# Patient Record
Sex: FEMALE | Race: WHITE
[De-identification: names, ages, dates, MRNs, and addresses within clinical notes are randomized per-mention and may not be internally consistent; named-entity substitution may affect disease eponyms.]

---

## 2017-03-17 ENCOUNTER — HOSPITAL ENCOUNTER (EMERGENCY)
Dept: HOSPITAL 61 - ER | Age: 57
Discharge: HOME | End: 2017-03-17
Payer: SELF-PAY

## 2017-03-17 VITALS — BODY MASS INDEX: 25.46 KG/M2 | WEIGHT: 168 LBS | HEIGHT: 68 IN

## 2017-03-17 VITALS — DIASTOLIC BLOOD PRESSURE: 87 MMHG | SYSTOLIC BLOOD PRESSURE: 132 MMHG

## 2017-03-17 DIAGNOSIS — F17.200: ICD-10-CM

## 2017-03-17 DIAGNOSIS — I10: ICD-10-CM

## 2017-03-17 DIAGNOSIS — Z87.442: ICD-10-CM

## 2017-03-17 DIAGNOSIS — J44.9: ICD-10-CM

## 2017-03-17 DIAGNOSIS — J18.9: ICD-10-CM

## 2017-03-17 DIAGNOSIS — G89.29: ICD-10-CM

## 2017-03-17 DIAGNOSIS — N39.0: Primary | ICD-10-CM

## 2017-03-17 LAB
ALBUMIN SERPL-MCNC: 3.8 G/DL (ref 3.4–5)
ALBUMIN/GLOB SERPL: 0.8 {RATIO} (ref 1–1.7)
ALP SERPL-CCNC: 83 U/L (ref 46–116)
ALT SERPL-CCNC: 23 U/L (ref 14–59)
ANION GAP SERPL CALC-SCNC: 10 MMOL/L (ref 6–14)
AST SERPL-CCNC: 32 U/L (ref 15–37)
BACTERIA #/AREA URNS HPF: (no result) /HPF
BASOPHILS # BLD AUTO: 0 X10^3/UL (ref 0–0.2)
BASOPHILS NFR BLD: 0 % (ref 0–3)
BILIRUB SERPL-MCNC: 0.3 MG/DL (ref 0.2–1)
BILIRUB UR QL STRIP: NEGATIVE
BUN SERPL-MCNC: 16 MG/DL (ref 7–20)
BUN/CREAT SERPL: 13 (ref 6–20)
CALCIUM SERPL-MCNC: 9.3 MG/DL (ref 8.5–10.1)
CHLORIDE SERPL-SCNC: 92 MMOL/L (ref 98–107)
CO2 SERPL-SCNC: 31 MMOL/L (ref 21–32)
CREAT SERPL-MCNC: 1.2 MG/DL (ref 0.6–1)
EOSINOPHIL NFR BLD: 0 % (ref 0–3)
ERYTHROCYTE [DISTWIDTH] IN BLOOD BY AUTOMATED COUNT: 13.1 % (ref 11.5–14.5)
GFR SERPLBLD BASED ON 1.73 SQ M-ARVRAT: 46.1 ML/MIN
GLOBULIN SER-MCNC: 4.9 G/DL (ref 2.2–3.8)
GLUCOSE SERPL-MCNC: 111 MG/DL (ref 70–99)
GLUCOSE UR STRIP-MCNC: NEGATIVE MG/DL
HCT VFR BLD CALC: 40 % (ref 36–47)
HGB BLD-MCNC: 13.7 G/DL (ref 12–15.5)
LYMPHOCYTES # BLD: 1.5 X10^3/UL (ref 1–4.8)
LYMPHOCYTES NFR BLD AUTO: 12 % (ref 24–48)
MCH RBC QN AUTO: 32 PG (ref 25–35)
MCHC RBC AUTO-ENTMCNC: 34 G/DL (ref 31–37)
MCV RBC AUTO: 93 FL (ref 79–100)
MONOCYTES NFR BLD: 5 % (ref 0–9)
NEUTROPHILS NFR BLD AUTO: 82 % (ref 31–73)
NITRITE UR QL STRIP: POSITIVE
PH UR STRIP: 6 [PH]
PLATELET # BLD AUTO: 271 X10^3/UL (ref 140–400)
POTASSIUM SERPL-SCNC: 3.4 MMOL/L (ref 3.5–5.1)
PROT SERPL-MCNC: 8.7 G/DL (ref 6.4–8.2)
PROT UR STRIP-MCNC: 30 MG/DL
RBC # BLD AUTO: 4.32 X10^6/UL (ref 3.5–5.4)
RBC #/AREA URNS HPF: (no result) /HPF (ref 0–2)
SODIUM SERPL-SCNC: 133 MMOL/L (ref 136–145)
SP GR UR STRIP: 1.02
SQUAMOUS #/AREA URNS LPF: (no result) /LPF
UROBILINOGEN UR-MCNC: 1 MG/DL
WBC # BLD AUTO: 12.5 X10^3/UL (ref 4–11)

## 2017-03-17 PROCEDURE — 74176 CT ABD & PELVIS W/O CONTRAST: CPT

## 2017-03-17 PROCEDURE — 87186 SC STD MICRODIL/AGAR DIL: CPT

## 2017-03-17 PROCEDURE — 96365 THER/PROPH/DIAG IV INF INIT: CPT

## 2017-03-17 PROCEDURE — 96366 THER/PROPH/DIAG IV INF ADDON: CPT

## 2017-03-17 PROCEDURE — 96375 TX/PRO/DX INJ NEW DRUG ADDON: CPT

## 2017-03-17 PROCEDURE — 85027 COMPLETE CBC AUTOMATED: CPT

## 2017-03-17 PROCEDURE — 71010: CPT

## 2017-03-17 PROCEDURE — 99285 EMERGENCY DEPT VISIT HI MDM: CPT

## 2017-03-17 PROCEDURE — 81001 URINALYSIS AUTO W/SCOPE: CPT

## 2017-03-17 PROCEDURE — 87086 URINE CULTURE/COLONY COUNT: CPT

## 2017-03-17 PROCEDURE — 80053 COMPREHEN METABOLIC PANEL: CPT

## 2017-03-17 PROCEDURE — 94640 AIRWAY INHALATION TREATMENT: CPT

## 2017-03-17 PROCEDURE — 36415 COLL VENOUS BLD VENIPUNCTURE: CPT

## 2017-03-17 RX ADMIN — SODIUM CHLORIDE, PRESERVATIVE FREE PRN ML: 5 INJECTION INTRAVENOUS at 23:30

## 2017-03-17 RX ADMIN — SODIUM CHLORIDE, PRESERVATIVE FREE PRN ML: 5 INJECTION INTRAVENOUS at 22:53

## 2017-03-17 NOTE — PHYS DOC
Past Medical History


Past Medical History:  Cancer, COPD, Hypertension, Kidney Stone


Additional Past Medical Histor:  chronic pain


Past Surgical History:  Other


Additional Past Surgical Histo:  Ureteral Stent, Laparoscopy


Alcohol Use:  None


Drug Use:  None





Adult General


Chief Complaint


Chief Complaint:  FLANK PAIN





[facility]


HPI


Patient is a 56  year old female presents emergency Department with 2 complaints

: 1. Left-sided flank pain that began 4 days ago with dysuria that began 4 days 

ago as well. Patient has a history of kidney stones. She denies injury to her 

back. She denies any fevers or chills. She denies any nausea or vomiting. She 

denies hematuria or changes in frequency of urination.


2. Shortness of breath and nonproductive cough is been ongoing for 

approximately a week and a half to 2 weeks. Patient has a history of chronic 

bronchitis. Patient continues to smoke. She states that she recently was able 

to get a another nebulizer at home to help with breathing treatments. She 

states that they have had some positive effects, but continues to have the 

problems with a cough.


Overall, patient denies antibiotic use, hospitalization or foreign travel 

within the past 90 days.


Patient reports that her primary care doctor's Dr. Cardona. Patient reports her 

urologist is Dr. Pearson. Patient states that she currently does not have a 

pulmonologist.





Review of Systems


Review of Systems





Constitutional: Denies fever or chills []


Eyes: Denies change in visual acuity, redness, or eye pain []


HENT: Denies nasal congestion or sore throat []


Respiratory: Denies cough or shortness of breath []


Cardiovascular: No additional information not addressed in HPI []


GI: Denies abdominal pain, nausea, vomiting, bloody stools or diarrhea []


: Denies dysuria or hematuria []


Musculoskeletal: Denies back pain or joint pain []


Integument: Denies rash or skin lesions []


Neurologic: Denies headache, focal weakness or sensory changes []


Endocrine: Denies polyuria or polydipsia []





Current Medications


Current Medications





 Current Medications








 Medications


  (Trade)  Dose


 Ordered  Sig/Kimberli  Start Time


 Stop Time Status Last Admin


Dose Admin


 


 Albuterol/


 Ipratropium


  (Duoneb)  3 ml  1X  ONCE  3/17/17 21:00


 3/17/17 21:01 DC 3/17/17 21:04


3 ML


 


 Azithromycin


  (Zithromax)  500 mg  1X  ONCE  3/17/17 22:00


 3/17/17 22:01 DC 3/17/17 22:13


500 MG


 


 Ceftriaxone Sodium


  (Rocephin 1gm


 Ivpb For Omni)  50 ml @ 


 100 mls/hr  1X  ONCE  3/17/17 21:00


 3/17/17 21:29 DC 3/17/17 20:59


100 MLS/HR


 


 Hydromorphone HCl


  (Dilaudid)  1 mg  1X  ONCE  3/17/17 22:00


 3/17/17 22:01 DC 3/17/17 22:13


1 MG


 


 Morphine Sulfate  5 mg  1X  ONCE  3/17/17 21:00


 3/17/17 21:01 DC 3/17/17 21:00


5 MG


 


 Ondansetron HCl 4


 mg  4 mg  1X  ONCE  3/17/17 21:00


 3/17/17 21:01 DC 3/17/17 21:00


4 MG


 


 Sodium Chloride


  (Iv Sodium


 Chloride 0.9%


 1000ml Bag)  1,000 ml @ 


 1,000 mls/hr  Q1H  3/17/17 21:00


 3/17/17 21:59 DC 3/17/17 20:59


1,000 MLS/HR


 


 Sodium Chloride


  (Normal Saline


 Flush)  10 ml  QSHIFT  PRN  3/17/17 20:45


    3/17/17 22:53


10 ML











Allergies


Allergies





 Allergies








Coded Allergies Type Severity Reaction Last Updated Verified


 


  No Known Drug Allergies    10/8/16 No











Physical Exam


Physical Exam





Constitutional: Well developed, well nourished, mild distress, non-toxic 

appearance. []


HENT: Normocephalic, atraumatic, bilateral external ears normal, oropharynx 

moist, no oral exudates, nose normal. 


Eyes: PERRLA, EOMI, conjunctiva normal, no discharge. [] 


Neck: Normal range of motion, no tenderness, supple, no stridor. 


Cardiovascular:Heart rate 88 with regular rhythm, no murmur 


Lungs & Thorax: There is no respiratory distress respiratory fatigue. There is 

no sensory muscle use or posturing. Patient does demonstrate a harsh, bronchitic

, nonproductive cough. Patient is able speak in full sentences. Patient has 

bilateral coarse wheezing in all lung fields. There are no rales or rhonchi.


Abdomen: Bowel sounds normal, soft, no tenderness, no masses, no pulsatile 

masses. 


Skin: Warm, dry, no erythema, no rash. [] 


Back: Patient's back is normal in appearance. There is no overlying skin 

lesions. Patient has left CVA tenderness. There is no palpable defect to the 

musculature of patient's back. There are no active spasms.


Extremities: No tenderness, no cyanosis, no clubbing, ROM intact, no edema. [] 


Neurologic: Alert and oriented X 3, normal motor function, normal sensory 

function, no focal deficits noted. 


Psychologic: Dysphoric mood with pleasant affect. Patient is very focused on 

pain management/pain control.





Current Patient Data


Vital Signs





 Vital Signs








  Date Time  Temp Pulse Resp B/P Pulse Ox O2 Delivery O2 Flow Rate FiO2


 


3/17/17 22:13      Room Air  


 


3/17/17 21:05     88   


 


3/17/17 17:44 98.1 115 18 132/87    





 98.1       








Lab Values





 Laboratory Tests








Test


  3/17/17


18:00 3/17/17


20:35


 


Urine Collection Type Unknown   


 


Urine Color Yellow   


 


Urine Clarity Clear   


 


Urine pH 6.0   


 


Urine Specific Gravity 1.020   


 


Urine Protein


  30mg/dL


(NEG-TRACE) 


 


 


Urine Glucose (UA)


  Negativemg/dL


(NEG) 


 


 


Urine Ketones (Stick)


  Negativemg/dL


(NEG) 


 


 


Urine Blood Small (NEG)   


 


Urine Nitrite


  Positive (NEG)


  


 


 


Urine Bilirubin


  Negative (NEG)


  


 


 


Urine Urobilinogen Dipstick


  1.0mg/dL (0.2


mg/dL) 


 


 


Urine Leukocyte Esterase


  Moderate (NEG)


  


 


 


Urine RBC Occ/HPF (0-2)   


 


Urine WBC


  11-20/HPF


(0-4) 


 


 


Urine Squamous Epithelial


Cells Few/LPF  


  


 


 


Urine Bacteria


  Many/HPF


(0-FEW) 


 


 


Urine Hyaline Casts Moderate/HPF   


 


Urine Mucus Mod/LPF   


 


White Blood Count


  


  12.5x10^3/uL


(4.0-11.0)  H


 


Red Blood Count


  


  4.32x10^6/uL


(3.50-5.40)


 


Hemoglobin


  


  13.7g/dL


(12.0-15.5)


 


Hematocrit


  


  40.0%


(36.0-47.0)


 


Mean Corpuscular Volume  93fL ()  


 


Mean Corpuscular Hemoglobin  32pg (25-35)  


 


Mean Corpuscular Hemoglobin


Concent 


  34g/dL (31-37)


 


 


Red Cell Distribution Width


  


  13.1%


(11.5-14.5)


 


Platelet Count


  


  271x10^3/uL


(140-400)


 


Neutrophils (%) (Auto)  82% (31-73)  H


 


Lymphocytes (%) (Auto)  12% (24-48)  L


 


Monocytes (%) (Auto)  5% (0-9)  


 


Eosinophils (%) (Auto)  0% (0-3)  


 


Basophils (%) (Auto)  0% (0-3)  


 


Neutrophils # (Auto)


  


  10.2x10^3uL


(1.8-7.7)  H


 


Lymphocytes # (Auto)


  


  1.5x10^3/uL


(1.0-4.8)


 


Monocytes # (Auto)


  


  0.7x10^3/uL


(0.0-1.1)


 


Eosinophils # (Auto)


  


  0.0x10^3/uL


(0.0-0.7)


 


Basophils # (Auto)


  


  0.0x10^3/uL


(0.0-0.2)


 


Sodium Level


  


  133mmol/L


(136-145)  L


 


Potassium Level


  


  3.4mmol/L


(3.5-5.1)  L


 


Chloride Level


  


  92mmol/L


()  L


 


Carbon Dioxide Level


  


  31mmol/L


(21-32)


 


Anion Gap  10 (6-14)  


 


Blood Urea Nitrogen


  


  16mg/dL (7-20)


 


 


Creatinine


  


  1.2mg/dL


(0.6-1.0)  H


 


Estimated GFR


(Cockcroft-Gault) 


  46.1  


 


 


BUN/Creatinine Ratio  13 (6-20)  


 


Glucose Level


  


  111mg/dL


(70-99)  H


 


Calcium Level


  


  9.3mg/dL


(8.5-10.1)


 


Total Bilirubin


  


  0.3mg/dL


(0.2-1.0)


 


Aspartate Amino Transferase


(AST) 


  32U/L (15-37)  


 


 


Alanine Aminotransferase (ALT)  23U/L (14-59)  


 


Alkaline Phosphatase


  


  83U/L ()


 


 


Total Protein


  


  8.7g/dL


(6.4-8.2)  H


 


Albumin


  


  3.8g/dL


(3.4-5.0)


 


Albumin/Globulin Ratio


  


  0.8 (1.0-1.7)


L





 Laboratory Tests


3/17/17 20:35








 Laboratory Tests


3/17/17 20:35














EKG


EKG


[]





Radiology/Procedures


Radiology/Procedures


[General acute hospital


 [address] [place] [address] [phone]


 


 IMAGING REPORT





 Signed





PATIENT: PAMELA DU ACCOUNT: [id number] MRN#: [id number]


: 1960 LOCATION: ER AGE: 56


SEX: F EXAM DT: 17 ACCESSION#: [id number]


STATUS: REG ER ORD. PHYSICIAN: AZRA LUBIN 


REASON: LEFT flank pain-hx of kidney stones


PROCEDURE: ABDOMEN PELVIS WO CONTRAST











PROCEDURE 


CT abdomen and pelvis without intravenous contrast. 


 


HISTORY 


Left flank pain, renal stones. 


 


TECHNIQUE 


Helical CT of the abdomen and pelvis was performed without intravenous or 


oral contrast. 


Exposure: One or more of the following individualized dose reduction 


techniques were utilized for this examination: 


1. Automated exposure control. 


2. Adjustment of the mA and/or kV according to patient size. 


3. Use of iterative reconstruction technique. 


 


COMPARISON 


CT abdomen pelvis 2016. 


 


FINDINGS 


Evaluation of solid organs is limited by lack of intravenous contrast. 


Evaluation of enteric structures may be limited by lack of oral contrast. 


Images of lower chest demonstrate nodular ground-glass opacity and 


consolidation involving both lower lobes, suggesting pneumonia versus 


nonspecific inflammatory process. 


The liver is unremarkable. Calcified splenic granulomata are present. 


Pancreas, gallbladder, and bilateral adrenal glands are unremarkable. 


Aortic atherosclerosis is seen. No bowel obstruction or inflammation is 


identified. Appendix is without inflammation. Urinary bladder is 


unremarkable. Uterus is absent. 


Left kidney and ureter are free of stone or obstruction. Superior pole of 


the right kidney demonstrates 3 millimeter nonobstructive nephrolith. 


There is mild right hydroureteronephrosis until the right ureter crosses 


over the iliac vessels; cause is not identified. No right ureteral stone 


is seen. 


Dextroconvex scoliosis of the lumbar spine is seen. Degenerative changes 


are noted in the spine. The L1 vertebral body demonstrates compression 


treated with cement. 


 


IMPRESSION 


1. Nonobstructive right nephrolith.


2. There is mild right hydroureteronephrosis to the level of the right 


ureter as it crosses the iliac vessels. Cause is not identified.


3. Left kidney and ureter are free of stone or obstruction.


4. Nodular ground-glass opacity and consolidation involving both lower 


lobes, compatible with nonspecific infectious or inflammatory process.


 


 


 


Electronically signed by: Dimas Laguna MD (Mar 17, 2017 22:29:40)














DICTATED and SIGNED BY:     DIMAS LAGUNA MD


DATE:     17





CC: AZRA LUBIN; ANDRES CARDONA MD ~








Portable AP chest x-ray patient's chest was performed with adequate technique. 

There appears to be a small infiltrate in patient's right lower lobe.]





Course & Med Decision Making


Course & Med Decision Making


Patient reevaluated by me at 2250: Patient reports that she feels "much better"

. Reevaluation of the patient shows that her oxygen saturation is 91-93% on 

room air. The wheezing has been greatly diminished and her lung fields. She 

reports feeling comfortable. I reviewed her chest x-ray, laboratory tests and 

CT scan findings with her. I will place her on Levaquin. I've advised her to 

use her nebulizer every 6 hours. I advised her to call her primary care doctor, 

Dr. Cardona, Monday morning to schedule follow-up appointment. She will be 

provided discharge instructions reviewing home care and reasons to return to 

the emergency department.





Dragon Disclaimer


Dragon Disclaimer


This electronic medical record was generated, in whole or in part, using a 

voice recognition dictation system.





Departure


Departure


Impression:  


 Primary Impression:  


 Flank pain


 Additional Impressions:  


 UTI (urinary tract infection)


 COPD (chronic obstructive pulmonary disease)


 Community acquired pneumonia


Disposition:   HOME, SELF-CARE


Condition:  IMPROVED


Referrals:  


ANDRES CARDONA MD (PCP)


Patient Instructions:  Chronic Obstructive Pulmonary Disease Exacerbation, Easy-

to-Read, Flank Pain, Easy-to-Read, Pneumonia, Adult, Easy-to-Read, Smoking, You 

Can Quit, Easy-to-Read, Urinary Tract Infection, Easy-to-Read





Additional Instructions:


1. Take the medications as prescribed.


2. Review the discharge instructions provided and reasons to return to the 

emergency department.


3. Use your nebulizer at home every 6 hours.


4. Drink 10-12, 10 ounce glasses of non-caffeinated beverage daily.


5. Call Dr. Cardona's office Monday morning to schedule follow-up appointment.


Scripts


Oxycodone/Apap 5-325 (Percocet 5-325 Mg Tablet)1 Each Tablet1-2 Tab PO Q6HRS #

20 TAB


   Prov:AZRA LUBIN         3/17/17


Prednisone 50 Mg Tablet1 Tab PO DAILY #5 TAB


   Prov:AZRA LUBIN         3/17/17


Levofloxacin (Levaquin)750 Mg Tablet1 Tab PO DAILY #7 TAB


   Prov:AZRA LUBIN         3/17/17





Problem Qualifiers








AZRA LUBIN Mar 17, 2017 20:45

## 2017-03-17 NOTE — RAD
PROCEDURE 

CT abdomen and pelvis without intravenous contrast. 

 

HISTORY 

Left flank pain, renal stones. 

 

TECHNIQUE 

Helical CT of the abdomen and pelvis was performed without intravenous or 

oral contrast. 

Exposure: One or more of the following individualized dose reduction 

techniques were utilized for this examination: 

1. Automated exposure control. 

2. Adjustment of the mA and/or kV according to patient size. 

3. Use of iterative reconstruction technique. 

 

COMPARISON 

CT abdomen pelvis October 8, 2016. 

 

FINDINGS 

Evaluation of solid organs is limited by lack of intravenous contrast. 

Evaluation of enteric structures may be limited by lack of oral contrast. 

Images of lower chest demonstrate nodular ground-glass opacity and 

consolidation involving both lower lobes, suggesting pneumonia versus 

nonspecific inflammatory process. 

The liver is unremarkable. Calcified splenic granulomata are present. 

Pancreas, gallbladder, and bilateral adrenal glands are unremarkable. 

Aortic atherosclerosis is seen. No bowel obstruction or inflammation is 

identified. Appendix is without inflammation. Urinary bladder is 

unremarkable. Uterus is absent. 

Left kidney and ureter are free of stone or obstruction. Superior pole of 

the right kidney demonstrates 3 millimeter nonobstructive nephrolith. 

There is mild right hydroureteronephrosis until the right ureter crosses 

over the iliac vessels; cause is not identified. No right ureteral stone 

is seen. 

Dextroconvex scoliosis of the lumbar spine is seen. Degenerative changes 

are noted in the spine. The L1 vertebral body demonstrates compression 

treated with cement. 

 

IMPRESSION 

1. Nonobstructive right nephrolith.

2. There is mild right hydroureteronephrosis to the level of the right 

ureter as it crosses the iliac vessels. Cause is not identified.

3. Left kidney and ureter are free of stone or obstruction.

4. Nodular ground-glass opacity and consolidation involving both lower 

lobes, compatible with nonspecific infectious or inflammatory process.

 

 

 

Electronically signed by: Dimas Campos MD (Mar 17, 2017 22:29:40)

## 2017-03-18 NOTE — RAD
Indication: Dyspnea, short of breath and chest pain.



Technique: Upright portable chest radiograph was obtained and compared to a

study from June 7, 2015.



Findings: Right lower and left midlung infiltrates are noted. The heart is not

enlarged and the pulmonary vasculature does not appear cephalized. Bony

structures are intact. Leads overlie the patient.



Impression: Right lower and left midlung field infiltrates.

## 2017-09-16 ENCOUNTER — HOSPITAL ENCOUNTER (INPATIENT)
Dept: HOSPITAL 61 - ER | Age: 57
LOS: 2 days | Discharge: HOME | DRG: 552 | End: 2017-09-18
Attending: FAMILY MEDICINE | Admitting: FAMILY MEDICINE
Payer: SELF-PAY

## 2017-09-16 VITALS — DIASTOLIC BLOOD PRESSURE: 87 MMHG | SYSTOLIC BLOOD PRESSURE: 145 MMHG

## 2017-09-16 VITALS — HEIGHT: 68 IN | WEIGHT: 160 LBS | BODY MASS INDEX: 24.25 KG/M2

## 2017-09-16 DIAGNOSIS — I10: ICD-10-CM

## 2017-09-16 DIAGNOSIS — F17.210: ICD-10-CM

## 2017-09-16 DIAGNOSIS — Z82.49: ICD-10-CM

## 2017-09-16 DIAGNOSIS — M54.89: Primary | ICD-10-CM

## 2017-09-16 DIAGNOSIS — G43.909: ICD-10-CM

## 2017-09-16 DIAGNOSIS — M79.1: ICD-10-CM

## 2017-09-16 DIAGNOSIS — J44.9: ICD-10-CM

## 2017-09-16 DIAGNOSIS — E03.9: ICD-10-CM

## 2017-09-16 DIAGNOSIS — E78.5: ICD-10-CM

## 2017-09-16 DIAGNOSIS — G89.29: ICD-10-CM

## 2017-09-16 DIAGNOSIS — E87.6: ICD-10-CM

## 2017-09-16 DIAGNOSIS — C53.9: ICD-10-CM

## 2017-09-16 DIAGNOSIS — Z87.442: ICD-10-CM

## 2017-09-16 DIAGNOSIS — G62.9: ICD-10-CM

## 2017-09-16 DIAGNOSIS — Z92.3: ICD-10-CM

## 2017-09-16 DIAGNOSIS — Z92.21: ICD-10-CM

## 2017-09-16 LAB
ALBUMIN SERPL-MCNC: 4.3 G/DL (ref 3.4–5)
ALBUMIN/GLOB SERPL: 1 {RATIO} (ref 1–1.7)
ALP SERPL-CCNC: 97 U/L (ref 46–116)
ALT SERPL-CCNC: 24 U/L (ref 14–59)
ANION GAP SERPL CALC-SCNC: 11 MMOL/L (ref 6–14)
AST SERPL-CCNC: 22 U/L (ref 15–37)
BACTERIA #/AREA URNS HPF: 0 /HPF
BASOPHILS # BLD AUTO: 0.1 X10^3/UL (ref 0–0.2)
BASOPHILS NFR BLD: 1 % (ref 0–3)
BILIRUB SERPL-MCNC: 0.3 MG/DL (ref 0.2–1)
BILIRUB UR QL STRIP: NEGATIVE
BUN SERPL-MCNC: 5 MG/DL (ref 7–20)
BUN/CREAT SERPL: 5 (ref 6–20)
CALCIUM SERPL-MCNC: 9.9 MG/DL (ref 8.5–10.1)
CHLORIDE SERPL-SCNC: 101 MMOL/L (ref 98–107)
CO2 SERPL-SCNC: 30 MMOL/L (ref 21–32)
CREAT SERPL-MCNC: 1 MG/DL (ref 0.6–1)
EOSINOPHIL NFR BLD: 0 % (ref 0–3)
ERYTHROCYTE [DISTWIDTH] IN BLOOD BY AUTOMATED COUNT: 14.2 % (ref 11.5–14.5)
GFR SERPLBLD BASED ON 1.73 SQ M-ARVRAT: 56.7 ML/MIN
GLOBULIN SER-MCNC: 4.5 G/DL (ref 2.2–3.8)
GLUCOSE SERPL-MCNC: 103 MG/DL (ref 70–99)
GLUCOSE UR STRIP-MCNC: NEGATIVE MG/DL
HCT VFR BLD CALC: 40.3 % (ref 36–47)
HGB BLD-MCNC: 13.9 G/DL (ref 12–15.5)
LYMPHOCYTES # BLD: 2.5 X10^3/UL (ref 1–4.8)
LYMPHOCYTES NFR BLD AUTO: 17 % (ref 24–48)
MAGNESIUM SERPL-MCNC: 2 MG/DL (ref 1.8–2.4)
MCH RBC QN AUTO: 33 PG (ref 25–35)
MCHC RBC AUTO-ENTMCNC: 35 G/DL (ref 31–37)
MCV RBC AUTO: 97 FL (ref 79–100)
MONOCYTES NFR BLD: 7 % (ref 0–9)
NEUTROPHILS NFR BLD AUTO: 76 % (ref 31–73)
NITRITE UR QL STRIP: NEGATIVE
PH UR STRIP: 6.5 [PH]
PLATELET # BLD AUTO: 411 X10^3/UL (ref 140–400)
POTASSIUM SERPL-SCNC: 2.7 MMOL/L (ref 3.5–5.1)
PROT SERPL-MCNC: 8.8 G/DL (ref 6.4–8.2)
PROT UR STRIP-MCNC: NEGATIVE MG/DL
RBC # BLD AUTO: 4.18 X10^6/UL (ref 3.5–5.4)
RBC #/AREA URNS HPF: 0 /HPF (ref 0–2)
SODIUM SERPL-SCNC: 142 MMOL/L (ref 136–145)
SP GR UR STRIP: <=1.005
SQUAMOUS #/AREA URNS LPF: (no result) /LPF
UROBILINOGEN UR-MCNC: 0.2 MG/DL
WBC # BLD AUTO: 15.1 X10^3/UL (ref 4–11)
WBC #/AREA URNS HPF: (no result) /HPF (ref 0–4)

## 2017-09-16 PROCEDURE — 83735 ASSAY OF MAGNESIUM: CPT

## 2017-09-16 PROCEDURE — 84443 ASSAY THYROID STIM HORMONE: CPT

## 2017-09-16 PROCEDURE — 36415 COLL VENOUS BLD VENIPUNCTURE: CPT

## 2017-09-16 PROCEDURE — 83690 ASSAY OF LIPASE: CPT

## 2017-09-16 PROCEDURE — 90732 PPSV23 VACC 2 YRS+ SUBQ/IM: CPT

## 2017-09-16 PROCEDURE — 80053 COMPREHEN METABOLIC PANEL: CPT

## 2017-09-16 PROCEDURE — 80048 BASIC METABOLIC PNL TOTAL CA: CPT

## 2017-09-16 PROCEDURE — 81001 URINALYSIS AUTO W/SCOPE: CPT

## 2017-09-16 PROCEDURE — 85025 COMPLETE CBC W/AUTO DIFF WBC: CPT

## 2017-09-16 PROCEDURE — 84439 ASSAY OF FREE THYROXINE: CPT

## 2017-09-16 PROCEDURE — 87086 URINE CULTURE/COLONY COUNT: CPT

## 2017-09-16 PROCEDURE — 90686 IIV4 VACC NO PRSV 0.5 ML IM: CPT

## 2017-09-16 PROCEDURE — 96361 HYDRATE IV INFUSION ADD-ON: CPT

## 2017-09-16 PROCEDURE — 96374 THER/PROPH/DIAG INJ IV PUSH: CPT

## 2017-09-16 RX ADMIN — HYDROMORPHONE HYDROCHLORIDE PRN MG: 2 INJECTION INTRAMUSCULAR; INTRAVENOUS; SUBCUTANEOUS at 22:22

## 2017-09-16 RX ADMIN — HYDROMORPHONE HYDROCHLORIDE PRN MG: 2 INJECTION INTRAMUSCULAR; INTRAVENOUS; SUBCUTANEOUS at 21:36

## 2017-09-16 RX ADMIN — HYDROMORPHONE HYDROCHLORIDE PRN MG: 2 INJECTION INTRAMUSCULAR; INTRAVENOUS; SUBCUTANEOUS at 23:16

## 2017-09-16 NOTE — PHYS DOC
Past Medical History


Past Medical History:  Cancer, COPD, Hypertension, Kidney Stone


Additional Past Medical Histor:  chronic pain


Past Surgical History:  Other


Additional Past Surgical Histo:  Ureteral Stent, Laparoscopy


Alcohol Use:  None


Drug Use:  None





Adult General


Chief Complaint


Chief Complaint:  FLANK PAIN





HPI


HPI





Patient is a 57  year old female who presents with complaint of left-sided back 

and flank pain. Patient states that her symptoms started this morning at 

approximately 9:00. Patient states that she has been having near constant pain 

which has been fluctuating throughout the day. Patient states that she has felt 

spasms along the left side of her back. The patient states that she is 

concerned that they may be kidney stones. Patient has had multiple visits to 

the emergency department for similar complaints. The patient has history of 

chronic back pain and currently takes oxycodone therapy at home. Patient states 

that she took oxycodone today with no relief in symptoms. The patient has not 

had any associated fever, nausea, or vomiting. Patient rates pain currently as 

7 out of 10. Patient states that she has had associated loose stool with her 

symptoms today. The patient states that she spoke with Dr. Addison who was on-

call for her primary physician, Dr. Michele, and she was instructed to come to the 

emergency department for further evaluation.





Review of Systems


Review of Systems





Constitutional: Denies fever or chills []


Eyes: Denies change in visual acuity, redness, or eye pain []


HENT: Denies nasal congestion or sore throat []


Respiratory: Denies cough or shortness of breath []


Cardiovascular: Denies chest pain or edema[]


GI: Denies abdominal pain, nausea, vomiting, bloody stools or diarrhea []


: Denies dysuria or hematuria []


Musculoskeletal: Back pain[]


Integument: Denies rash or skin lesions []


Neurologic: Denies headache, focal weakness or sensory changes []





Current Medications


Current Medications





Current Medications








 Medications


  (Trade)  Dose


 Ordered  Sig/Kimberli  Start Time


 Stop Time Status Last Admin


Dose Admin


 


 Hydromorphone HCl


  (Dilaudid)  1 mg  PRN Q15MIN  PRN  9/16/17 21:30


 9/17/17 21:29  9/16/17 22:22


1 MG


 


 Ondansetron HCl


  (Zofran)  4 mg  1X  ONCE  9/16/17 21:30


 9/16/17 21:31 DC 9/16/17 21:35


4 MG


 


 Potassium Chloride


  (Klor-Con)  40 meq  1X  ONCE  9/16/17 22:30


 9/16/17 22:31 DC  


 


 


 Sodium Chloride  1,000 ml @ 


 1,000 mls/hr  Q1H  9/16/17 21:30


 9/16/17 22:29 DC 9/16/17 21:35


1,000 MLS/HR











Allergies


Allergies





Allergies








Coded Allergies Type Severity Reaction Last Updated Verified


 


  No Known Drug Allergies    10/8/16 No











Physical Exam


Physical Exam





Constitutional: Alert, afebrile, appears in moderate discomfort. []


HENT: Normocephalic, atraumatic, bilateral external ears normal, oropharynx 

moist, no oral exudates, nose normal. []


Eyes: PERRLA, EOMI, conjunctiva normal, no discharge. [] 


Neck: Normal range of motion, no tenderness, supple, no stridor. [] 


Cardiovascular:Heart rate regular rhythm, no murmur []


Lungs & Thorax:  Bilateral breath sounds clear to auscultation []


Abdomen: Bowel sounds normal, soft, no tenderness, no masses, no pulsatile 

masses. [] 


Skin: Warm, dry, no erythema, no rash. [] 


Back: No midline tenderness, left mid and lower lumbar paraspinous muscle 

tenderness to palpation with palpable spasm, no CVA tenderness. [] 


Extremities: No tenderness, no cyanosis, no clubbing, ROM intact, no edema. [] 


Neurologic: Alert and oriented X 3, normal motor function, normal sensory 

function, no focal deficits noted. []





Current Patient Data


Vital Signs





 Vital Signs








  Date Time  Temp Pulse Resp B/P (MAP) Pulse Ox O2 Delivery O2 Flow Rate FiO2


 


9/16/17 22:22   18  98 Room Air  


 


9/16/17 20:54 97.9 105  162/98 (119)    





 97.9       








Lab Values





 Laboratory Tests








Test


  9/16/17


20:47 9/16/17


20:58


 


Urine Collection Type Unknown   


 


Urine Color Yellow   


 


Urine Clarity Clear   


 


Urine pH 6.5   


 


Urine Specific Gravity <=1.005   


 


Urine Protein


  Negative mg/dL


(NEG-TRACE) 


 


 


Urine Glucose (UA)


  Negative mg/dL


(NEG) 


 


 


Urine Ketones (Stick)


  Negative mg/dL


(NEG) 


 


 


Urine Blood


  Negative (NEG)


  


 


 


Urine Nitrite


  Negative (NEG)


  


 


 


Urine Bilirubin


  Negative (NEG)


  


 


 


Urine Urobilinogen Dipstick


  0.2 mg/dL (0.2


mg/dL) 


 


 


Urine Leukocyte Esterase Small (NEG)   


 


Urine RBC 0 /HPF (0-2)   


 


Urine WBC


  5-10 /HPF


(0-4) 


 


 


Urine Squamous Epithelial


Cells Mod /LPF  


  


 


 


Urine Bacteria


  0 /HPF (0-FEW)


  


 


 


White Blood Count


  


  15.1 x10^3/uL


(4.0-11.0)  H


 


Red Blood Count


  


  4.18 x10^6/uL


(3.50-5.40)


 


Hemoglobin


  


  13.9 g/dL


(12.0-15.5)


 


Hematocrit


  


  40.3 %


(36.0-47.0)


 


Mean Corpuscular Volume


  


  97 fL ()


 


 


Mean Corpuscular Hemoglobin  33 pg (25-35)  


 


Mean Corpuscular Hemoglobin


Concent 


  35 g/dL


(31-37)


 


Red Cell Distribution Width


  


  14.2 %


(11.5-14.5)


 


Platelet Count


  


  411 x10^3/uL


(140-400)  H


 


Neutrophils (%) (Auto)  76 % (31-73)  H


 


Lymphocytes (%) (Auto)  17 % (24-48)  L


 


Monocytes (%) (Auto)  7 % (0-9)  


 


Eosinophils (%) (Auto)  0 % (0-3)  


 


Basophils (%) (Auto)  1 % (0-3)  


 


Neutrophils # (Auto)


  


  11.4 x10^3uL


(1.8-7.7)  H


 


Lymphocytes # (Auto)


  


  2.5 x10^3/uL


(1.0-4.8)


 


Monocytes # (Auto)


  


  1.1 x10^3/uL


(0.0-1.1)


 


Eosinophils # (Auto)


  


  0.1 x10^3/uL


(0.0-0.7)


 


Basophils # (Auto)


  


  0.1 x10^3/uL


(0.0-0.2)


 


Sodium Level


  


  142 mmol/L


(136-145)


 


Potassium Level


  


  2.7 mmol/L


(3.5-5.1)  *L


 


Chloride Level


  


  101 mmol/L


()


 


Carbon Dioxide Level


  


  30 mmol/L


(21-32)


 


Anion Gap  11 (6-14)  


 


Blood Urea Nitrogen


  


  5 mg/dL (7-20)


L


 


Creatinine


  


  1.0 mg/dL


(0.6-1.0)


 


Estimated GFR


(Cockcroft-Gault) 


  56.7  


 


 


BUN/Creatinine Ratio  5 (6-20)  L


 


Glucose Level


  


  103 mg/dL


(70-99)  H


 


Calcium Level


  


  9.9 mg/dL


(8.5-10.1)


 


Magnesium Level


  


  2.0 mg/dL


(1.8-2.4)


 


Total Bilirubin


  


  0.3 mg/dL


(0.2-1.0)


 


Aspartate Amino Transferase


(AST) 


  22 U/L (15-37)


 


 


Alanine Aminotransferase (ALT)


  


  24 U/L (14-59)


 


 


Alkaline Phosphatase


  


  97 U/L


()


 


Total Protein


  


  8.8 g/dL


(6.4-8.2)  H


 


Albumin


  


  4.3 g/dL


(3.4-5.0)


 


Albumin/Globulin Ratio  1.0 (1.0-1.7)  


 


Lipase


  


  101 U/L


()





 Laboratory Tests


9/16/17 20:58








 Laboratory Tests


9/16/17 20:58











EKG


EKG


Not performed[]





Radiology/Procedures


Radiology/Procedures


Not performed[]





Course & Med Decision Making


Course & Med Decision Making


Pertinent Labs and Imaging studies reviewed. (See chart for details)





The patient's lab work was remarkable for a decreased potassium level at 2.7. 

The patient was started on oral supplementation to replace this as this may be 

contributing to the patient's back spasms. The patient's urine shows no 

evidence of hematuria. The patient's previous CT scan was reviewed from March 17 , 2017. It reported evidence of nephrolithiasis without obstructing calculi in 

the right kidney and no evidence of nephrolithiasis or ureterolithiasis on the 

left side. I have very low suspicion that the patient's symptoms are due to 

ureteral lithiasis. Due to multiple previous abdominal CTs, the patient will 

not be CT did to reduce radiation exposure. The patient was treated with IV 

Dilaudid in the emergency department. Despite treatment, the patient states 

that she is still in severe pain and is unable to function at home in her 

current state. I spoke with Dr. Addison who was on-call for Dr. Michele and she 

accepted care of patient in hospital.





Dragon Disclaimer


Dragon Disclaimer


This electronic medical record was generated, in whole or in part, using a 

voice recognition dictation system.





Departure


Departure


Impression:  


 Primary Impression:  


 Intractable pain


 Additional Impression:  


 Hypokalemia


Disposition:  09 ADMITTED AS INPATIENT


Admitting Physician:  Judie Addison


Condition:  STABLE


Referrals:  


ANDRES MICHELE MD (PCP)





Problem Qualifiers











PINA LIRA MD Sep 16, 2017 21:29

## 2017-09-17 VITALS — DIASTOLIC BLOOD PRESSURE: 84 MMHG | SYSTOLIC BLOOD PRESSURE: 149 MMHG

## 2017-09-17 VITALS — DIASTOLIC BLOOD PRESSURE: 64 MMHG | SYSTOLIC BLOOD PRESSURE: 95 MMHG

## 2017-09-17 VITALS — SYSTOLIC BLOOD PRESSURE: 114 MMHG | DIASTOLIC BLOOD PRESSURE: 76 MMHG

## 2017-09-17 VITALS — DIASTOLIC BLOOD PRESSURE: 89 MMHG | SYSTOLIC BLOOD PRESSURE: 136 MMHG

## 2017-09-17 VITALS — DIASTOLIC BLOOD PRESSURE: 76 MMHG | SYSTOLIC BLOOD PRESSURE: 112 MMHG

## 2017-09-17 VITALS — DIASTOLIC BLOOD PRESSURE: 92 MMHG | SYSTOLIC BLOOD PRESSURE: 135 MMHG

## 2017-09-17 LAB
ANION GAP SERPL CALC-SCNC: 6 MMOL/L (ref 6–14)
BASOPHILS # BLD AUTO: 0 X10^3/UL (ref 0–0.2)
BASOPHILS NFR BLD: 1 % (ref 0–3)
BUN SERPL-MCNC: 6 MG/DL (ref 7–20)
CALCIUM SERPL-MCNC: 8.4 MG/DL (ref 8.5–10.1)
CHLORIDE SERPL-SCNC: 107 MMOL/L (ref 98–107)
CO2 SERPL-SCNC: 30 MMOL/L (ref 21–32)
CREAT SERPL-MCNC: 1 MG/DL (ref 0.6–1)
EOSINOPHIL NFR BLD: 1 % (ref 0–3)
ERYTHROCYTE [DISTWIDTH] IN BLOOD BY AUTOMATED COUNT: 13.9 % (ref 11.5–14.5)
GFR SERPLBLD BASED ON 1.73 SQ M-ARVRAT: 56.7 ML/MIN
GLUCOSE SERPL-MCNC: 111 MG/DL (ref 70–99)
HCT VFR BLD CALC: 35.1 % (ref 36–47)
HGB BLD-MCNC: 12.5 G/DL (ref 12–15.5)
LYMPHOCYTES # BLD: 3.1 X10^3/UL (ref 1–4.8)
LYMPHOCYTES NFR BLD AUTO: 30 % (ref 24–48)
MCH RBC QN AUTO: 34 PG (ref 25–35)
MCHC RBC AUTO-ENTMCNC: 36 G/DL (ref 31–37)
MCV RBC AUTO: 95 FL (ref 79–100)
MONOCYTES NFR BLD: 8 % (ref 0–9)
NEUTROPHILS NFR BLD AUTO: 60 % (ref 31–73)
PLATELET # BLD AUTO: 344 X10^3/UL (ref 140–400)
POTASSIUM SERPL-SCNC: 3.3 MMOL/L (ref 3.5–5.1)
RBC # BLD AUTO: 3.7 X10^6/UL (ref 3.5–5.4)
SODIUM SERPL-SCNC: 143 MMOL/L (ref 136–145)
T4 FREE SERPL-MCNC: 0.58 NG/DL (ref 0.76–1.46)
WBC # BLD AUTO: 10.1 X10^3/UL (ref 4–11)

## 2017-09-17 RX ADMIN — IBUPROFEN SCH MG: 600 TABLET ORAL at 14:51

## 2017-09-17 RX ADMIN — MORPHINE SULFATE PRN MG: 4 INJECTION, SOLUTION INTRAMUSCULAR; INTRAVENOUS at 09:20

## 2017-09-17 RX ADMIN — MORPHINE SULFATE PRN MG: 4 INJECTION, SOLUTION INTRAMUSCULAR; INTRAVENOUS at 00:26

## 2017-09-17 RX ADMIN — BACITRACIN SCH MLS/HR: 5000 INJECTION, POWDER, FOR SOLUTION INTRAMUSCULAR at 06:21

## 2017-09-17 RX ADMIN — MORPHINE SULFATE PRN MG: 4 INJECTION, SOLUTION INTRAMUSCULAR; INTRAVENOUS at 03:43

## 2017-09-17 RX ADMIN — MORPHINE SULFATE PRN MG: 4 INJECTION, SOLUTION INTRAMUSCULAR; INTRAVENOUS at 06:20

## 2017-09-17 RX ADMIN — MORPHINE SULFATE PRN MG: 4 INJECTION, SOLUTION INTRAMUSCULAR; INTRAVENOUS at 11:22

## 2017-09-17 RX ADMIN — BACITRACIN SCH MLS/HR: 5000 INJECTION, POWDER, FOR SOLUTION INTRAMUSCULAR at 00:23

## 2017-09-17 RX ADMIN — LEVOTHYROXINE SODIUM SCH MCG: 150 TABLET ORAL at 16:34

## 2017-09-17 RX ADMIN — MORPHINE SULFATE PRN MG: 4 INJECTION, SOLUTION INTRAMUSCULAR; INTRAVENOUS at 22:09

## 2017-09-17 RX ADMIN — POTASSIUM CHLORIDE SCH MEQ: 1500 TABLET, EXTENDED RELEASE ORAL at 12:35

## 2017-09-17 RX ADMIN — CYCLOBENZAPRINE HYDROCHLORIDE SCH MG: 10 TABLET, FILM COATED ORAL at 14:51

## 2017-09-17 RX ADMIN — MORPHINE SULFATE PRN MG: 4 INJECTION, SOLUTION INTRAMUSCULAR; INTRAVENOUS at 14:51

## 2017-09-17 RX ADMIN — CYCLOBENZAPRINE HYDROCHLORIDE SCH MG: 10 TABLET, FILM COATED ORAL at 19:51

## 2017-09-17 RX ADMIN — MORPHINE SULFATE PRN MG: 4 INJECTION, SOLUTION INTRAMUSCULAR; INTRAVENOUS at 17:25

## 2017-09-17 RX ADMIN — LOSARTAN POTASSIUM SCH MG: 50 TABLET ORAL at 12:35

## 2017-09-17 RX ADMIN — POTASSIUM CHLORIDE SCH MEQ: 1500 TABLET, EXTENDED RELEASE ORAL at 19:51

## 2017-09-17 RX ADMIN — NICOTINE SCH PATCH: 21 PATCH, EXTENDED RELEASE TOPICAL at 12:34

## 2017-09-17 RX ADMIN — IBUPROFEN SCH MG: 600 TABLET ORAL at 19:51

## 2017-09-17 NOTE — PDOC
PROGRESS NOTES


Subjective


Subjective


Patient reports left back pain persists, IV Morphine does help.





Objective


Objective





Vital Signs








  Date Time  Temp Pulse Resp B/P (MAP) Pulse Ox O2 Delivery O2 Flow Rate FiO2


 


9/17/17 11:54     96 Room Air  


 


9/17/17 11:00 97.8 73 16 136/89 (105)    





 97.8       











Physical Exam


Abdomen:  Normal bowel sounds, Soft, No tenderness


Heart:  Regular rate


Extremities:  No edema


General:  Alert, Oriented X3, No acute distress


Lungs:  Clear to auscultation


MUSCULOSKELETAL:  Other (Back with marked TTP over left lumbar paraspinal 

muscles. No CVA TTP bilaterally)





Assessment


Assessment


Problems


Medical Problems:


(1) Hypokalemia


Status: Acute  





(2) Intractable pain


Status: Acute  











Plan


Plan of Care


1. Acute exacerbation of chronic back pain - exam consistent with 

musculoskeletal pain, not renal stone. UA without any RBC's, also making renal 

stone unlikely. Will not order CT at this time, can reconsider if patient fails 

to improve with maximization of back pain treatment. Home Oxycodone ordered, 

will also add scheduled Flexeril and Ibuprofen. Continue IV med if needed. 

Urine with 5-10 WBC's, patient without dysuria. Culture pending.


2. HTN - continue home meds.


3. hypothyroidism - continue replacement and check thyroid labs.


4. hypokalemia - improving, continue po replacement and recheck in AM.


Addendum: Lab shows TSH markedly elevated at over 27.  Upon further questioning 

patient admits she has not taken her Levothyroxine for awhile as it is 

expensive. Importance of this medication discussed. Resume her previous dose in 

AM.





Comment


Review of Relevant


I have reviewed the following items raffi (where applicable) has been applied.


Labs





Laboratory Tests








Test


  9/16/17


20:47 9/16/17


20:58 9/17/17


04:10


 


Urine Collection Type Unknown   


 


Urine Color Yellow   


 


Urine Clarity Clear   


 


Urine pH 6.5   


 


Urine Specific Gravity <=1.005   


 


Urine Protein


  Negative mg/dL


(NEG-TRACE) 


  


 


 


Urine Glucose (UA)


  Negative mg/dL


(NEG) 


  


 


 


Urine Ketones (Stick)


  Negative mg/dL


(NEG) 


  


 


 


Urine Blood Negative (NEG)   


 


Urine Nitrite Negative (NEG)   


 


Urine Bilirubin Negative (NEG)   


 


Urine Urobilinogen Dipstick


  0.2 mg/dL (0.2


mg/dL) 


  


 


 


Urine Leukocyte Esterase Small (NEG)   


 


Urine RBC 0 /HPF (0-2)   


 


Urine WBC


  5-10 /HPF


(0-4) 


  


 


 


Urine Squamous Epithelial


Cells Mod /LPF 


  


  


 


 


Urine Bacteria 0 /HPF (0-FEW)   


 


White Blood Count


  


  15.1 x10^3/uL


(4.0-11.0) 10.1 x10^3/uL


(4.0-11.0)


 


Red Blood Count


  


  4.18 x10^6/uL


(3.50-5.40) 3.70 x10^6/uL


(3.50-5.40)


 


Hemoglobin


  


  13.9 g/dL


(12.0-15.5) 12.5 g/dL


(12.0-15.5)


 


Hematocrit


  


  40.3 %


(36.0-47.0) 35.1 %


(36.0-47.0)


 


Mean Corpuscular Volume  97 fL ()  95 fL () 


 


Mean Corpuscular Hemoglobin  33 pg (25-35)  34 pg (25-35) 


 


Mean Corpuscular Hemoglobin


Concent 


  35 g/dL


(31-37) 36 g/dL


(31-37)


 


Red Cell Distribution Width


  


  14.2 %


(11.5-14.5) 13.9 %


(11.5-14.5)


 


Platelet Count


  


  411 x10^3/uL


(140-400) 344 x10^3/uL


(140-400)


 


Neutrophils (%) (Auto)  76 % (31-73)  60 % (31-73) 


 


Lymphocytes (%) (Auto)  17 % (24-48)  30 % (24-48) 


 


Monocytes (%) (Auto)  7 % (0-9)  8 % (0-9) 


 


Eosinophils (%) (Auto)  0 % (0-3)  1 % (0-3) 


 


Basophils (%) (Auto)  1 % (0-3)  1 % (0-3) 


 


Neutrophils # (Auto)


  


  11.4 x10^3uL


(1.8-7.7) 6.1 x10^3uL


(1.8-7.7)


 


Lymphocytes # (Auto)


  


  2.5 x10^3/uL


(1.0-4.8) 3.1 x10^3/uL


(1.0-4.8)


 


Monocytes # (Auto)


  


  1.1 x10^3/uL


(0.0-1.1) 0.8 x10^3/uL


(0.0-1.1)


 


Eosinophils # (Auto)


  


  0.1 x10^3/uL


(0.0-0.7) 0.1 x10^3/uL


(0.0-0.7)


 


Basophils # (Auto)


  


  0.1 x10^3/uL


(0.0-0.2) 0.0 x10^3/uL


(0.0-0.2)


 


Sodium Level


  


  142 mmol/L


(136-145) 143 mmol/L


(136-145)


 


Potassium Level


  


  2.7 mmol/L


(3.5-5.1) 3.3 mmol/L


(3.5-5.1)


 


Chloride Level


  


  101 mmol/L


() 107 mmol/L


()


 


Carbon Dioxide Level


  


  30 mmol/L


(21-32) 30 mmol/L


(21-32)


 


Anion Gap  11 (6-14)  6 (6-14) 


 


Blood Urea Nitrogen  5 mg/dL (7-20)  6 mg/dL (7-20) 


 


Creatinine


  


  1.0 mg/dL


(0.6-1.0) 1.0 mg/dL


(0.6-1.0)


 


Estimated GFR


(Cockcroft-Gault) 


  56.7 


  56.7 


 


 


BUN/Creatinine Ratio  5 (6-20)  


 


Glucose Level


  


  103 mg/dL


(70-99) 111 mg/dL


(70-99)


 


Calcium Level


  


  9.9 mg/dL


(8.5-10.1) 8.4 mg/dL


(8.5-10.1)


 


Magnesium Level


  


  2.0 mg/dL


(1.8-2.4) 


 


 


Total Bilirubin


  


  0.3 mg/dL


(0.2-1.0) 


 


 


Aspartate Amino Transf


(AST/SGOT) 


  22 U/L (15-37) 


  


 


 


Alanine Aminotransferase


(ALT/SGPT) 


  24 U/L (14-59) 


  


 


 


Alkaline Phosphatase


  


  97 U/L


() 


 


 


Total Protein


  


  8.8 g/dL


(6.4-8.2) 


 


 


Albumin


  


  4.3 g/dL


(3.4-5.0) 


 


 


Albumin/Globulin Ratio  1.0 (1.0-1.7)  


 


Lipase


  


  101 U/L


() 


 








Laboratory Tests








Test


  9/16/17


20:47 9/16/17


20:58 9/17/17


04:10


 


Urine Collection Type Unknown   


 


Urine Color Yellow   


 


Urine Clarity Clear   


 


Urine pH 6.5   


 


Urine Specific Gravity <=1.005   


 


Urine Protein


  Negative mg/dL


(NEG-TRACE) 


  


 


 


Urine Glucose (UA)


  Negative mg/dL


(NEG) 


  


 


 


Urine Ketones (Stick)


  Negative mg/dL


(NEG) 


  


 


 


Urine Blood Negative (NEG)   


 


Urine Nitrite Negative (NEG)   


 


Urine Bilirubin Negative (NEG)   


 


Urine Urobilinogen Dipstick


  0.2 mg/dL (0.2


mg/dL) 


  


 


 


Urine Leukocyte Esterase Small (NEG)   


 


Urine RBC 0 /HPF (0-2)   


 


Urine WBC


  5-10 /HPF


(0-4) 


  


 


 


Urine Squamous Epithelial


Cells Mod /LPF 


  


  


 


 


Urine Bacteria 0 /HPF (0-FEW)   


 


White Blood Count


  


  15.1 x10^3/uL


(4.0-11.0) 10.1 x10^3/uL


(4.0-11.0)


 


Red Blood Count


  


  4.18 x10^6/uL


(3.50-5.40) 3.70 x10^6/uL


(3.50-5.40)


 


Hemoglobin


  


  13.9 g/dL


(12.0-15.5) 12.5 g/dL


(12.0-15.5)


 


Hematocrit


  


  40.3 %


(36.0-47.0) 35.1 %


(36.0-47.0)


 


Mean Corpuscular Volume  97 fL ()  95 fL () 


 


Mean Corpuscular Hemoglobin  33 pg (25-35)  34 pg (25-35) 


 


Mean Corpuscular Hemoglobin


Concent 


  35 g/dL


(31-37) 36 g/dL


(31-37)


 


Red Cell Distribution Width


  


  14.2 %


(11.5-14.5) 13.9 %


(11.5-14.5)


 


Platelet Count


  


  411 x10^3/uL


(140-400) 344 x10^3/uL


(140-400)


 


Neutrophils (%) (Auto)  76 % (31-73)  60 % (31-73) 


 


Lymphocytes (%) (Auto)  17 % (24-48)  30 % (24-48) 


 


Monocytes (%) (Auto)  7 % (0-9)  8 % (0-9) 


 


Eosinophils (%) (Auto)  0 % (0-3)  1 % (0-3) 


 


Basophils (%) (Auto)  1 % (0-3)  1 % (0-3) 


 


Neutrophils # (Auto)


  


  11.4 x10^3uL


(1.8-7.7) 6.1 x10^3uL


(1.8-7.7)


 


Lymphocytes # (Auto)


  


  2.5 x10^3/uL


(1.0-4.8) 3.1 x10^3/uL


(1.0-4.8)


 


Monocytes # (Auto)


  


  1.1 x10^3/uL


(0.0-1.1) 0.8 x10^3/uL


(0.0-1.1)


 


Eosinophils # (Auto)


  


  0.1 x10^3/uL


(0.0-0.7) 0.1 x10^3/uL


(0.0-0.7)


 


Basophils # (Auto)


  


  0.1 x10^3/uL


(0.0-0.2) 0.0 x10^3/uL


(0.0-0.2)


 


Sodium Level


  


  142 mmol/L


(136-145) 143 mmol/L


(136-145)


 


Potassium Level


  


  2.7 mmol/L


(3.5-5.1) 3.3 mmol/L


(3.5-5.1)


 


Chloride Level


  


  101 mmol/L


() 107 mmol/L


()


 


Carbon Dioxide Level


  


  30 mmol/L


(21-32) 30 mmol/L


(21-32)


 


Anion Gap  11 (6-14)  6 (6-14) 


 


Blood Urea Nitrogen  5 mg/dL (7-20)  6 mg/dL (7-20) 


 


Creatinine


  


  1.0 mg/dL


(0.6-1.0) 1.0 mg/dL


(0.6-1.0)


 


Estimated GFR


(Cockcroft-Gault) 


  56.7 


  56.7 


 


 


BUN/Creatinine Ratio  5 (6-20)  


 


Glucose Level


  


  103 mg/dL


(70-99) 111 mg/dL


(70-99)


 


Calcium Level


  


  9.9 mg/dL


(8.5-10.1) 8.4 mg/dL


(8.5-10.1)


 


Magnesium Level


  


  2.0 mg/dL


(1.8-2.4) 


 


 


Total Bilirubin


  


  0.3 mg/dL


(0.2-1.0) 


 


 


Aspartate Amino Transf


(AST/SGOT) 


  22 U/L (15-37) 


  


 


 


Alanine Aminotransferase


(ALT/SGPT) 


  24 U/L (14-59) 


  


 


 


Alkaline Phosphatase


  


  97 U/L


() 


 


 


Total Protein


  


  8.8 g/dL


(6.4-8.2) 


 


 


Albumin


  


  4.3 g/dL


(3.4-5.0) 


 


 


Albumin/Globulin Ratio  1.0 (1.0-1.7)  


 


Lipase


  


  101 U/L


() 


 








Medications





Current Medications


Hydromorphone HCl (Dilaudid) 1 mg PRN Q15MIN  PRN IV/SQ PAIN GREATER THAN 3/10 

Last administered on 9/16/17at 23:16;  Start 9/16/17 at 21:30;  Stop 9/17/17 at 

21:29


Sodium Chloride 1,000 ml @  1,000 mls/hr Q1H IV  Last administered on 9/16/17at 

21:35;  Start 9/16/17 at 21:30;  Stop 9/16/17 at 22:29;  Status DC


Ondansetron HCl (Zofran) 4 mg 1X  ONCE IV  Last administered on 9/16/17at 21:35

;  Start 9/16/17 at 21:30;  Stop 9/16/17 at 21:31;  Status DC


Potassium Chloride (Klor-Con) 40 meq 1X  ONCE PO  Last administered on 9/16/ 17at 23:17;  Start 9/16/17 at 22:30;  Stop 9/16/17 at 22:31;  Status DC


Ondansetron HCl (Zofran) 4 mg PRN Q8HRS  PRN IV NAUSEA/VOMITING;  Start 9/16/17 

at 23:00;  Stop 9/17/17 at 22:59


Sodium Chloride 1,000 ml @  125 mls/hr Q8H IV  Last administered on 9/17/17at 06

:21;  Start 9/16/17 at 23:00;  Stop 9/17/17 at 22:59


Potassium Chloride (Klor-Con) 40 meq 1X  ONCE PO  Last administered on 9/17/ 17at 00:25;  Start 9/16/17 at 23:30;  Stop 9/16/17 at 23:31;  Status DC


Morphine Sulfate 4 mg PRN Q2HR  PRN IV SEVERE PAIN Last administered on 9/17/ 17at 11:22;  Start 9/16/17 at 23:30


Info (Do NOT chart on this placeholder) 1 each 1X  ONCE MC ;  Start 9/17/17 at 

05:30;  Stop 9/17/17 at 05:31;  Status UNV


Pneumococcal Polyvalent Vaccine (Do NOT chart on this placeholder) 1 each 1X  

ONCE MC ;  Start 9/17/17 at 05:30;  Stop 9/17/17 at 05:31;  Status UNV


Influenza Virus Vaccine Quadrival (Fluarix Quad [id number] Syringe) 0.5 ml ONCE 

ONCE VAX IM  Last administered on 9/17/17at 11:24;  Start 9/17/17 at 09:00;  

Stop 9/17/17 at 09:01;  Status DC


Pneumococcal Polyvalent Vaccine (Pneumovax 23) 0.5 ml ONCE ONCE VAX IM  Last 

administered on 9/17/17at 11:26;  Start 9/17/17 at 09:00;  Stop 9/17/17 at 09:01

;  Status DC


Levothyroxine Sodium (Synthroid) 150 mcg DAILY07 PO ;  Start 9/18/17 at 12:00


Oxycodone HCl (Roxicodone) 30 mg PRN Q4HRS  PRN PO PAIN;  Start 9/17/17 at 12:00


Amlodipine Besylate (Norvasc) 10 mg DAILY PO ;  Start 9/17/17 at 13:00


Cyclobenzaprine HCl (Flexeril) 10 mg TID PO ;  Start 9/17/17 at 14:00


Hydrochlorothiazide (Hydrodiuril) 25 mg DAILY PO ;  Start 9/17/17 at 13:00


Sumatriptan Succinate (Imitrex) 100 mg DAILY  PRN PO MIGRAINE HEADACHE;  Start 9 /17/17 at 12:15


Losartan Potassium (Cozaar) 100 mg DAILY PO ;  Start 9/17/17 at 12:15;  Status 

UNV


Nicotine (Nicoderm Cq 21mg) 1 patch DAILY TD ;  Start 9/17/17 at 12:15;  Status 

UNV


Ibuprofen (Motrin) 600 mg PRN Q6HRS  PRN PO INFLAMMATION;  Start 9/17/17 at 12:

15;  Status UNV





Active Scripts


Active


Cyclobenzaprine Hcl 10 Mg Tablet 1 Tab PO TID


Imitrex (Sumatriptan Succinate) 100 Mg Tablet 1 Tab PO UD


Losartan Potassium 100 Mg Tablet 100 Mg PO DAILY 30 Days


Hydrochlorothiazide Tablet  ** (Hydrochlorothiazide) 25 Mg Tablet 1 Tab PO DAILY


Amlodipine Besylate 10 Mg Tablet 10 Mg PO DAILY 30 Days


Reported


Oxycodone Hcl 5 Mg Tablet 30 Mg PO PRN Q4HRS PRN


Levothyroxine Sodium 150 Mcg Tablet 150 Mcg PO


Vitals/I & O





Vital Sign - Last 24 Hours








 9/16/17 9/16/17 9/16/17 9/16/17





 20:54 21:30 21:36 22:00


 


Temp 97.9   





 97.9   


 


Pulse 105 102  88


 


Resp 18 18 18 17


 


B/P (MAP) 162/98 (119) 130/87 (101)  155/75 (101)


 


Pulse Ox 98 97 99 97


 


O2 Delivery Room Air Room Air Room Air 


 


    





    





 9/16/17 9/16/17 9/16/17 9/16/17





 22:22 22:30 23:00 23:16


 


Pulse  86 88 


 


Resp 18 22 21 


 


B/P (MAP)  146/104 (118) 135/85 (102) 


 


Pulse Ox 98 97 97 98


 


O2 Delivery Room Air  Room Air Room Air





 9/16/17 9/17/17 9/17/17 9/17/17





 23:27 00:30 03:20 03:43


 


Temp 97.5  97.9 





 97.5  97.9 


 


Pulse 79  66 


 


Resp 18 18 


 


B/P (MAP) 145/87 (106)  149/84 (105) 


 


Pulse Ox 96   


 


O2 Delivery Room Air Room Air Room Air Room Air


 


    





    





 9/17/17 9/17/17 9/17/17 9/17/17





 07:00 08:00 09:20 11:00


 


Temp 97.9   97.8





 97.9   97.8


 


Pulse 67   73


 


Resp 16   16


 


B/P (MAP) 135/92 (106)   136/89 (105)


 


Pulse Ox   96 97


 


O2 Delivery Room Air Room Air Room Air Room Air


 


    





    





 9/17/17 9/17/17  





 11:22 11:54  


 


Pulse Ox 96 96  


 


O2 Delivery Room Air Room Air  

















RONNIE BELL MD Sep 17, 2017 12:13

## 2017-09-17 NOTE — HP
ADMIT DATE:  09/17/2017



CHIEF COMPLAINT:  Back pain.



HISTORY OF PRESENT ILLNESS:  The patient is a 57-year-old female with a history

of chronic back pain who usually takes oxycodone 4 times a day for her pain. 

She presented to the Emergency Room reporting a sudden increase in her chronic

pain.  She had been taking her usual pain medication but her symptoms had become

much worse and she was unable to get comfortable at home.  The pain was located

in the left low back and somewhat to the flank.  She has a history of renal

stones, so was concerned that she could have the recurrence of a stone causing

the pain.  Initial evaluation in the Emergency Room showed the patient to have

tenderness to palpation over the lumbar paraspinal muscles.  A urine specimen

was without any red blood cells.  It was felt that her pain was more consistent

with musculoskeletal pain.  She was started on medication and admitted for

further treatment.



PAST MEDICAL HISTORY:  Chronic back pain, hypertension, hyperlipidemia, migraine

headaches, previous renal stones, cervical cancer.



PAST SURGICAL HISTORY:  Treatment of renal stones several times.



HOME MEDICATIONS:  Amlodipine 10 mg daily, hydrochlorothiazide 25 mg daily,

levothyroxine 150 mcg daily, Imitrex 100 mg p.r.n., oxycodone 30 mg tablets 2

tablets four times daily, Flexeril 10 mg three times daily p.r.n., losartan 100

mg daily.



ALLERGIES:  The patient has no known drug allergies.



FAMILY HISTORY:  Noncontributory.



SOCIAL HISTORY:  The patient is .  She continues to smoke cigarettes

about one pack per day.



REVIEW OF SYSTEMS:  The patient denies fever or chills.  She denies cough or

shortness of breath.  She denies chest pain or palpitations.  She denies

abdominal pain, nausea or vomiting.  She denies dysuria, hematuria or increased

urinary frequency.  She reports that her  had a recent injury and so she

is having to provide more of his care physically at home.  She feels this may be

contributing to her increased symptoms of back pain.



PHYSICAL EXAMINATION:

GENERAL:  The patient is alert and oriented x3, resting comfortably in bed, in

no acute distress.

HEENT:  PERRL, EOMI, sclerae clear.  Oropharynx:  Mucous membranes moist.

NECK:  Supple, without lymphadenopathy.

CHEST:  Clear to auscultation with normal respiratory effort.

CARDIOVASCULAR:  Regular rhythm without murmur.

ABDOMEN:  Soft, nontender, normoactive bowel sounds are present.

EXTREMITIES:  Bilateral lower extremities are without edema.

BACK:  There is marked tenderness to palpation over the left lumbar paraspinal

muscles.  There is no CVA tenderness to palpation bilaterally.



ASSESSMENT AND PLAN:

1.  Acute exacerbation of chronic back pain.  The patient's exam is consistent

with musculoskeletal pain, not renal stone.  Her urinalysis was without any red

blood cells also making renal stone unlikely to be causing her symptoms.  We

will not order a CT at this time.  This can certainly be reconsidered if the

patient fails to improve with maximization of back pain treatment.  Her home

oxycodone has been ordered.  We will also add scheduled Flexeril and scheduled

ibuprofen.  She may continue to take the intravenous pain medicine as needed, if

her pain is not improved with the oral medications as above.  The patient's

urine had 5 to 10 wbc's but also moderate squamous epithelial cells.  Culture is

pending.  No antibiotics for this are presently indicated.

2.  Hypertension.  Continue home medications.

3.  Hypothyroidism.  Continue replacement and check thyroid lab as these have

not been done recently in our office.

4.  Hypokalemia.  The patient's potassium was significantly low at 2.7 at

admission.  This was replaced orally and has improved to 3.3 today.  We will

continue oral replacement today and recheck labs tomorrow.

 



______________________________

RONNIE BELL MD DR:  FER/joslyn  JOB#:  [id number] / [id number]

DD:  09/17/2017 12:20  DT:  09/17/2017 12:56

FAVIAN

## 2017-09-18 VITALS — DIASTOLIC BLOOD PRESSURE: 75 MMHG | SYSTOLIC BLOOD PRESSURE: 135 MMHG

## 2017-09-18 VITALS — DIASTOLIC BLOOD PRESSURE: 67 MMHG | SYSTOLIC BLOOD PRESSURE: 110 MMHG

## 2017-09-18 VITALS — SYSTOLIC BLOOD PRESSURE: 135 MMHG | DIASTOLIC BLOOD PRESSURE: 75 MMHG

## 2017-09-18 VITALS — DIASTOLIC BLOOD PRESSURE: 67 MMHG | SYSTOLIC BLOOD PRESSURE: 114 MMHG

## 2017-09-18 VITALS — SYSTOLIC BLOOD PRESSURE: 119 MMHG | DIASTOLIC BLOOD PRESSURE: 84 MMHG

## 2017-09-18 LAB
ANION GAP SERPL CALC-SCNC: 8 MMOL/L (ref 6–14)
BUN SERPL-MCNC: 8 MG/DL (ref 7–20)
CALCIUM SERPL-MCNC: 9.5 MG/DL (ref 8.5–10.1)
CHLORIDE SERPL-SCNC: 103 MMOL/L (ref 98–107)
CO2 SERPL-SCNC: 30 MMOL/L (ref 21–32)
CREAT SERPL-MCNC: 1 MG/DL (ref 0.6–1)
GFR SERPLBLD BASED ON 1.73 SQ M-ARVRAT: 56.7 ML/MIN
GLUCOSE SERPL-MCNC: 97 MG/DL (ref 70–99)
POTASSIUM SERPL-SCNC: 3.6 MMOL/L (ref 3.5–5.1)
SODIUM SERPL-SCNC: 141 MMOL/L (ref 136–145)

## 2017-09-18 RX ADMIN — IBUPROFEN SCH MG: 600 TABLET ORAL at 14:33

## 2017-09-18 RX ADMIN — CYCLOBENZAPRINE HYDROCHLORIDE SCH MG: 10 TABLET, FILM COATED ORAL at 14:32

## 2017-09-18 RX ADMIN — MORPHINE SULFATE PRN MG: 4 INJECTION, SOLUTION INTRAMUSCULAR; INTRAVENOUS at 04:32

## 2017-09-18 RX ADMIN — POTASSIUM CHLORIDE SCH MEQ: 1500 TABLET, EXTENDED RELEASE ORAL at 08:31

## 2017-09-18 RX ADMIN — LEVOTHYROXINE SODIUM SCH MCG: 150 TABLET ORAL at 06:11

## 2017-09-18 RX ADMIN — MORPHINE SULFATE PRN MG: 4 INJECTION, SOLUTION INTRAMUSCULAR; INTRAVENOUS at 14:34

## 2017-09-18 RX ADMIN — LOSARTAN POTASSIUM SCH MG: 50 TABLET ORAL at 08:29

## 2017-09-18 RX ADMIN — IBUPROFEN SCH MG: 600 TABLET ORAL at 08:30

## 2017-09-18 RX ADMIN — MORPHINE SULFATE PRN MG: 4 INJECTION, SOLUTION INTRAMUSCULAR; INTRAVENOUS at 09:55

## 2017-09-18 RX ADMIN — NICOTINE SCH PATCH: 21 PATCH, EXTENDED RELEASE TOPICAL at 08:31

## 2017-09-18 RX ADMIN — CYCLOBENZAPRINE HYDROCHLORIDE SCH MG: 10 TABLET, FILM COATED ORAL at 08:30

## 2017-09-18 NOTE — CONS
DATE OF CONSULTATION:  09/18/2017



LOCATION:  She is in room 660.



ATTENDING PHYSICIAN:  Dr. Cardona.  



The patient was seen at the request of Dr. Cardona for rehab evaluation.



HISTORY OF PRESENT ILLNESS:  This is a 57-year-old female with chronic lower

back pain.  She usually takes oxycodone 4 times a day, admitted to the Emergency

Room with increased lower back pain.  She recently was helping her  who

had some medical problems.  She denies any radiation of pain to the extremities,

but admits some burning sensation in her lower extremities.  The patient denies

any trouble with her bowel or bladder control.  The patient also takes care of

her grandchildren, ages 2 and 8.  The patient had history of renal stones,

status post ureteral stent placement in the past.  The patient also had history

of hypertension, hyperlipidemia, migraine headaches, cervical carcinoma, status

post chemotherapy and radiation therapy.



ALLERGIES:  The patient is not known allergic to any medication.



PHYSICAL EXAMINATION:  Today revealed a middle-aged female.  She is alert, in no

acute distress, oriented to time, place, person, and circumstance and follows

commands appropriately, moves all 4 extremities voluntarily where she had 4+/5

grade muscle strength and deep tendon reflexes are 2+ and symmetrical with

absent ankle jerks and she had equal perception of touch and pinprick sensation

bilaterally.  She had painful, limited movements of her lumbar spine without any

significant paraspinal muscle spasm, tenderness to palpation over left lumbar

paraspinal muscles and also over sacroiliac joint area.  Straight leg raising

test is negative bilaterally.  She is not using proper body mechanics during

mobility, but remains independent with her mobility and most of the aspects of

her self-care.  Her skin is intact at this time.  She can walk on her tiptoes

and on her heels without any increased discomfort.



ASSESSMENT:  A middle-aged female with chronic lower back pain from degenerative

disk disease with recent exacerbation.  No clinical evidence of ongoing lumbar

radiculopathy.  She presents with peripheral neuropathy probably from her

chemotherapy for cervical carcinoma, also with known hypertension,

hyperlipidemia, migraine headaches, renal stones.



RECOMMENDATIONS:  I have reviewed with her a home program of physical

modalities, trigger point massage, and relax stretching exercise to her lower

back muscles and proper body mechanics and avoid any activity that takes her

back like lifting her grandkids.  I have suggested outpatient physical therapy,

but she does not have any health insurance right now.  I would like to see her

for followup on as-needed basis.  Home when medically stable.  



Dr. Cardona, I appreciate asking me to participate in the care of this interesting

patient.  I will be glad to follow her with you as needed for her

rehabilitation.

 



______________________________

KRISTI WOODS MD



DR:  STEPHANIE/joslyn  JOB#:  [id number] / [id number]

DD:  09/18/2017 09:14  DT:  09/18/2017 21:00

## 2017-09-18 NOTE — PDOC3
Discharge Summary [facility]


Date of Admission:  Sep 16, 2017


Discharge Date:  Sep 18, 2017


Admitting Diagnosis


intractable back pain


Problems:  


Final Diagnosis


Problems


Medical Problems:


(2) Hypokalemia


Status: Acute  





(1) Intractable pain - primary diagnosis


Status: Acute  





CONSULTS


Anay


Procedures


none


Brief Hospital Course


Ms. Chand  is a 57 old who presented with intractable left flank pain thinking 

it may be a recurrence of her prior kidney stone pain came to the ER but urine 

was negative for blood and her pain was reproducible with musculoskeletal 

movement and palpation so no imaging studies were ordered.  She required IV 

morphine for pain control initially along with her routine chronic pain 

medication.  She admits to lifting her 300 lb  who recently was involved 

in an accident and is requiring a lot of assistance.  Her pain has improved 

today and seems controlled with oral meds but she is  with 

palpation of her left flank area


Patient History:  


Family history: Cardiovascular disease (situation)


  33 FATHER


  32 MOTHER


Family history: Hypertension (situation)


  G8 BROTHER


  G8 SISTER


Problems:  


CONDITION AT DISCHARGE:  Improved, Stable


Diet


cardiac


Scheduled


Amlodipine Besylate (Amlodipine Besylate), 10 MG PO DAILY


Cyclobenzaprine Hcl (Cyclobenzaprine Hcl), 1 TAB PO TID


Hydrochlorothiazide (Hydrochlorothiazide Tablet  **), 1 TAB PO DAILY


Losartan Potassium (Losartan Potassium), 100 MG PO DAILY


Sumatriptan Succinate (Imitrex), 1 TAB PO UD





Scheduled PRN


Oxycodone Hcl (Oxycodone Hcl), 30 MG PO PRN Q4HRS PRN for PAIN, (Reported)





Miscellaneous Medications


Levothyroxine Sodium (Levothyroxine Sodium), 150 MCG PO, (Reported)





Discontinued Medications


Amlodipine/Valsartan/Hcthiazid (Exforge Hct -25 Mg Tab), 1 TAB PO DAILY, (

Reported)


Ciprofloxacin Hcl (Cipro), 1 TAB PO BID


Ciprofloxacin Hcl (Cipro), 1 TAB PO BID


Hydrocodone/Acetaminophen (Lortab 5-325 mg Tablet), 1 TAB PO PRN Q6HRS PRN for 

PAIN


Hydrocodone/Apap 5-325 (Norco 5-325 Tablet), 1-2 TAB PO Q4-6HRS


Levofloxacin (Levaquin), 1 TAB PO DAILY


Metoprolol Succinate (Metoprolol Succinate ( Xl )), 100 MG PO, (Reported)


Ondansetron (Zofran Odt), 1 TAB SL Q8HRS


Oxycodone/Apap 5-325 (Percocet 5-325 Mg Tablet), 1 TAB PO TID


Oxycodone/Apap 5-325 (Percocet 5-325 Mg Tablet), 1-2 TAB PO Q6HRS


Prednisone (Prednisone), 1 TAB PO DAILY


Tamsulosin Hcl (Flomax), 1 CAP PO DAILY


Follow Up


1-2 weeks with myself in the office, she is due for a refill of chronic pain 

meds soon











ANDRES MICHELE MD Sep 18, 2017 17:51

## 2017-09-22 ENCOUNTER — HOSPITAL ENCOUNTER (EMERGENCY)
Dept: HOSPITAL 61 - ER | Age: 57
Discharge: HOME | End: 2017-09-22
Payer: SELF-PAY

## 2017-09-22 VITALS — HEIGHT: 68 IN | BODY MASS INDEX: 24.25 KG/M2 | WEIGHT: 160 LBS

## 2017-09-22 VITALS — SYSTOLIC BLOOD PRESSURE: 105 MMHG | DIASTOLIC BLOOD PRESSURE: 62 MMHG

## 2017-09-22 DIAGNOSIS — Z87.442: ICD-10-CM

## 2017-09-22 DIAGNOSIS — I10: ICD-10-CM

## 2017-09-22 DIAGNOSIS — M54.5: ICD-10-CM

## 2017-09-22 DIAGNOSIS — G89.29: Primary | ICD-10-CM

## 2017-09-22 DIAGNOSIS — J44.9: ICD-10-CM

## 2017-09-22 LAB
ALBUMIN SERPL-MCNC: 4.1 G/DL (ref 3.4–5)
ALBUMIN/GLOB SERPL: 0.9 {RATIO} (ref 1–1.7)
ALP SERPL-CCNC: 99 U/L (ref 46–116)
ALT SERPL-CCNC: 24 U/L (ref 14–59)
ANION GAP SERPL CALC-SCNC: 8 MMOL/L (ref 6–14)
AST SERPL-CCNC: 23 U/L (ref 15–37)
BACTERIA #/AREA URNS HPF: 0 /HPF
BASOPHILS # BLD AUTO: 0.1 X10^3/UL (ref 0–0.2)
BASOPHILS NFR BLD: 1 % (ref 0–3)
BILIRUB SERPL-MCNC: 0.2 MG/DL (ref 0.2–1)
BILIRUB UR QL STRIP: NEGATIVE
BUN SERPL-MCNC: 8 MG/DL (ref 7–20)
BUN/CREAT SERPL: 8 (ref 6–20)
CALCIUM SERPL-MCNC: 10.1 MG/DL (ref 8.5–10.1)
CHLORIDE SERPL-SCNC: 99 MMOL/L (ref 98–107)
CO2 SERPL-SCNC: 30 MMOL/L (ref 21–32)
CREAT SERPL-MCNC: 1 MG/DL (ref 0.6–1)
EOSINOPHIL NFR BLD: 1 % (ref 0–3)
ERYTHROCYTE [DISTWIDTH] IN BLOOD BY AUTOMATED COUNT: 13.8 % (ref 11.5–14.5)
GFR SERPLBLD BASED ON 1.73 SQ M-ARVRAT: 56.7 ML/MIN
GLOBULIN SER-MCNC: 4.4 G/DL (ref 2.2–3.8)
GLUCOSE SERPL-MCNC: 104 MG/DL (ref 70–99)
GLUCOSE UR STRIP-MCNC: NEGATIVE MG/DL
HCT VFR BLD CALC: 39.4 % (ref 36–47)
HGB BLD-MCNC: 13.5 G/DL (ref 12–15.5)
LYMPHOCYTES # BLD: 2.1 X10^3/UL (ref 1–4.8)
LYMPHOCYTES NFR BLD AUTO: 23 % (ref 24–48)
MCH RBC QN AUTO: 33 PG (ref 25–35)
MCHC RBC AUTO-ENTMCNC: 34 G/DL (ref 31–37)
MCV RBC AUTO: 97 FL (ref 79–100)
MONOCYTES NFR BLD: 8 % (ref 0–9)
NEUTROPHILS NFR BLD AUTO: 67 % (ref 31–73)
NITRITE UR QL STRIP: NEGATIVE
PH UR STRIP: 6 [PH]
PLATELET # BLD AUTO: 340 X10^3/UL (ref 140–400)
POTASSIUM SERPL-SCNC: 3.6 MMOL/L (ref 3.5–5.1)
PROT SERPL-MCNC: 8.5 G/DL (ref 6.4–8.2)
PROT UR STRIP-MCNC: NEGATIVE MG/DL
RBC # BLD AUTO: 4.08 X10^6/UL (ref 3.5–5.4)
RBC #/AREA URNS HPF: 0 /HPF (ref 0–2)
SODIUM SERPL-SCNC: 137 MMOL/L (ref 136–145)
SP GR UR STRIP: <=1.005
SQUAMOUS #/AREA URNS LPF: (no result) /LPF
UROBILINOGEN UR-MCNC: 0.2 MG/DL
WBC # BLD AUTO: 9.3 X10^3/UL (ref 4–11)
WBC #/AREA URNS HPF: (no result) /HPF (ref 0–4)

## 2017-09-22 PROCEDURE — 87086 URINE CULTURE/COLONY COUNT: CPT

## 2017-09-22 PROCEDURE — 83690 ASSAY OF LIPASE: CPT

## 2017-09-22 PROCEDURE — 96376 TX/PRO/DX INJ SAME DRUG ADON: CPT

## 2017-09-22 PROCEDURE — 36415 COLL VENOUS BLD VENIPUNCTURE: CPT

## 2017-09-22 PROCEDURE — 96361 HYDRATE IV INFUSION ADD-ON: CPT

## 2017-09-22 PROCEDURE — 99285 EMERGENCY DEPT VISIT HI MDM: CPT

## 2017-09-22 PROCEDURE — 80053 COMPREHEN METABOLIC PANEL: CPT

## 2017-09-22 PROCEDURE — 74177 CT ABD & PELVIS W/CONTRAST: CPT

## 2017-09-22 PROCEDURE — 85025 COMPLETE CBC W/AUTO DIFF WBC: CPT

## 2017-09-22 PROCEDURE — 81001 URINALYSIS AUTO W/SCOPE: CPT

## 2017-09-22 PROCEDURE — 96375 TX/PRO/DX INJ NEW DRUG ADDON: CPT

## 2017-09-22 PROCEDURE — 96374 THER/PROPH/DIAG INJ IV PUSH: CPT

## 2017-09-22 RX ADMIN — HYDROMORPHONE HYDROCHLORIDE PRN MG: 2 INJECTION INTRAMUSCULAR; INTRAVENOUS; SUBCUTANEOUS at 06:46

## 2017-09-22 RX ADMIN — HYDROMORPHONE HYDROCHLORIDE PRN MG: 2 INJECTION INTRAMUSCULAR; INTRAVENOUS; SUBCUTANEOUS at 07:11

## 2017-09-22 RX ADMIN — HYDROMORPHONE HYDROCHLORIDE PRN MG: 2 INJECTION INTRAMUSCULAR; INTRAVENOUS; SUBCUTANEOUS at 07:55

## 2017-09-22 NOTE — PHYS DOC
Past Medical History


Past Medical History:  Cancer, COPD, Hypertension, Kidney Stone


Additional Past Medical Histor:  chronic pain


Past Surgical History:  Other


Additional Past Surgical Histo:  Ureteral Stent, Laparoscopy


Alcohol Use:  None


Drug Use:  None





Adult General


Chief Complaint


Chief Complaint:  FLANK PAIN





HPI


HPI





Patient is a 57  year old female who presents with complaint of left-sided 

flank pain. The patient was admitted to the hospital for similar symptoms on 

2017. Patient was treated for her pain which improved and was 

discharged on 2017. Patient states starting yesterday morning her 

symptoms came back and have been persistent. The patient states that the pain 

is very similar to her previous visit and is very sharp and worsens with 

movement and with palpation to her left side. Patient has history of chronic 

back pain and is on oxycodone and cyclobenzaprine at home. Patient states that 

she is taking these medications with no significant relief in symptoms. Patient 

states that she is having difficulty with ambulation because of her pain but 

denies any radicular symptoms. Patient rates pain as 10 out of 10.





Review of Systems


Review of Systems





Constitutional: Denies fever or chills []


Eyes: Denies change in visual acuity, redness, or eye pain []


HENT: Denies nasal congestion or sore throat []


Respiratory: Denies cough or shortness of breath []


Cardiovascular: Denies chest pain or edema[]


GI: Denies abdominal pain, nausea, vomiting, bloody stools or diarrhea []


: Denies dysuria or hematuria []


Musculoskeletal: Left sided lower back and flank pain[]


Integument: Denies rash or skin lesions []


Neurologic: Denies headache, focal weakness or sensory changes []





Current Medications


Current Medications





Current Medications








 Medications


  (Trade)  Dose


 Ordered  Sig/Kimberli  Start Time


 Stop Time Status Last Admin


Dose Admin


 


 Hydromorphone HCl


  (Dilaudid)  1 mg  PRN Q15MIN  PRN  17 06:45


 17 06:44  17 07:55


1 MG


 


 Info


  (Do NOT chart on


 this entry -- for


 MONITORING)  1 each  PRN DAILY  PRN  17 07:45


 17 07:44   


 


 


 Iohexol


  (Omnipaque 300


 Mg/ml)  60 ml  1X  ONCE  17 07:30


 17 07:31 DC 17 07:34


60 ML


 


 Ondansetron HCl


  (Zofran)  4 mg  1X  ONCE  17 06:45


 17 06:46 DC 17 06:47


4 MG


 


 Sodium Chloride  1,000 ml @ 


 100 mls/hr  Q10H  17 06:45


 17 16:44  17 06:46


100 MLS/HR











Allergies


Allergies





Allergies








Coded Allergies Type Severity Reaction Last Updated Verified


 


  No Known Drug Allergies    10/8/16 No











Physical Exam


Physical Exam





Constitutional: Alert, afebrile, appears in moderate discomfort. []


HENT: Normocephalic, atraumatic, bilateral external ears normal, oropharynx 

moist, no oral exudates, nose normal. []


Eyes: PERRLA, EOMI, conjunctiva normal, no discharge. [] 


Neck: Normal range of motion, no tenderness, supple, no stridor. [] 


Cardiovascular:Heart rate regular rhythm, no murmur []


Lungs & Thorax:  Bilateral breath sounds clear to auscultation []


Abdomen: Bowel sounds normal, soft, no tenderness, no masses, no pulsatile 

masses. [] 


Skin: Warm, dry, no erythema, no rash. [] 


Back: No midline tenderness, left paraspinous muscle tenderness to palpation, 

no flank ecchymosis. [] 


Extremities: No tenderness, no cyanosis, no clubbing, ROM intact, no edema. [] 


Neurologic: Alert and oriented X 3, normal motor function, normal sensory 

function, no focal deficits noted. []





Current Patient Data


Vital Signs





 Vital Signs








  Date Time  Temp Pulse Resp B/P (MAP) Pulse Ox O2 Delivery O2 Flow Rate FiO2


 


17 08:00  68 18 133/71 (91) 95 Room Air  


 


17 06:26 97.8       





 97.8       








Lab Values





 Laboratory Tests








Test


  17


06:43 17


07:45


 


White Blood Count


  9.3 x10^3/uL


(4.0-11.0) 


 


 


Red Blood Count


  4.08 x10^6/uL


(3.50-5.40) 


 


 


Hemoglobin


  13.5 g/dL


(12.0-15.5) 


 


 


Hematocrit


  39.4 %


(36.0-47.0) 


 


 


Mean Corpuscular Volume


  97 fL ()


  


 


 


Mean Corpuscular Hemoglobin 33 pg (25-35)   


 


Mean Corpuscular Hemoglobin


Concent 34 g/dL


(31-37) 


 


 


Red Cell Distribution Width


  13.8 %


(11.5-14.5) 


 


 


Platelet Count


  340 x10^3/uL


(140-400) 


 


 


Neutrophils (%) (Auto) 67 % (31-73)   


 


Lymphocytes (%) (Auto) 23 % (24-48)  L 


 


Monocytes (%) (Auto) 8 % (0-9)   


 


Eosinophils (%) (Auto) 1 % (0-3)   


 


Basophils (%) (Auto) 1 % (0-3)   


 


Neutrophils # (Auto)


  6.2 x10^3uL


(1.8-7.7) 


 


 


Lymphocytes # (Auto)


  2.1 x10^3/uL


(1.0-4.8) 


 


 


Monocytes # (Auto)


  0.8 x10^3/uL


(0.0-1.1) 


 


 


Eosinophils # (Auto)


  0.1 x10^3/uL


(0.0-0.7) 


 


 


Basophils # (Auto)


  0.1 x10^3/uL


(0.0-0.2) 


 


 


Sodium Level


  137 mmol/L


(136-145) 


 


 


Potassium Level


  3.6 mmol/L


(3.5-5.1) 


 


 


Chloride Level


  99 mmol/L


() 


 


 


Carbon Dioxide Level


  30 mmol/L


(21-32) 


 


 


Anion Gap 8 (6-14)   


 


Blood Urea Nitrogen


  8 mg/dL (7-20)


  


 


 


Creatinine


  1.0 mg/dL


(0.6-1.0) 


 


 


Estimated GFR


(Cockcroft-Gault) 56.7  


  


 


 


BUN/Creatinine Ratio 8 (6-20)   


 


Glucose Level


  104 mg/dL


(70-99)  H 


 


 


Calcium Level


  10.1 mg/dL


(8.5-10.1) 


 


 


Total Bilirubin


  0.2 mg/dL


(0.2-1.0) 


 


 


Aspartate Amino Transferase


(AST) 23 U/L (15-37)


  


 


 


Alanine Aminotransferase (ALT)


  24 U/L (14-59)


  


 


 


Alkaline Phosphatase


  99 U/L


() 


 


 


Total Protein


  8.5 g/dL


(6.4-8.2)  H 


 


 


Albumin


  4.1 g/dL


(3.4-5.0) 


 


 


Albumin/Globulin Ratio


  0.9 (1.0-1.7)


L 


 


 


Lipase


  93 U/L


() 


 


 


Urine Collection Type  Unknown  


 


Urine Color  Yellow  


 


Urine Clarity  Clear  


 


Urine pH  6.0  


 


Urine Specific Gravity  <=1.005  


 


Urine Protein


  


  Negative mg/dL


(NEG-TRACE)


 


Urine Glucose (UA)


  


  Negative mg/dL


(NEG)


 


Urine Ketones (Stick)


  


  Negative mg/dL


(NEG)


 


Urine Blood


  


  Negative (NEG)


 


 


Urine Nitrite


  


  Negative (NEG)


 


 


Urine Bilirubin


  


  Negative (NEG)


 


 


Urine Urobilinogen Dipstick


  


  0.2 mg/dL (0.2


mg/dL)


 


Urine Leukocyte Esterase


  


  Moderate (NEG)


 


 


Urine RBC  0 /HPF (0-2)  


 


Urine WBC


  


  1-4 /HPF (0-4)


 


 


Urine Squamous Epithelial


Cells 


  Few /LPF  


 


 


Urine Bacteria


  


  0 /HPF (0-FEW)


 





 Laboratory Tests


17 06:43








 Laboratory Tests


17 06:43











EKG


EKG


Rhythm strip interpretation by me: Heart rate 78, sinus rhythm, no ectopy[]





Radiology/Procedures


Radiology/Procedures


 [facility]


 [address] [place] [address]


 [phone]


 


 IMAGING REPORT





 Signed





PATIENT: PAMELA DU ACCOUNT: [id number] MRN#: [id number]


: 1960 LOCATION: ER AGE: 57


SEX: F EXAM DT: 17 ACCESSION#: [id number]


STATUS: REG ER ORD. PHYSICIAN: PINA LIRA MD 


REASON: left flank pain


PROCEDURE: CT ABD PELV W/ IV CONTRST ONLY





Indication left-sided flank pain for one week.





Axial images to the abdomen and pelvis were obtained. No oral contrast was


administered. 60 cc of Omnipaque 300 was administered intravenously. Note is


made of a previous examination 6 months ago.





There is a low-density mass in the right lobe of the liver appearing similar


to an examination 3/16/2011 and compatible with an incidental finding. An


acute or significant finding in the liver is not seen. The gallbladder is


grossly normal and the spleen appears unremarkable. There is a subtle


groundglass opacity involving the left lower lobe. The etiology is unclear.


Underlying inflammation accounting for the appearance is not excluded.


Postoperative changes compatible with mesh material are noted in the abdominal


wall. The pancreas appears unremarkable. No adrenal pathology is seen. There


is unchanged 5 mm calculus in the right kidney. Similar to the previous exam


there is mild hydroureter to the level of the pelvis where the ureter passes


the iliac vessels. An acute finding in the abdomen is not seen. Postoperative


changes in the pelvis are noted. No acute finding in the pelvis is seen.


Kyphoplasty changes are noted at L1. Degenerative changes are noted


predominantly at L5-S1.





IMPRESSION: No acute finding seen in the abdomen or pelvis


                          Groundglass opacity left lower lobe. Clinical


correlation advised














DICTATED and SIGNED BY:     SNEHA FORD MD


DATE:     17 0751





CC: PINA LIRA MD; ANDRES CARDONA MD ~


[]





Course & Med Decision Making


Course & Med Decision Making


Pertinent Labs and Imaging studies reviewed. (See chart for details)





Patient was given 3 mg total of IV Dilaudid in the emergency department with 

complete resolution of pain. Patient states that she feels better and would 

like to go home. Spoke with patient regarding subtle groundglass opacity in the 

left lower lobe that was found on the CT scan. The patient does not appear to 

have clinical signs of acute pneumonia as she is afebrile, has no elevated 

white count, and no complaint of acute cough or shortness of breath. 

Recommended that this be followed up by patient's primary physician. Advised 

patient follow-up with Dr. Cardona in the next 5 days for reevaluation and return 

to emergency department for any worsening symptoms. Patient voiced 

understanding and in agreement with treatment plan.





Dragon Disclaimer


Dragon Disclaimer


This electronic medical record was generated, in whole or in part, using a 

voice recognition dictation system.





Departure


Departure


Impression:  


 Primary Impression:  


 Acute exacerbation of chronic low back pain


Disposition:  01 HOME, SELF-CARE


Condition:  IMPROVED


Referrals:  


ANDRES CARDONA MD (PCP)


Patient Instructions:  Chronic Back Pain





Additional Instructions:  


Follow-up with Dr. Cardona in the next 5 days for reevaluation. Return to the 

emergency department for any worsening symptoms.











PINA LIRA MD Sep 22, 2017 06:42

## 2017-09-22 NOTE — RAD
Indication left-sided flank pain for one week.



Axial images to the abdomen and pelvis were obtained. No oral contrast was

administered. 60 cc of Omnipaque 300 was administered intravenously. Note is

made of a previous examination 6 months ago.



There is a low-density mass in the right lobe of the liver appearing similar

to an examination 3/16/2011 and compatible with an incidental finding. An

acute or significant finding in the liver is not seen. The gallbladder is

grossly normal and the spleen appears unremarkable. There is a subtle

groundglass opacity involving the left lower lobe. The etiology is unclear.

Underlying inflammation accounting for the appearance is not excluded.

Postoperative changes compatible with mesh material are noted in the abdominal

wall. The pancreas appears unremarkable. No adrenal pathology is seen. There

is unchanged 5 mm calculus in the right kidney. Similar to the previous exam

there is mild hydroureter to the level of the pelvis where the ureter passes

the iliac vessels. An acute finding in the abdomen is not seen. Postoperative

changes in the pelvis are noted. No acute finding in the pelvis is seen.

Kyphoplasty changes are noted at L1. Degenerative changes are noted

predominantly at L5-S1.



IMPRESSION: No acute finding seen in the abdomen or pelvis

                          Groundglass opacity left lower lobe. Clinical

correlation advised

## 2017-12-13 ENCOUNTER — HOSPITAL ENCOUNTER (EMERGENCY)
Dept: HOSPITAL 61 - ER | Age: 57
Discharge: HOME | End: 2017-12-13
Payer: SELF-PAY

## 2017-12-13 VITALS — BODY MASS INDEX: 25.76 KG/M2 | HEIGHT: 68 IN | WEIGHT: 170 LBS

## 2017-12-13 VITALS — DIASTOLIC BLOOD PRESSURE: 75 MMHG | SYSTOLIC BLOOD PRESSURE: 153 MMHG

## 2017-12-13 DIAGNOSIS — Z87.442: ICD-10-CM

## 2017-12-13 DIAGNOSIS — I10: ICD-10-CM

## 2017-12-13 DIAGNOSIS — M54.89: ICD-10-CM

## 2017-12-13 DIAGNOSIS — J44.9: ICD-10-CM

## 2017-12-13 DIAGNOSIS — G89.29: ICD-10-CM

## 2017-12-13 DIAGNOSIS — N39.0: Primary | ICD-10-CM

## 2017-12-13 LAB
ALBUMIN SERPL-MCNC: 3.9 G/DL (ref 3.4–5)
ALBUMIN/GLOB SERPL: 0.9 {RATIO} (ref 1–1.7)
ALP SERPL-CCNC: 96 U/L (ref 46–116)
ALT SERPL-CCNC: 21 U/L (ref 14–59)
ANION GAP SERPL CALC-SCNC: 10 MMOL/L (ref 6–14)
AST SERPL-CCNC: 9 U/L (ref 15–37)
BACTERIA #/AREA URNS HPF: 0 /HPF
BASOPHILS # BLD AUTO: 0.1 X10^3/UL (ref 0–0.2)
BASOPHILS NFR BLD: 1 % (ref 0–3)
BILIRUB SERPL-MCNC: 0.2 MG/DL (ref 0.2–1)
BILIRUB UR QL STRIP: NEGATIVE
BUN SERPL-MCNC: 5 MG/DL (ref 7–20)
BUN/CREAT SERPL: 5 (ref 6–20)
CALCIUM SERPL-MCNC: 9.2 MG/DL (ref 8.5–10.1)
CHLORIDE SERPL-SCNC: 101 MMOL/L (ref 98–107)
CO2 SERPL-SCNC: 27 MMOL/L (ref 21–32)
CREAT SERPL-MCNC: 1 MG/DL (ref 0.6–1)
EOSINOPHIL NFR BLD: 1 % (ref 0–3)
ERYTHROCYTE [DISTWIDTH] IN BLOOD BY AUTOMATED COUNT: 14.2 % (ref 11.5–14.5)
GFR SERPLBLD BASED ON 1.73 SQ M-ARVRAT: 56.7 ML/MIN
GLOBULIN SER-MCNC: 4.2 G/DL (ref 2.2–3.8)
GLUCOSE SERPL-MCNC: 103 MG/DL (ref 70–99)
GLUCOSE UR STRIP-MCNC: NEGATIVE MG/DL
HCT VFR BLD CALC: 43.1 % (ref 36–47)
HGB BLD-MCNC: 14.4 G/DL (ref 12–15.5)
LYMPHOCYTES # BLD: 2.2 X10^3/UL (ref 1–4.8)
LYMPHOCYTES NFR BLD AUTO: 30 % (ref 24–48)
MCH RBC QN AUTO: 32 PG (ref 25–35)
MCHC RBC AUTO-ENTMCNC: 33 G/DL (ref 31–37)
MCV RBC AUTO: 95 FL (ref 79–100)
MONOCYTES NFR BLD: 6 % (ref 0–9)
NEUTROPHILS NFR BLD AUTO: 63 % (ref 31–73)
NITRITE UR QL STRIP: NEGATIVE
PH UR STRIP: 6 [PH]
PLATELET # BLD AUTO: 397 X10^3/UL (ref 140–400)
POTASSIUM SERPL-SCNC: 3.6 MMOL/L (ref 3.5–5.1)
PROT SERPL-MCNC: 8.1 G/DL (ref 6.4–8.2)
PROT UR STRIP-MCNC: NEGATIVE MG/DL
RBC # BLD AUTO: 4.52 X10^6/UL (ref 3.5–5.4)
RBC #/AREA URNS HPF: (no result) /HPF (ref 0–2)
SODIUM SERPL-SCNC: 138 MMOL/L (ref 136–145)
SP GR UR STRIP: <=1.005
UROBILINOGEN UR-MCNC: 0.2 MG/DL
WBC # BLD AUTO: 7.5 X10^3/UL (ref 4–11)
WBC #/AREA URNS HPF: (no result) /HPF (ref 0–4)

## 2017-12-13 PROCEDURE — 85025 COMPLETE CBC W/AUTO DIFF WBC: CPT

## 2017-12-13 PROCEDURE — 81001 URINALYSIS AUTO W/SCOPE: CPT

## 2017-12-13 PROCEDURE — 96375 TX/PRO/DX INJ NEW DRUG ADDON: CPT

## 2017-12-13 PROCEDURE — 96376 TX/PRO/DX INJ SAME DRUG ADON: CPT

## 2017-12-13 PROCEDURE — 80053 COMPREHEN METABOLIC PANEL: CPT

## 2017-12-13 PROCEDURE — 96374 THER/PROPH/DIAG INJ IV PUSH: CPT

## 2017-12-13 PROCEDURE — 36415 COLL VENOUS BLD VENIPUNCTURE: CPT

## 2017-12-13 PROCEDURE — 99285 EMERGENCY DEPT VISIT HI MDM: CPT

## 2017-12-13 PROCEDURE — 74176 CT ABD & PELVIS W/O CONTRAST: CPT

## 2017-12-13 PROCEDURE — 96361 HYDRATE IV INFUSION ADD-ON: CPT

## 2017-12-13 NOTE — PHYS DOC
Past Medical History


Past Medical History:  Cancer, COPD, Hypertension, Kidney Stone


Additional Past Medical Histor:  chronic pain, CERVICAL CANCER


Past Surgical History:  Other


Additional Past Surgical Histo:  Ureteral Stent, Laparoscopy, CANCER SX


Alcohol Use:  None


Drug Use:  None





Adult General


Chief Complaint


Chief Complaint:  FLANK PAIN





[facility]


HPI





Patient is a 57  year old female who presents with one day history of sudden 

onset of left flank pain radiating to the groin; denies dysuria or frequency 

hematuria or fever. Pain summer similar to previous kidney stones. 





Patient has chronic back pain and takes 30 mg of oxycodone at a time. No nausea 

vomiting diarrhea. Denies history of diabetes.





Review of Systems


Review of Systems





Constitutional: Denies fever or chills []


Eyes: Denies change in visual acuity, redness, or eye pain []


HENT: Denies nasal congestion or sore throat []


Respiratory: Denies cough or shortness of breath []


Cardiovascular: No additional information not addressed in HPI []


GI: Denies abdominal pain, nausea, vomiting, bloody stools or diarrhea []


: Denies dysuria or hematuria []


Musculoskeletal: Denies back pain or joint pain []


Integument: Denies rash or skin lesions []


Neurologic: Denies headache, focal weakness or sensory changes []


Endocrine: Denies polyuria or polydipsia []





All other systems were reviewed and found to be within normal limits, except as 

documented in this note.





Current Medications


Current Medications





Current Medications








 Medications


  (Trade)  Dose


 Ordered  Sig/Kimberli  Start Time


 Stop Time Status Last Admin


Dose Admin


 


 Hydromorphone HCl


  (Dilaudid)  0.5 mg  1X  ONCE  12/13/17 16:00


 12/13/17 16:02 DC 12/13/17 16:05


0.5 MG


 


 Ondansetron HCl


  (Zofran)  4 mg  1X  ONCE  12/13/17 14:30


 12/13/17 14:31 DC 12/13/17 14:26


4 MG


 


 Sodium Chloride  1,000 ml @ 


 1,000 mls/hr  1X  ONCE  12/13/17 14:30


 12/13/17 15:29 DC 12/13/17 14:25


1,000 MLS/HR











Allergies


Allergies





Allergies








Coded Allergies Type Severity Reaction Last Updated Verified


 


  No Known Drug Allergies    10/8/16 No











Physical Exam


Physical Exam





Constitutional: Well developed, well nourished, no acute distress, non-toxic 

appearance. []


HENT: Normocephalic, atraumatic, bilateral external ears normal, oropharynx 

moist, no oral exudates, nose normal. []


Eyes: PERRLA, EOMI, conjunctiva normal, no discharge. [] 


Neck: Normal range of motion, no tenderness, supple, no stridor. [] 


Cardiovascular:Heart rate regular rhythm, no murmur []


Lungs & Thorax:  Bilateral breath sounds clear to auscultation []


Abdomen: Bowel sounds normal, soft, no tenderness, no masses, no pulsatile 

masses. [] 


Skin: Warm, dry, no erythema, no rash. [] 


Back: No tenderness, hyperesthetic on the left CVA tenderness. [] 


Extremities: No tenderness, no cyanosis, no clubbing, ROM intact, no edema. [] 


Neurologic: Alert and oriented X 3, normal motor function, normal sensory 

function, no focal deficits noted. []


Psychologic: Affect normal, judgement normal, mood normal. []





Current Patient Data


Vital Signs





 Vital Signs








  Date Time  Temp Pulse Resp B/P (MAP) Pulse Ox O2 Delivery O2 Flow Rate FiO2


 


12/13/17 16:05   20  98 Room Air  


 


12/13/17 15:11  60  153/75 (101)    


 


12/13/17 13:58 97.9       





 97.9       








Lab Values





 Laboratory Tests








Test


  12/13/17


13:50 12/13/17


14:10


 


Urine Collection Type Unknown   


 


Urine Color Yellow   


 


Urine Clarity Clear   


 


Urine pH 6.0   


 


Urine Specific Gravity <=1.005   


 


Urine Protein


  Negative mg/dL


(NEG-TRACE) 


 


 


Urine Glucose (UA)


  Negative mg/dL


(NEG) 


 


 


Urine Ketones (Stick)


  Negative mg/dL


(NEG) 


 


 


Urine Blood Trace (NEG)   


 


Urine Nitrite


  Negative (NEG)


  


 


 


Urine Bilirubin


  Negative (NEG)


  


 


 


Urine Urobilinogen Dipstick


  0.2 mg/dL (0.2


mg/dL) 


 


 


Urine Leukocyte Esterase Large (NEG)   


 


Urine RBC


  Occ /HPF (0-2)


  


 


 


Urine WBC


  20-40 /HPF


(0-4) 


 


 


Urine Bacteria


  0 /HPF (0-FEW)


  


 


 


White Blood Count


  


  7.5 x10^3/uL


(4.0-11.0)


 


Red Blood Count


  


  4.52 x10^6/uL


(3.50-5.40)


 


Hemoglobin


  


  14.4 g/dL


(12.0-15.5)


 


Hematocrit


  


  43.1 %


(36.0-47.0)


 


Mean Corpuscular Volume


  


  95 fL ()


 


 


Mean Corpuscular Hemoglobin  32 pg (25-35)  


 


Mean Corpuscular Hemoglobin


Concent 


  33 g/dL


(31-37)


 


Red Cell Distribution Width


  


  14.2 %


(11.5-14.5)


 


Platelet Count


  


  397 x10^3/uL


(140-400)


 


Neutrophils (%) (Auto)  63 % (31-73)  


 


Lymphocytes (%) (Auto)  30 % (24-48)  


 


Monocytes (%) (Auto)  6 % (0-9)  


 


Eosinophils (%) (Auto)  1 % (0-3)  


 


Basophils (%) (Auto)  1 % (0-3)  


 


Neutrophils # (Auto)


  


  4.7 x10^3uL


(1.8-7.7)


 


Lymphocytes # (Auto)


  


  2.2 x10^3/uL


(1.0-4.8)


 


Monocytes # (Auto)


  


  0.5 x10^3/uL


(0.0-1.1)


 


Eosinophils # (Auto)


  


  0.0 x10^3/uL


(0.0-0.7)


 


Basophils # (Auto)


  


  0.1 x10^3/uL


(0.0-0.2)


 


Sodium Level


  


  138 mmol/L


(136-145)


 


Potassium Level


  


  3.6 mmol/L


(3.5-5.1)


 


Chloride Level


  


  101 mmol/L


()


 


Carbon Dioxide Level


  


  27 mmol/L


(21-32)


 


Anion Gap  10 (6-14)  


 


Blood Urea Nitrogen


  


  5 mg/dL (7-20)


L


 


Creatinine


  


  1.0 mg/dL


(0.6-1.0)


 


Estimated GFR


(Cockcroft-Gault) 


  56.7  


 


 


BUN/Creatinine Ratio  5 (6-20)  L


 


Glucose Level


  


  103 mg/dL


(70-99)  H


 


Calcium Level


  


  9.2 mg/dL


(8.5-10.1)


 


Total Bilirubin


  


  0.2 mg/dL


(0.2-1.0)


 


Aspartate Amino Transferase


(AST) 


  9 U/L (15-37)


L


 


Alanine Aminotransferase (ALT)


  


  21 U/L (14-59)


 


 


Alkaline Phosphatase


  


  96 U/L


()


 


Total Protein


  


  8.1 g/dL


(6.4-8.2)


 


Albumin


  


  3.9 g/dL


(3.4-5.0)


 


Albumin/Globulin Ratio


  


  0.9 (1.0-1.7)


L





 Laboratory Tests


12/13/17 14:10








 Laboratory Tests


12/13/17 14:10











EKG


EKG


[]





Radiology/Procedures


Radiology/Procedures


[]





Course & Med Decision Making


Course & Med Decision Making


Pertinent Labs and Imaging studies reviewed. (See chart for details)





Plan obtain labs urinalysis treat symptomatically and obtain a CT scan to 

evaluate for kidney stones.





Labs equivical for UTI,  CT no ureteral stone w some stranding around kidneys.  

Will treat for UTI.  pt has chronic pain and can follow up w PCP for further 

pain control.





Dragon Disclaimer


Dragon Disclaimer


This electronic medical record was generated, in whole or in part, using a 

voice recognition dictation system.





Departure


Departure


Impression:  


 Primary Impression:  


 UTI (urinary tract infection)


 Additional Impressions:  


 Intractable back pain


 Chronic pain


Disposition:  01 HOME, SELF-CARE


Condition:  STABLE


Referrals:  


ANDRES MICHELE MD (PCP)


Patient Instructions:  Chronic Back Pain, Urinary Tract Infection, Easy-to-Read


Scripts


Nitrofurantoin Monohyd/M-Cryst (MACROBID 100 MG CAPSULE) 100 Mg Capsule


1 CAP PO BID, #14 CAP


   Prov: PINA SHEA MD         12/13/17





Problem Qualifiers











PINA SHEA MD Dec 13, 2017 14:23

## 2017-12-13 NOTE — RAD
Indication: Left flank pain.



Technique: Axial images and coronal and sagittal reformatted images are

provided. Comparison is from September 22, 2017.



One or more of the following individualized dose reduction techniques were

utilized for this examination:

1. Automated exposure control

2. Adjustment of the mA and/or kV according to patient size

3. Use of iterative reconstruction technique



Findings: Calcified granuloma is noted in the lingula. There is no pleural

effusion. The heart is not enlarged.



Solid organ evaluation is limited without contrast. Liver, gallbladder,

pancreas, and adrenals are unremarkable. Benign calcifications are noted in

the spleen. There is a nonobstructing right renal calculus measuring 6 mm.

Right ureter is mildly dilated, without any ureteral stone apparent. No

obstructing or nonobstructing calculus on the left is identified, left ureter

normal caliber. There is stranding of the perinephric fat bilaterally. There

is atheromatous disease in the abdominal aorta without aneurysm. There is no

dilated small bowel loop or air-fluid level. There is a normal appendix. Colon

is grossly unremarkable. Ventral hernia repair is noted.



There is no bladder calculus. Uterus is absent. Clips are noted in the pelvis

on the right. There are degenerative changes in the spine with

dextrocurvature. There has been treated compression fracture at L1.



Impression:

1. Nonobstructing right renal calculus.

2. Mildly dilated right ureter without obstructing etiology apparent.

Differential considerations would include recently passed calculus as well as

ureteritis and pyelonephritis. Please correlate with UA/UC.

## 2017-12-26 ENCOUNTER — HOSPITAL ENCOUNTER (EMERGENCY)
Dept: HOSPITAL 61 - ER | Age: 57
Discharge: HOME | End: 2017-12-26
Payer: SELF-PAY

## 2017-12-26 DIAGNOSIS — J44.9: ICD-10-CM

## 2017-12-26 DIAGNOSIS — Z92.3: ICD-10-CM

## 2017-12-26 DIAGNOSIS — N30.00: Primary | ICD-10-CM

## 2017-12-26 DIAGNOSIS — Z96.0: ICD-10-CM

## 2017-12-26 DIAGNOSIS — Z92.21: ICD-10-CM

## 2017-12-26 DIAGNOSIS — I10: ICD-10-CM

## 2017-12-26 DIAGNOSIS — G89.29: ICD-10-CM

## 2017-12-26 DIAGNOSIS — Z87.442: ICD-10-CM

## 2017-12-26 LAB
ADD MAN DIFF?: NO
ALBUMIN SERPL-MCNC: 3.7 G/DL (ref 3.4–5)
ALP SERPL-CCNC: 88 U/L (ref 46–116)
ALT (SGPT): 22 U/L (ref 14–59)
ANION GAP SERPL CALC-SCNC: 12 MMOL/L (ref 6–14)
AST SERPL-CCNC: 19 U/L (ref 15–37)
BACTERIA,URINE: (no result) /HPF
BARBITURATES: (no result)
BASO #: 0.1 X10^3/UL (ref 0–0.2)
BASO %: 1 % (ref 0–3)
BENZODIAZEPINES: (no result)
BILIRUB DIRECT SERPL-MCNC: 0.1 MG/DL (ref 0–0.2)
BILIRUBIN,URINE: NEGATIVE
BLOOD UREA NITROGEN: 13 MG/DL (ref 7–20)
CALCIUM: 9.1 MG/DL (ref 8.5–10.1)
CANNABINOIDS: (no result)
CHLORIDE: 103 MMOL/L (ref 98–107)
CK SERPL-CCNC: 85 U/L (ref 26–192)
CKMB INDEX: 2.1 % (ref 0–4)
CKMB MASS: 1.8 NG/ML (ref 0–3.6)
CO2 SERPL-SCNC: 27 MMOL/L (ref 21–32)
COCAINE: (no result)
CREAT SERPL-MCNC: 0.9 MG/DL (ref 0.6–1)
EOS %: 1 % (ref 0–3)
ETHANOL, URINE: (no result)
GFR SERPLBLD BASED ON 1.73 SQ M-ARVRAT: 64.1 ML/MIN
GLUCOSE SERPL-MCNC: 97 MG/DL (ref 70–99)
GLUCOSE,URINE: NEGATIVE MG/DL
HCG SERPL-ACNC: 8.5 X10^3/UL (ref 4–11)
HEMATOCRIT: 40 % (ref 36–47)
HEMOGLOBIN: 13.4 G/DL (ref 12–15.5)
INR: 1 (ref 0.8–1.1)
LYMPH #: 2.3 X10^3/UL (ref 1–4.8)
LYMPH %: 28 % (ref 24–48)
MEAN CORPUSCULAR HEMOGLOBIN: 32 PG (ref 25–35)
MEAN CORPUSCULAR HGB CONC: 33 G/DL (ref 31–37)
MEAN CORPUSCULAR VOLUME: 95 FL (ref 79–100)
METHADONE: (no result)
MONO %: 8 % (ref 0–9)
NEUT %: 64 % (ref 31–73)
NITRITE,URINE: NEGATIVE
OPIATES: (no result)
PARTIAL THROMBOPLASTIN TIME: 26 SEC (ref 24–38)
PH,URINE: 6.5
PHENCYCLIDINE: (no result)
PLATELET COUNT: 317 X10^3/UL (ref 140–400)
POTASSIUM SERPL-SCNC: 3.5 MMOL/L (ref 3.5–5.1)
PROTEIN,URINE: NEGATIVE MG/DL
PROTHROMBIN TIME PATIENT: 12.8 SEC (ref 11.7–14)
RBC,URINE: (no result) /HPF (ref 0–2)
RED BLOOD COUNT: 4.22 X10^6/UL (ref 3.5–5.4)
RED CELL DISTRIBUTION WIDTH: 14.5 % (ref 11.5–14.5)
SODIUM: 142 MMOL/L (ref 136–145)
SPECIFIC GRAVITY,URINE: <=1.005
SQUAMOUS EPITHELIAL CELL,UR: (no result) /LPF
TOTAL BILIRUBIN: 0.2 MG/DL (ref 0.2–1)
TOTAL PROTEIN: 7.6 G/DL (ref 6.4–8.2)
UROBILINOGEN,URINE: 0.2 MG/DL
WBC,URINE: >40 /HPF (ref 0–4)

## 2017-12-26 PROCEDURE — 96365 THER/PROPH/DIAG IV INF INIT: CPT

## 2017-12-26 PROCEDURE — 81001 URINALYSIS AUTO W/SCOPE: CPT

## 2017-12-26 PROCEDURE — 85730 THROMBOPLASTIN TIME PARTIAL: CPT

## 2017-12-26 PROCEDURE — 74177 CT ABD & PELVIS W/CONTRAST: CPT

## 2017-12-26 PROCEDURE — 80307 DRUG TEST PRSMV CHEM ANLYZR: CPT

## 2017-12-26 PROCEDURE — 80076 HEPATIC FUNCTION PANEL: CPT

## 2017-12-26 PROCEDURE — 99285 EMERGENCY DEPT VISIT HI MDM: CPT

## 2017-12-26 PROCEDURE — 85610 PROTHROMBIN TIME: CPT

## 2017-12-26 PROCEDURE — 82553 CREATINE MB FRACTION: CPT

## 2017-12-26 PROCEDURE — 96375 TX/PRO/DX INJ NEW DRUG ADDON: CPT

## 2017-12-26 PROCEDURE — 36415 COLL VENOUS BLD VENIPUNCTURE: CPT

## 2017-12-26 PROCEDURE — 96361 HYDRATE IV INFUSION ADD-ON: CPT

## 2017-12-26 PROCEDURE — 87086 URINE CULTURE/COLONY COUNT: CPT

## 2017-12-26 PROCEDURE — 96376 TX/PRO/DX INJ SAME DRUG ADON: CPT

## 2017-12-26 PROCEDURE — 83690 ASSAY OF LIPASE: CPT

## 2017-12-26 PROCEDURE — 85025 COMPLETE CBC W/AUTO DIFF WBC: CPT

## 2017-12-26 PROCEDURE — 80048 BASIC METABOLIC PNL TOTAL CA: CPT

## 2017-12-26 RX ADMIN — IOHEXOL 1 ML: 300 INJECTION, SOLUTION INTRAVENOUS at 20:45

## 2017-12-26 RX ADMIN — BACITRACIN 1 MLS/HR: 5000 INJECTION, POWDER, FOR SOLUTION INTRAMUSCULAR at 20:08

## 2017-12-26 RX ADMIN — MORPHINE SULFATE 1 MG: 2 INJECTION, SOLUTION INTRAMUSCULAR; INTRAVENOUS at 20:09

## 2017-12-26 RX ADMIN — MORPHINE SULFATE 1 MG: 2 INJECTION, SOLUTION INTRAMUSCULAR; INTRAVENOUS at 21:02

## 2017-12-26 RX ADMIN — ONDANSETRON 1 MG: 2 INJECTION INTRAMUSCULAR; INTRAVENOUS at 20:09

## 2017-12-26 RX ADMIN — IOHEXOL 1 ML: 240 INJECTION, SOLUTION INTRATHECAL; INTRAVASCULAR; INTRAVENOUS; ORAL at 20:45

## 2017-12-26 RX ADMIN — MORPHINE SULFATE 1 MG: 2 INJECTION, SOLUTION INTRAMUSCULAR; INTRAVENOUS at 20:30

## 2018-03-07 ENCOUNTER — HOSPITAL ENCOUNTER (EMERGENCY)
Dept: HOSPITAL 61 - ER | Age: 58
Discharge: HOME | End: 2018-03-07
Payer: SELF-PAY

## 2018-03-07 DIAGNOSIS — M54.5: ICD-10-CM

## 2018-03-07 DIAGNOSIS — I10: ICD-10-CM

## 2018-03-07 DIAGNOSIS — J44.9: ICD-10-CM

## 2018-03-07 DIAGNOSIS — R10.32: Primary | ICD-10-CM

## 2018-03-07 DIAGNOSIS — Z87.442: ICD-10-CM

## 2018-03-07 DIAGNOSIS — G89.29: ICD-10-CM

## 2018-03-07 LAB
ADD MAN DIFF?: NO
ALBUMIN SERPL-MCNC: 3.7 G/DL (ref 3.4–5)
ALBUMIN/GLOB SERPL: 0.9 {RATIO} (ref 1–1.7)
ALP SERPL-CCNC: 127 U/L (ref 46–116)
ALT (SGPT): 39 U/L (ref 14–59)
ANION GAP SERPL CALC-SCNC: 12 MMOL/L (ref 6–14)
AST SERPL-CCNC: 38 U/L (ref 15–37)
BACTERIA,URINE: 0 /HPF
BASO #: 0.1 X10^3/UL (ref 0–0.2)
BASO %: 1 % (ref 0–3)
BILIRUBIN,URINE: NEGATIVE
BLOOD UREA NITROGEN: 5 MG/DL (ref 7–20)
BUN/CREAT SERPL: 6 (ref 6–20)
CALCIUM: 9.4 MG/DL (ref 8.5–10.1)
CHLORIDE: 101 MMOL/L (ref 98–107)
CLARITY,URINE: CLEAR
CO2 SERPL-SCNC: 28 MMOL/L (ref 21–32)
COLOR,URINE: YELLOW
CREAT SERPL-MCNC: 0.9 MG/DL (ref 0.6–1)
EOS #: 0.1 X10^3/UL (ref 0–0.7)
EOS %: 1 % (ref 0–3)
GFR SERPLBLD BASED ON 1.73 SQ M-ARVRAT: 64.1 ML/MIN
GLOBULIN SER-MCNC: 4.1 G/DL (ref 2.2–3.8)
GLUCOSE SERPL-MCNC: 97 MG/DL (ref 70–99)
GLUCOSE,URINE: NEGATIVE MG/DL
HCG SERPL-ACNC: 10.3 X10^3/UL (ref 4–11)
HEMATOCRIT: 40.8 % (ref 36–47)
HEMOGLOBIN: 13.8 G/DL (ref 12–15.5)
LIPASE: 57 U/L (ref 73–393)
LYMPH #: 2.1 X10^3/UL (ref 1–4.8)
LYMPH %: 21 % (ref 24–48)
MAGNESIUM: 2 MG/DL (ref 1.8–2.4)
MEAN CORPUSCULAR HEMOGLOBIN: 32 PG (ref 25–35)
MEAN CORPUSCULAR HGB CONC: 34 G/DL (ref 31–37)
MEAN CORPUSCULAR VOLUME: 94 FL (ref 79–100)
MONO #: 0.7 X10^3/UL (ref 0–1.1)
MONO %: 7 % (ref 0–9)
NEUT #: 7.3 X10^3UL (ref 1.8–7.7)
NEUT %: 71 % (ref 31–73)
NITRITE,URINE: NEGATIVE
PH,URINE: 7.5
PLATELET COUNT: 405 X10^3/UL (ref 140–400)
POTASSIUM SERPL-SCNC: 4.1 MMOL/L (ref 3.5–5.1)
PROTEIN,URINE: NEGATIVE MG/DL
RBC,URINE: 0 /HPF (ref 0–2)
RED BLOOD COUNT: 4.33 X10^6/UL (ref 3.5–5.4)
RED CELL DISTRIBUTION WIDTH: 14.9 % (ref 11.5–14.5)
SODIUM: 141 MMOL/L (ref 136–145)
SPECIFIC GRAVITY,URINE: 1.01
SQUAMOUS EPITHELIAL CELL,UR: (no result) /LPF
TOTAL BILIRUBIN: 0.2 MG/DL (ref 0.2–1)
TOTAL PROTEIN: 7.8 G/DL (ref 6.4–8.2)
UROBILINOGEN,URINE: 0.2 MG/DL
WBC,URINE: 0 /HPF (ref 0–4)

## 2018-03-07 PROCEDURE — 96374 THER/PROPH/DIAG INJ IV PUSH: CPT

## 2018-03-07 PROCEDURE — 85025 COMPLETE CBC W/AUTO DIFF WBC: CPT

## 2018-03-07 PROCEDURE — 96361 HYDRATE IV INFUSION ADD-ON: CPT

## 2018-03-07 PROCEDURE — 74177 CT ABD & PELVIS W/CONTRAST: CPT

## 2018-03-07 PROCEDURE — 83690 ASSAY OF LIPASE: CPT

## 2018-03-07 PROCEDURE — 83735 ASSAY OF MAGNESIUM: CPT

## 2018-03-07 PROCEDURE — 36415 COLL VENOUS BLD VENIPUNCTURE: CPT

## 2018-03-07 PROCEDURE — 80053 COMPREHEN METABOLIC PANEL: CPT

## 2018-03-07 PROCEDURE — 96375 TX/PRO/DX INJ NEW DRUG ADDON: CPT

## 2018-03-07 PROCEDURE — 99285 EMERGENCY DEPT VISIT HI MDM: CPT

## 2018-03-07 PROCEDURE — 96376 TX/PRO/DX INJ SAME DRUG ADON: CPT

## 2018-03-07 PROCEDURE — 81001 URINALYSIS AUTO W/SCOPE: CPT

## 2018-03-07 RX ADMIN — MORPHINE SULFATE 1 MG: 4 INJECTION, SOLUTION INTRAMUSCULAR; INTRAVENOUS at 19:40

## 2018-03-07 RX ADMIN — BACITRACIN 1 MLS/HR: 5000 INJECTION, POWDER, FOR SOLUTION INTRAMUSCULAR at 19:39

## 2018-03-07 RX ADMIN — IOHEXOL 1 ML: 300 INJECTION, SOLUTION INTRAVENOUS at 19:45

## 2018-03-07 RX ADMIN — KETOROLAC TROMETHAMINE 1 MG: 30 INJECTION, SOLUTION INTRAMUSCULAR at 19:40

## 2018-03-07 RX ADMIN — MORPHINE SULFATE 1 MG: 10 INJECTION INTRAMUSCULAR; INTRAVENOUS; SUBCUTANEOUS at 20:48

## 2018-03-07 RX ADMIN — ONDANSETRON 1 MG: 2 INJECTION INTRAMUSCULAR; INTRAVENOUS at 19:41

## 2018-06-28 ENCOUNTER — HOSPITAL ENCOUNTER (EMERGENCY)
Dept: HOSPITAL 61 - ER | Age: 58
Discharge: HOME | End: 2018-06-28
Payer: SELF-PAY

## 2018-06-28 DIAGNOSIS — I10: ICD-10-CM

## 2018-06-28 DIAGNOSIS — M54.6: Primary | ICD-10-CM

## 2018-06-28 DIAGNOSIS — M54.5: ICD-10-CM

## 2018-06-28 DIAGNOSIS — G89.29: ICD-10-CM

## 2018-06-28 DIAGNOSIS — J44.9: ICD-10-CM

## 2018-06-28 LAB
ADD MAN DIFF?: NO
ALBUMIN SERPL-MCNC: 4.1 G/DL (ref 3.4–5)
ALBUMIN/GLOB SERPL: 1.1 {RATIO} (ref 1–1.7)
ALP SERPL-CCNC: 86 U/L (ref 46–116)
ALT (SGPT): 32 U/L (ref 14–59)
ANION GAP SERPL CALC-SCNC: 11 MMOL/L (ref 6–14)
AST SERPL-CCNC: 26 U/L (ref 15–37)
BASO #: 0.1 X10^3/UL (ref 0–0.2)
BASO %: 1 % (ref 0–3)
BLOOD UREA NITROGEN: 8 MG/DL (ref 7–20)
BUN/CREAT SERPL: 8 (ref 6–20)
CALCIUM: 9.6 MG/DL (ref 8.5–10.1)
CHLORIDE: 102 MMOL/L (ref 98–107)
CK SERPL-CCNC: 158 U/L (ref 26–192)
CKMB INDEX: 2.7 % (ref 0–4)
CKMB MASS: 4.3 NG/ML (ref 0–3.6)
CO2 SERPL-SCNC: 29 MMOL/L (ref 21–32)
CREAT SERPL-MCNC: 1 MG/DL (ref 0.6–1)
D-DIMER: 0.45 UG/MLFEU (ref 0–0.5)
EOS #: 0.1 X10^3/UL (ref 0–0.7)
EOS %: 1 % (ref 0–3)
GFR SERPLBLD BASED ON 1.73 SQ M-ARVRAT: 56.7 ML/MIN
GLOBULIN SER-MCNC: 3.9 G/DL (ref 2.2–3.8)
GLUCOSE SERPL-MCNC: 91 MG/DL (ref 70–99)
HCG SERPL-ACNC: 6.6 X10^3/UL (ref 4–11)
HEMATOCRIT: 39.2 % (ref 36–47)
HEMOGLOBIN: 13.8 G/DL (ref 12–15.5)
LYMPH #: 2.1 X10^3/UL (ref 1–4.8)
LYMPH %: 32 % (ref 24–48)
MEAN CORPUSCULAR HEMOGLOBIN: 34 PG (ref 25–35)
MEAN CORPUSCULAR HGB CONC: 35 G/DL (ref 31–37)
MEAN CORPUSCULAR VOLUME: 97 FL (ref 79–100)
MONO #: 0.5 X10^3/UL (ref 0–1.1)
MONO %: 7 % (ref 0–9)
NEUT #: 3.9 X10^3UL (ref 1.8–7.7)
NEUT %: 59 % (ref 31–73)
NT-PRO BNP: 224 PG/ML (ref 0–124)
PLATELET COUNT: 317 X10^3/UL (ref 140–400)
POTASSIUM SERPL-SCNC: 3.2 MMOL/L (ref 3.5–5.1)
RED BLOOD COUNT: 4.03 X10^6/UL (ref 3.5–5.4)
RED CELL DISTRIBUTION WIDTH: 13.5 % (ref 11.5–14.5)
SODIUM: 142 MMOL/L (ref 136–145)
TOTAL BILIRUBIN: 0.3 MG/DL (ref 0.2–1)
TOTAL PROTEIN: 8 G/DL (ref 6.4–8.2)
TROPONINI: < 0.017 NG/ML (ref 0–0.06)

## 2018-06-28 PROCEDURE — 71046 X-RAY EXAM CHEST 2 VIEWS: CPT

## 2018-06-28 PROCEDURE — 36415 COLL VENOUS BLD VENIPUNCTURE: CPT

## 2018-06-28 PROCEDURE — 84484 ASSAY OF TROPONIN QUANT: CPT

## 2018-06-28 PROCEDURE — 85379 FIBRIN DEGRADATION QUANT: CPT

## 2018-06-28 PROCEDURE — 93005 ELECTROCARDIOGRAM TRACING: CPT

## 2018-06-28 PROCEDURE — 85025 COMPLETE CBC W/AUTO DIFF WBC: CPT

## 2018-06-28 PROCEDURE — 80053 COMPREHEN METABOLIC PANEL: CPT

## 2018-06-28 PROCEDURE — 83880 ASSAY OF NATRIURETIC PEPTIDE: CPT

## 2018-06-28 PROCEDURE — 94640 AIRWAY INHALATION TREATMENT: CPT

## 2018-06-28 PROCEDURE — 82553 CREATINE MB FRACTION: CPT

## 2018-06-28 PROCEDURE — 99285 EMERGENCY DEPT VISIT HI MDM: CPT

## 2018-06-28 RX ADMIN — POTASSIUM CHLORIDE 1 MEQ: 1500 TABLET, EXTENDED RELEASE ORAL at 19:16

## 2018-06-28 RX ADMIN — IPRATROPIUM BROMIDE AND ALBUTEROL SULFATE 1 ML: .5; 3 SOLUTION RESPIRATORY (INHALATION) at 17:34

## 2018-06-29 ENCOUNTER — HOSPITAL ENCOUNTER (EMERGENCY)
Dept: HOSPITAL 61 - ER | Age: 58
Discharge: LEFT BEFORE DISCHARGE | End: 2018-06-29
Payer: SELF-PAY

## 2018-06-29 DIAGNOSIS — M54.5: ICD-10-CM

## 2018-06-29 DIAGNOSIS — J44.9: ICD-10-CM

## 2018-06-29 DIAGNOSIS — M54.6: ICD-10-CM

## 2018-06-29 DIAGNOSIS — G43.909: ICD-10-CM

## 2018-06-29 DIAGNOSIS — G89.29: Primary | ICD-10-CM

## 2018-06-29 DIAGNOSIS — I10: ICD-10-CM

## 2018-06-29 PROCEDURE — 99281 EMR DPT VST MAYX REQ PHY/QHP: CPT

## 2019-09-02 ENCOUNTER — HOSPITAL ENCOUNTER (EMERGENCY)
Dept: HOSPITAL 61 - ER | Age: 59
Discharge: HOME | End: 2019-09-02
Payer: SELF-PAY

## 2019-09-02 VITALS — HEIGHT: 68 IN | WEIGHT: 180 LBS | BODY MASS INDEX: 27.28 KG/M2

## 2019-09-02 VITALS — SYSTOLIC BLOOD PRESSURE: 166 MMHG | DIASTOLIC BLOOD PRESSURE: 96 MMHG

## 2019-09-02 DIAGNOSIS — J44.9: ICD-10-CM

## 2019-09-02 DIAGNOSIS — I10: ICD-10-CM

## 2019-09-02 DIAGNOSIS — R10.9: Primary | ICD-10-CM

## 2019-09-02 DIAGNOSIS — Z87.442: ICD-10-CM

## 2019-09-02 DIAGNOSIS — G43.909: ICD-10-CM

## 2019-09-02 DIAGNOSIS — G89.29: ICD-10-CM

## 2019-09-02 LAB
ALBUMIN SERPL-MCNC: 4.3 G/DL (ref 3.4–5)
ALBUMIN/GLOB SERPL: 1 {RATIO} (ref 1–1.7)
ALP SERPL-CCNC: 92 U/L (ref 46–116)
ALT SERPL-CCNC: 28 U/L (ref 14–59)
ANION GAP SERPL CALC-SCNC: 11 MMOL/L (ref 6–14)
APTT PPP: YELLOW S
AST SERPL-CCNC: 27 U/L (ref 15–37)
BACTERIA #/AREA URNS HPF: (no result) /HPF
BASOPHILS # BLD AUTO: 0.1 X10^3/UL (ref 0–0.2)
BASOPHILS NFR BLD: 1 % (ref 0–3)
BILIRUB SERPL-MCNC: 0.4 MG/DL (ref 0.2–1)
BILIRUB UR QL STRIP: NEGATIVE
BUN SERPL-MCNC: 9 MG/DL (ref 7–20)
BUN/CREAT SERPL: 8 (ref 6–20)
CALCIUM SERPL-MCNC: 9.7 MG/DL (ref 8.5–10.1)
CHLORIDE SERPL-SCNC: 100 MMOL/L (ref 98–107)
CO2 SERPL-SCNC: 28 MMOL/L (ref 21–32)
CREAT SERPL-MCNC: 1.1 MG/DL (ref 0.6–1)
EOSINOPHIL NFR BLD: 0 % (ref 0–3)
EOSINOPHIL NFR BLD: 0 X10^3/UL (ref 0–0.7)
ERYTHROCYTE [DISTWIDTH] IN BLOOD BY AUTOMATED COUNT: 14.7 % (ref 11.5–14.5)
FIBRINOGEN PPP-MCNC: CLEAR MG/DL
GFR SERPLBLD BASED ON 1.73 SQ M-ARVRAT: 50.5 ML/MIN
GLOBULIN SER-MCNC: 4.3 G/DL (ref 2.2–3.8)
GLUCOSE SERPL-MCNC: 100 MG/DL (ref 70–99)
HCT VFR BLD CALC: 40.3 % (ref 36–47)
HGB BLD-MCNC: 14.1 G/DL (ref 12–15.5)
LYMPHOCYTES # BLD: 1.9 X10^3/UL (ref 1–4.8)
LYMPHOCYTES NFR BLD AUTO: 24 % (ref 24–48)
MCH RBC QN AUTO: 35 PG (ref 25–35)
MCHC RBC AUTO-ENTMCNC: 35 G/DL (ref 31–37)
MCV RBC AUTO: 99 FL (ref 79–100)
MONO #: 0.5 X10^3/UL (ref 0–1.1)
MONOCYTES NFR BLD: 6 % (ref 0–9)
NEUT #: 5.3 X10^3/UL (ref 1.8–7.7)
NEUTROPHILS NFR BLD AUTO: 69 % (ref 31–73)
NITRITE UR QL STRIP: NEGATIVE
PH UR STRIP: 6.5 [PH]
PLATELET # BLD AUTO: 329 X10^3/UL (ref 140–400)
POTASSIUM SERPL-SCNC: 3.4 MMOL/L (ref 3.5–5.1)
PROT SERPL-MCNC: 8.6 G/DL (ref 6.4–8.2)
PROT UR STRIP-MCNC: NEGATIVE MG/DL
RBC # BLD AUTO: 4.1 X10^6/UL (ref 3.5–5.4)
RBC #/AREA URNS HPF: (no result) /HPF (ref 0–2)
SODIUM SERPL-SCNC: 139 MMOL/L (ref 136–145)
SQUAMOUS #/AREA URNS LPF: (no result) /LPF
UROBILINOGEN UR-MCNC: 0.2 MG/DL
WBC # BLD AUTO: 7.7 X10^3/UL (ref 4–11)
WBC #/AREA URNS HPF: (no result) /HPF (ref 0–4)

## 2019-09-02 PROCEDURE — 74176 CT ABD & PELVIS W/O CONTRAST: CPT

## 2019-09-02 PROCEDURE — 80053 COMPREHEN METABOLIC PANEL: CPT

## 2019-09-02 PROCEDURE — 96375 TX/PRO/DX INJ NEW DRUG ADDON: CPT

## 2019-09-02 PROCEDURE — 96374 THER/PROPH/DIAG INJ IV PUSH: CPT

## 2019-09-02 PROCEDURE — 85025 COMPLETE CBC W/AUTO DIFF WBC: CPT

## 2019-09-02 PROCEDURE — 99285 EMERGENCY DEPT VISIT HI MDM: CPT

## 2019-09-02 PROCEDURE — 81001 URINALYSIS AUTO W/SCOPE: CPT

## 2019-09-02 PROCEDURE — 36415 COLL VENOUS BLD VENIPUNCTURE: CPT

## 2019-09-02 PROCEDURE — 87086 URINE CULTURE/COLONY COUNT: CPT

## 2019-09-02 PROCEDURE — 96376 TX/PRO/DX INJ SAME DRUG ADON: CPT

## 2019-09-02 NOTE — PHYS DOC
Past Medical History


Past Medical History:  Cancer, COPD, Hypertension, Kidney Stone, Migraines


Additional Past Medical Histor:  chronic pain, CERVICAL CANCER


 (MEGHANA TOMAS MD)


Past Surgical History:  Other


Additional Past Surgical Histo:  Ureteral Stent, Laparoscopy, CANCER SX


 (MEGHANA TOMAS MD)


Alcohol Use:  None


Drug Use:  None


 (MEGHANA TOMAS MD)





Adult General


Chief Complaint


Chief Complaint:  FLANK PAIN





HPI


HPI





Patient is a 59  year old female presents with back pain left sided feels like 

kidney stone sharp severe comes in waves not really moving to sitting on the 

left side usually back pain muscle pain that she also has goes away on its own. 

This is pretty severe no relief with usual agents no dysuria no fever no 

vomiting


 (MEGHANA TOMAS MD)





Review of Systems


Review of Systems





Constitutional: Denies fever or chills []


Eyes: Denies change in visual acuity, redness, or eye pain []


HENT: Denies nasal congestion or sore throat []


Respiratory: Denies cough or shortness of breath []





Neurologic: Denies headache, focal weakness or sensory changes []


Endocrine: Denies polyuria or polydipsia []





All other systems were reviewed and found to be within normal limits, except as 

documented in this note.


 (MEGHANA TOMAS MD)





Current Medications


Current Medications





Current Medications








 Medications


  (Trade)  Dose


 Ordered  Sig/Kimberli  Start Time


 Stop Time Status Last Admin


Dose Admin


 


 Dexamethasone


  (Decadron)  10 mg  1X  ONCE  9/2/19 19:00


 9/2/19 19:06 DC 9/2/19 19:49


10 MG


 


 Fentanyl Citrate


  (Fentanyl 2ml


 Vial)  50 mcg  1X  ONCE  9/2/19 18:45


 9/2/19 18:48 DC 9/2/19 18:59


50 MCG


 


 Ketorolac


 Tromethamine


  (Toradol 15mg


 Vial)  15 mg  1X  ONCE  9/2/19 17:45


 9/2/19 17:46 DC 9/2/19 17:55


15 MG


 


 Lidocaine


  (Lidoderm)  1 patch  1X  ONCE  9/2/19 19:00


 9/2/19 19:06 DC 9/2/19 19:49


1 PATCH


 


 Miscellaneous


  (Lidoderm Patch


 Removal)  1 ea  QHS  9/2/19 21:00


 9/2/19 20:02 DC  





 


 Ondansetron HCl


  (Zofran)  4 mg  1X  ONCE  9/2/19 17:45


 9/2/19 17:46 DC 9/2/19 17:55


4 MG


 


 Sodium Chloride  1,000 ml @ 


 1,000 mls/hr  1X  ONCE  9/2/19 17:45


 9/2/19 18:44 DC 9/2/19 17:55


1,000 MLS/HR





 (DIMAS PAUL DO)





Allergies


Allergies





Allergies








Coded Allergies Type Severity Reaction Last Updated Verified


 


  No Known Drug Allergies    10/8/16 No





 (DIMAS PAUL DO)





Physical Exam


Physical Exam





Constitutional: Well developed, well nourished, no acute distress, non-toxic 

appearance. []


HENT: Normocephalic, atraumatic, bilateral external ears normal, oropharynx 

moist, no oral exudates, nose normal. []


Eyes: PERRLA, EOMI, conjunctiva normal, no discharge. [] 


Pulmonary: Normal respiratory effort no increased work of breathing no obvious 

chest wall trauma


Abdomen: Bowel sounds normal, soft, no tenderness, no masses, no pulsatile 

masses. [] 


Skin: Warm, dry, no erythema, no rash. [] 


Back: There is reproducible back tenderness paraspinous on the left


Extremities: No tenderness, no cyanosis, no clubbing, ROM intact, no edema. [] 


Neurologic: Alert and oriented X 3, normal motor function, normal sensory 

function, no focal deficits noted. []


Psychologic: Affect normal, judgement normal, mood normal. []


 (MEGHANA TOMAS MD)


Physical Exam


Constitutional: Well developed, well nourished, uncomfortable, non-toxic 

appearance


HENT: Normocephalic, atraumatic, oropharynx moist


Eyes: Conjunctiva normal, no discharge


Neck: Normal range of motion, no tenderness, supple


Abdomen: Soft, no tenderness


Skin: Warm, dry, no erythema, no rash


Back: No midline tenderness, left paraspinal lower thoracic/upper lumbar pain


Extremities: No tenderness, ROM intact, no edema


Neurologic: Alert and oriented X 3, no focal deficits noted


Psychologic: Affect normal, judgement normal


 (DIMAS PAUL DO)





Current Patient Data


Vital Signs





                                   Vital Signs








  Date Time  Temp Pulse Resp B/P (MAP) Pulse Ox O2 Delivery O2 Flow Rate FiO2


 


9/2/19 19:26  70 14 166/96 (119) 97 Room Air  


 


9/2/19 17:42 97.6       





 97.6       





 (DIMAS PAUL DO)


Lab Values





                                Laboratory Tests








Test


 9/2/19


17:25 9/2/19


17:40


 


Urine Collection Type Unknown   


 


Urine Color Yellow   


 


Urine Clarity Clear   


 


Urine pH 6.5   


 


Urine Specific Gravity <=1.005   


 


Urine Protein


 Negative mg/dL


(NEG-TRACE) 





 


Urine Glucose (UA)


 Negative mg/dL


(NEG) 





 


Urine Ketones (Stick)


 Negative mg/dL


(NEG) 





 


Urine Blood


 Negative (NEG)


 





 


Urine Nitrite


 Negative (NEG)


 





 


Urine Bilirubin


 Negative (NEG)


 





 


Urine Urobilinogen Dipstick


 0.2 mg/dL (0.2


mg/dL) 





 


Urine Leukocyte Esterase Small (NEG)   


 


Urine RBC


 1-2 /HPF (0-2)


 





 


Urine WBC


 5-10 /HPF


(0-4) 





 


Urine Squamous Epithelial


Cells Mod /LPF  


 





 


Urine Bacteria


 Few /HPF


(0-FEW) 





 


White Blood Count


 


 7.7 x10^3/uL


(4.0-11.0)


 


Red Blood Count


 


 4.10 x10^6/uL


(3.50-5.40)


 


Hemoglobin


 


 14.1 g/dL


(12.0-15.5)


 


Hematocrit


 


 40.3 %


(36.0-47.0)


 


Mean Corpuscular Volume


 


 99 fL ()





 


Mean Corpuscular Hemoglobin  35 pg (25-35)  


 


Mean Corpuscular Hemoglobin


Concent 


 35 g/dL


(31-37)


 


Red Cell Distribution Width


 


 14.7 %


(11.5-14.5)  H


 


Platelet Count


 


 329 x10^3/uL


(140-400)


 


Neutrophils (%) (Auto)  69 % (31-73)  


 


Lymphocytes (%) (Auto)  24 % (24-48)  


 


Monocytes (%) (Auto)  6 % (0-9)  


 


Eosinophils (%) (Auto)  0 % (0-3)  


 


Basophils (%) (Auto)  1 % (0-3)  


 


Neutrophils # (Auto)


 


 5.3 x10^3/uL


(1.8-7.7)


 


Lymphocytes # (Auto)


 


 1.9 x10^3/uL


(1.0-4.8)


 


Monocytes # (Auto)


 


 0.5 x10^3/uL


(0.0-1.1)


 


Eosinophils # (Auto)


 


 0.0 x10^3/uL


(0.0-0.7)


 


Basophils # (Auto)


 


 0.1 x10^3/uL


(0.0-0.2)


 


Sodium Level


 


 139 mmol/L


(136-145)


 


Potassium Level


 


 3.4 mmol/L


(3.5-5.1)  L


 


Chloride Level


 


 100 mmol/L


()


 


Carbon Dioxide Level


 


 28 mmol/L


(21-32)


 


Anion Gap  11 (6-14)  


 


Blood Urea Nitrogen


 


 9 mg/dL (7-20)





 


Creatinine


 


 1.1 mg/dL


(0.6-1.0)  H


 


Estimated GFR


(Cockcroft-Gault) 


 50.5  





 


BUN/Creatinine Ratio  8 (6-20)  


 


Glucose Level


 


 100 mg/dL


(70-99)  H


 


Calcium Level


 


 9.7 mg/dL


(8.5-10.1)


 


Total Bilirubin


 


 0.4 mg/dL


(0.2-1.0)


 


Aspartate Amino Transferase


(AST) 


 27 U/L (15-37)





 


Alanine Aminotransferase (ALT)


 


 28 U/L (14-59)





 


Alkaline Phosphatase


 


 92 U/L


()


 


Total Protein


 


 8.6 g/dL


(6.4-8.2)  H


 


Albumin


 


 4.3 g/dL


(3.4-5.0)


 


Albumin/Globulin Ratio  1.0 (1.0-1.7)  





                                Laboratory Tests


9/2/19 17:40








                                Laboratory Tests


9/2/19 17:40








 (DIMAS PAUL DO)


Lab Values





Microbiology


9/2/19 Urine Culture - Final, Complete


         


9/2/19 Urine Culture Result 1 (ELENA) - Final, Complete


         





 (MEGHANA TOMAS MD)





EKG


EKG


[]


 (MEGHANA TOMAS MD)





Radiology/Procedures


Radiology/Procedures


[]


 (MEGHANA TOMAS MD)


Radiology/Procedures


PROCEDURE: CT ABDOMEN PELVIS WO CONTRAST





CT ABDOMEN PELVIS WO CONTRAST


 


Indication: Left flank pain.


 


Exposure: One or more of the following individualized dose reduction 


techniques were utilized for this examination:  1. Automated exposure 


control  2. Adjustment of the mA and/or kV according to patient size  3. 


Use of iterative reconstruction technique.


 


Comparison: March 7, 2018


Technique:  No intravenous contrast given. No oral contrast per request.


 


Findings:


Evaluation of solid viscera, bowel and vasculature is compromised by the 


noncontrast technique.


 


Lung bases are clear. Right lobe liver is elongated at about 22 cm. No 


evidence of obvious lesion on noncontrast exam. Spleen unremarkable. 


Pancreas unremarkable with mild calcifications which could indicate 


chronic pancreatitis. No evidence of adrenal mass.


 


Calculus in the lower pole the right kidney is nonobstructive, measures 7 


mm. Tiny lower pole left renal calculus is nonobstructive. No evidence of 


hydronephrosis. No evidence of left ureteric calculus or dilatation. The 


right ureter is difficult to follow in the pelvis.


 


Subtle low-density lesion at the upper pole of the left kidney measures 


about 1 cm probably a cyst, measures 2 Hounsfield units. Mild stranding in


the perinephric fat bilaterally. This can be seen with chronic medical 


renal disease or acute inflammatory process.


 


No calcified gallstone. Aorta is mildly calcified and ectatic, no gross 


aneurysm. No significant lymph node enlargement.


 


No significant small bowel distention. Mild retained stool in the colon. 


No evidence of acute colitis. The appendix appears normal.


 


No evidence of ascites. No significant pneumoperitoneum. Urinary bladder 


is unremarkable. No evidence of pelvic mass.


 


Degenerative spondylosis, greatest at the lumbosacral junction. Appearance


and alignment is similar. Cement again identified within L1 vertebral body


compression fracture.


 


IMPRESSION:


1. Small nonobstructive renal calculi. No evidence of ureteric calculus or


urinary tract obstruction.


2. Small left renal lesion is probably a cyst.


3. Mild elongation right liver.


 


Electronically signed by: Dimas Hallman MD (9/2/2019 6:27 PM) [facility]


 (DIMAS PAUL DO)





Course & Med Decision Making


Course & Med Decision Making


Pertinent Labs and Imaging studies reviewed. (See chart for details)





[]Care to Paul at 1800, KIDNEY STONE WORKUP IN PROGRESS. 


 (MEGHANA TOMAS MD)


Course & Med Decision Making


Sign out received from Dr. Tomas for patient with left flank/back pain.  Labs

 reviewed.  UA without signs of infection.  Pain previously addressed.  CT 

imaging pending at time of sign out.  CT without acute process.  Patient seen 

and evaluated by myself.  NO rash noted.  Appears more paraspinal in nature.  

Patient c/o continued pain.  Additional fentanyl, PO dexamethasone, and Lidoderm

 patch provided.   No controlled prescriptions provided due to [facility] report of 

120 tabs of Oxycodone 30mg filled on 8/19/19.  





Patient stable for discharge with outpatient follow-up with PCP/Pain management.

 Discussed findings and plan with patient and family, who acknowledge 

understanding and agreement.


 (DIMAS PAUL DO)


Dragon Disclaimer


Dragon Disclaimer


This electronic medical record was generated, in whole or in part, using a voice

 recognition dictation system.


 (MEGHANA TOMAS MD)





Departure


Departure


Impression:  


   Primary Impression:  


   Flank pain


Disposition:  01 HOME, SELF-CARE


Condition:  STABLE


Referrals:  


ANDRES MICHELE MD (PCP)


Patient Instructions:  Flank Pain, Easy-to-Read





Additional Instructions:  


It is unclear why your back/flank is hurting today.  Please continue previously 

prescribed pain medication for your chronic back pain and follow closely with [facility] doctors for further management and evaluation.


Scripts


Lidocaine (Lidocaine PATCH  **) 1 Each Adh..patch


1 EACH TP DAILY for FOR LOCAL PAIN, #10 PATCH


   REMOVE AFTER 12 HOURS


   Prov: DIMAS PAUL DO         9/2/19











MEGHANA TOMAS MD             Sep 2, 2019 17:43


DIMAS PAUL DO              Sep 2, 2019 19:12

## 2019-09-02 NOTE — RAD
CT ABDOMEN PELVIS WO CONTRAST

 

Indication: Left flank pain.

 

Exposure: One or more of the following individualized dose reduction 

techniques were utilized for this examination:  1. Automated exposure 

control  2. Adjustment of the mA and/or kV according to patient size  3. 

Use of iterative reconstruction technique.

 

Comparison: March 7, 2018

Technique:  No intravenous contrast given. No oral contrast per request.

 

Findings:

Evaluation of solid viscera, bowel and vasculature is compromised by the 

noncontrast technique.

 

Lung bases are clear. Right lobe liver is elongated at about 22 cm. No 

evidence of obvious lesion on noncontrast exam. Spleen unremarkable. 

Pancreas unremarkable with mild calcifications which could indicate 

chronic pancreatitis. No evidence of adrenal mass.

 

Calculus in the lower pole the right kidney is nonobstructive, measures 7 

mm. Tiny lower pole left renal calculus is nonobstructive. No evidence of 

hydronephrosis. No evidence of left ureteric calculus or dilatation. The 

right ureter is difficult to follow in the pelvis.

 

Subtle low-density lesion at the upper pole of the left kidney measures 

about 1 cm probably a cyst, measures 2 Hounsfield units. Mild stranding in

the perinephric fat bilaterally. This can be seen with chronic medical 

renal disease or acute inflammatory process.

 

No calcified gallstone. Aorta is mildly calcified and ectatic, no gross 

aneurysm. No significant lymph node enlargement.

 

No significant small bowel distention. Mild retained stool in the colon. 

No evidence of acute colitis. The appendix appears normal.

 

No evidence of ascites. No significant pneumoperitoneum. Urinary bladder 

is unremarkable. No evidence of pelvic mass.

 

Degenerative spondylosis, greatest at the lumbosacral junction. Appearance

and alignment is similar. Cement again identified within L1 vertebral body

compression fracture.

 

IMPRESSION:

1. Small nonobstructive renal calculi. No evidence of ureteric calculus or

urinary tract obstruction.

2. Small left renal lesion is probably a cyst.

3. Mild elongation right liver.

 

Electronically signed by: Dimas Hallman MD (9/2/2019 6:27 PM) [facility]

## 2019-10-28 ENCOUNTER — HOSPITAL ENCOUNTER (EMERGENCY)
Dept: HOSPITAL 61 - ER | Age: 59
Discharge: HOME | End: 2019-10-28
Payer: SELF-PAY

## 2019-10-28 VITALS — SYSTOLIC BLOOD PRESSURE: 173 MMHG | DIASTOLIC BLOOD PRESSURE: 87 MMHG

## 2019-10-28 VITALS — WEIGHT: 180 LBS | HEIGHT: 68 IN | BODY MASS INDEX: 27.28 KG/M2

## 2019-10-28 DIAGNOSIS — Z85.41: ICD-10-CM

## 2019-10-28 DIAGNOSIS — Z71.6: ICD-10-CM

## 2019-10-28 DIAGNOSIS — G89.29: ICD-10-CM

## 2019-10-28 DIAGNOSIS — I10: ICD-10-CM

## 2019-10-28 DIAGNOSIS — N30.00: Primary | ICD-10-CM

## 2019-10-28 LAB
APTT PPP: YELLOW S
BACTERIA #/AREA URNS HPF: (no result) /HPF
BILIRUB UR QL STRIP: NEGATIVE
FIBRINOGEN PPP-MCNC: CLEAR MG/DL
NITRITE UR QL STRIP: NEGATIVE
PH UR STRIP: 7 [PH]
PROT UR STRIP-MCNC: NEGATIVE MG/DL
RBC #/AREA URNS HPF: 0 /HPF (ref 0–2)
SQUAMOUS #/AREA URNS LPF: (no result) /LPF
UROBILINOGEN UR-MCNC: 0.2 MG/DL
WBC #/AREA URNS HPF: (no result) /HPF (ref 0–4)

## 2019-10-28 PROCEDURE — 74176 CT ABD & PELVIS W/O CONTRAST: CPT

## 2019-10-28 PROCEDURE — 81001 URINALYSIS AUTO W/SCOPE: CPT

## 2019-10-28 PROCEDURE — 87086 URINE CULTURE/COLONY COUNT: CPT

## 2019-10-28 PROCEDURE — 99285 EMERGENCY DEPT VISIT HI MDM: CPT

## 2019-10-28 PROCEDURE — 96372 THER/PROPH/DIAG INJ SC/IM: CPT

## 2019-10-28 NOTE — PHYS DOC
Past Medical History


Past Medical History:  Cancer, Hypertension, Kidney Stone


Additional Past Medical Histor:  chronic pain, CERVICAL CANCER


Past Surgical History:  Cancer Surgery, Tubal ligation


Additional Past Surgical Histo:  Ureteral Stent, Laparoscopy, CANCER SX


Alcohol Use:  None


Drug Use:  None





Adult General


Chief Complaint


Chief Complaint:  BACK PAIN OR INJURY





[facility]


HPI





Patient is a 59  year old female with history of chronic pain and frequent 

emergency room visits who presents with complaining of left flank pain. Patient 

complaining of sudden onset of left flank pain since last night as a constant 

pain with radiation to suprapubic area. Patient rated her pain 10 over 10 and 

states it's gradually getting worse. Patient denies nausea and vomiting, fever 

and chills, urinary symptom, injury. Patient states she had the same episode of 

pain frequently and sometimes she had kidney stone with the same pain. Patient 

states she took Methocarbamol and 60 mg of oxycodone last night but didn't take 

any pain medication this morning because she plan to come to the emergency room.





Review of Systems


Review of Systems





Constitutional: Denies fever or chills []


Eyes: Denies change in visual acuity, redness, or eye pain []


HENT: Denies nasal congestion or sore throat []


Respiratory: Denies cough or shortness of breath []


Cardiovascular: No additional information not addressed in HPI []


GI: Denies abdominal pain, nausea, vomiting, bloody stools or diarrhea []


: Denies dysuria or hematuria []


Musculoskeletal: Denies back pain or joint pain []


Integument: Denies rash or skin lesions []


Neurologic: Denies headache, focal weakness or sensory changes []


Endocrine: Denies polyuria or polydipsia []





All other systems were reviewed and found to be within normal limits, except as 

documented in this note.





Current Medications


Current Medications





Current Medications








 Medications


  (Trade)  Dose


 Ordered  Sig/Kimberli  Start Time


 Stop Time Status Last Admin


Dose Admin


 


 Ceftriaxone Sodium


  (Rocephin Im)  1 gm  1X  ONCE  10/28/19 12:15


 10/28/19 12:16 DC 10/28/19 12:20


1 GM


 


 Cyclobenzaprine


 HCl


  (Flexeril)  10 mg  1X  ONCE  10/28/19 11:00


 10/28/19 11:01 DC 10/28/19 11:01


10 MG


 


 Fentanyl Citrate


  (Fentanyl 2ml


 Vial)  50 mcg  1X  ONCE  10/28/19 12:15


 10/28/19 12:16 DC 10/28/19 12:20


50 MCG











Allergies


Allergies





Allergies








Coded Allergies Type Severity Reaction Last Updated Verified


 


  No Known Drug Allergies    10/8/16 No











Physical Exam


Physical Exam








Constitutional: Well developed, well nourished, mild distress, non-toxic 

appearance. []


HENT: Normocephalic, atraumatic.


Eyes: PERRLA, EOMI, conjunctiva normal, no discharge. [] 


Neck: Normal range of motion, no tenderness, supple, no stridor. [] 


Cardiovascular:Heart rate regular rhythm, no murmur []


Lungs & Thorax:  Bilateral breath sounds clear to auscultation []


Abdomen: Bowel sounds normal, soft, no tenderness, no masses, no pulsatile 

masses. [] 


Skin: Warm, dry, no erythema, no rash. [] 


Back: No tenderness, no CVA tenderness. [] 


Extremities: No tenderness, no cyanosis, no clubbing, ROM intact, no edema. [] 


Neurologic: Alert and oriented X 3, no focal deficits noted. []


Psychologic: Affect anxious, judgement normal, mood normal. []





Current Patient Data


Vital Signs





                                   Vital Signs








  Date Time  Temp Pulse Resp B/P (MAP) Pulse Ox O2 Delivery O2 Flow Rate FiO2


 


10/28/19 12:20   20     


 


10/28/19 11:54  76  173/87 (115) 98 Room Air  


 


10/28/19 10:28 98.0       





 98.0       








Lab Values





                                Laboratory Tests








Test


 10/28/19


10:30


 


Urine Collection Type Unknown  


 


Urine Color Yellow  


 


Urine Clarity Clear  


 


Urine pH 7.0  


 


Urine Specific Gravity 1.015  


 


Urine Protein


 Negative mg/dL


(NEG-TRACE)


 


Urine Glucose (UA)


 Negative mg/dL


(NEG)


 


Urine Ketones (Stick)


 Negative mg/dL


(NEG)


 


Urine Blood


 Negative (NEG)





 


Urine Nitrite


 Negative (NEG)





 


Urine Bilirubin


 Negative (NEG)





 


Urine Urobilinogen Dipstick


 0.2 mg/dL (0.2


mg/dL)


 


Urine Leukocyte Esterase Large (NEG)  


 


Urine RBC 0 /HPF (0-2)  


 


Urine WBC


 20-40 /HPF


(0-4)


 


Urine Squamous Epithelial


Cells Few /LPF  





 


Urine Transitional Epithelial


Cells Occ /LPF  





 


Urine Bacteria


 Moderate /HPF


(0-FEW)











EKG


EKG


[]





Radiology/Procedures


Radiology/Procedures


[][facility]


[address] [place] [address]


[phone]





IMAGING REPORT





Signed





PATIENT: PAMELA DU   ACCOUNT: [id number]   MRN#: [id number]


: 1960   LOCATION: ER   AGE: 59


SEX: F   EXAM DT: 10/28/19   ACCESSION#: [id number]


STATUS: REG ER   ORD. PHYSICIAN: KELLE HATFIELD MD   


REASON: left flank pain


PROCEDURE: CT ABDOMEN PELVIS WO CONTRAST





CT ABDOMEN PELVIS WO CONTRAST


 


History: Left flank pain.


 


Technique: Noncontrast examination of the abdomen and pelvis. Coronal and 


sagittal reconstructions were performed.


 


Exposure: One or more of the following individualized dose reduction 


techniques were utilized for this examination:  


1. Automated exposure control  


2. Adjustment of the mA and/or kV according to patient size  


3. Use of iterative reconstruction technique.


 


Comparison:  2019


 


Findings:


Lower chest: Left lower lobe and lingular groundglass opacities


 


Abdomen and pelvis: The liver, spleen, gallbladder, adrenal glands and 


pancreas have unremarkable noncontrast appearance.


 


Small nonobstructing right renal calculus measures 6 mm. Punctate 


nonobstructing left renal calculus measures 3 mm. No hydronephrosis. No 


ureteral or urinary bladder stone. Bilateral small renal hypodensities, 


not well characterized on noncontrast examination, overall unchanged 


compared to prior.


 


Normal appendix. No evidence of bowel obstruction. Anterior abdominal wall


mesh hernia repair. Atheromatous calcination throughout the nonaneurysmal 


abdominal aorta and branch vessels.


 


Bones: Chronic L1 compression fracture with vertebroplasty material. 


Advanced L5-S1 DJD.


 


Impression:


1.  No obstructing urolithiasis.


2.  Nonobstructing bilateral renal calculi.


3.  Left lower lobe and lingular groundglass opacities, may represent 


infectious or inflammatory process.


4.  Bilateral small renal hypodensities, similar compared to prior.


 


Electronically signed by: Kelby Dave DO (10/28/2019 11:50 AM) [facility]














DICTATED and SIGNED BY:     KELBY DAVE DO


DATE:     10/28/19 0340





Course & Med Decision Making


Course & Med Decision Making


Pertinent Labs and Imaging studies reviewed. (See chart for details)





Evaluation of patient in ER showed 59-year-old female patient with complaining 

of left flank pain since last night that getting worse with movement. Patient 

did not have CVA tenderness. Labs showed UTI. CT of abdomen and pelvis did not 

show kidney stone. Patient was advised to follow up with her primary care 

physician regarding abnormal results of CT for chest and quit smoking. Patient 

treated with fentanyl 50 g IM 2 and Flexeril and Rocephin IM with improvement 

of her condition. Patient advised to continue home pain medication as oxycodone 

30 mg.





I've spoken with the patient and/or caregivers. I've explained the patient's 

condition, diagnosis and treatment plan based on information available to me at 

this time. I've answered the patient's and/or caregivers questions and addressed

 any concerns. The patient and/or caregivers have a good understanding the 

patient's diagnosis, condition and treatment plan as can be expected at this 

point. Vital signs have been stabilized. The patient's condition is stable for 

discharge from the emergency department.





The patient will pursue further outpatient evaluation with her primary care 

provider or other designated consulting physician as outlined in the discharge 

instructions. Patient and/or caregivers are agreeable to this plan of care and 

follow-up instructions have been explained in detail. The patient and/or 

caregivers have received these instructions in written format and expressed 

understanding of these discharge instructions. The patient and her caregivers 

are aware that if any significant change in condition or worsening of symptoms 

should prompt him to immediately return to this of the closest emergency 

department.  If an emergent department is not readily available I would 

encourage him to call 911.





Lorenzo Disclaimer


Dragon Disclaimer


This electronic medical record was generated, in whole or in part, using a voice

 recognition dictation system.





Departure


Departure


Impression:  


   Primary Impression:  


   UTI (urinary tract infection)


   Additional Impressions:  


   Flank pain


   Tobacco abuse


   Tobacco abuse counseling


Disposition:   HOME, SELF-CARE (at 12:30)


Condition:  IMPROVED


Referrals:  


ANDRES MICHELE MD (PCP)


Patient Instructions:  Flank Pain, Smoking Cessation, Tips For Success, Urinary 

Tract Infection





Additional Instructions:  


Drink plenty of liquids


Follow-up with your primary care physician in 2-3 days


Return to ER if not getting better


Scripts


Phenazopyridine Hcl (PYRIDIUM) 100 Mg Tablet


100 MG PO TID for dysuria, #10 TAB


   Prov: KELLE HATFIELD MD         10/28/19 


Ciprofloxacin Hcl (CIPRO) 250 Mg Tablet


1 TAB PO BID for infection, #14 TAB


   Prov: KELLE HATFIELD MD         10/28/19





Problem Qualifiers








   Primary Impression:  


   UTI (urinary tract infection)


   Urinary tract infection type:  acute cystitis  Hematuria presence:  without 

   hematuria  Qualified Codes:  N30.00 - Acute cystitis without hematuria








KELLE HATFIELD MD             Oct 28, 2019 11:00

## 2019-10-28 NOTE — RAD
CT ABDOMEN PELVIS WO CONTRAST

 

History: Left flank pain.

 

Technique: Noncontrast examination of the abdomen and pelvis. Coronal and 

sagittal reconstructions were performed.

 

Exposure: One or more of the following individualized dose reduction 

techniques were utilized for this examination:  

1. Automated exposure control  

2. Adjustment of the mA and/or kV according to patient size  

3. Use of iterative reconstruction technique.

 

Comparison:  September 2, 2019

 

Findings:

Lower chest: Left lower lobe and lingular groundglass opacities

 

Abdomen and pelvis: The liver, spleen, gallbladder, adrenal glands and 

pancreas have unremarkable noncontrast appearance.

 

Small nonobstructing right renal calculus measures 6 mm. Punctate 

nonobstructing left renal calculus measures 3 mm. No hydronephrosis. No 

ureteral or urinary bladder stone. Bilateral small renal hypodensities, 

not well characterized on noncontrast examination, overall unchanged 

compared to prior.

 

Normal appendix. No evidence of bowel obstruction. Anterior abdominal wall

mesh hernia repair. Atheromatous calcination throughout the nonaneurysmal 

abdominal aorta and branch vessels.

 

Bones: Chronic L1 compression fracture with vertebroplasty material. 

Advanced L5-S1 DJD.

 

Impression:

1.  No obstructing urolithiasis.

2.  Nonobstructing bilateral renal calculi.

3.  Left lower lobe and lingular groundglass opacities, may represent 

infectious or inflammatory process.

4.  Bilateral small renal hypodensities, similar compared to prior.

 

Electronically signed by: Kelby Dave DO (10/28/2019 11:50 AM) [facility]

## 2020-12-12 ENCOUNTER — HOSPITAL ENCOUNTER (EMERGENCY)
Dept: HOSPITAL 61 - ER | Age: 60
End: 2020-12-12
Payer: SELF-PAY

## 2020-12-12 VITALS — HEIGHT: 68 IN | WEIGHT: 180.34 LBS | BODY MASS INDEX: 27.33 KG/M2

## 2020-12-12 VITALS — DIASTOLIC BLOOD PRESSURE: 90 MMHG | SYSTOLIC BLOOD PRESSURE: 183 MMHG

## 2020-12-12 DIAGNOSIS — Z98.890: ICD-10-CM

## 2020-12-12 DIAGNOSIS — Z85.9: ICD-10-CM

## 2020-12-12 DIAGNOSIS — Z98.51: ICD-10-CM

## 2020-12-12 DIAGNOSIS — M79.651: Primary | ICD-10-CM

## 2020-12-12 DIAGNOSIS — F17.200: ICD-10-CM

## 2020-12-12 DIAGNOSIS — G89.29: ICD-10-CM

## 2020-12-12 DIAGNOSIS — Z87.442: ICD-10-CM

## 2020-12-12 DIAGNOSIS — I10: ICD-10-CM

## 2020-12-12 LAB
ANION GAP SERPL CALC-SCNC: 11 MMOL/L (ref 6–14)
BUN SERPL-MCNC: 7 MG/DL (ref 7–20)
CALCIUM SERPL-MCNC: 9.4 MG/DL (ref 8.5–10.1)
CHLORIDE SERPL-SCNC: 100 MMOL/L (ref 98–107)
CO2 SERPL-SCNC: 28 MMOL/L (ref 21–32)
CREAT SERPL-MCNC: 1.2 MG/DL (ref 0.6–1)
GFR SERPLBLD BASED ON 1.73 SQ M-ARVRAT: 45.5 ML/MIN
GLUCOSE SERPL-MCNC: 114 MG/DL (ref 70–99)
POTASSIUM SERPL-SCNC: 3.4 MMOL/L (ref 3.5–5.1)
SODIUM SERPL-SCNC: 139 MMOL/L (ref 136–145)

## 2020-12-12 PROCEDURE — 80048 BASIC METABOLIC PNL TOTAL CA: CPT

## 2020-12-12 PROCEDURE — 36415 COLL VENOUS BLD VENIPUNCTURE: CPT

## 2020-12-12 PROCEDURE — 99284 EMERGENCY DEPT VISIT MOD MDM: CPT

## 2020-12-12 PROCEDURE — 96372 THER/PROPH/DIAG INJ SC/IM: CPT

## 2020-12-12 PROCEDURE — 93971 EXTREMITY STUDY: CPT

## 2020-12-12 NOTE — RAD
US DPLX VENOUS EXTREMITY LOWER RT



History: Reason: pain in upper thigh / Spl. Instructions:  / History: 



Comparison:  None.



Discussion: 



Multiple longitudinal and transverse high resolution real-time images of the venous system of right l
ower extremity were obtained with color and Doppler sampling. The common femoral, superficial femoral
, popliteal and proximal calf veins are all patent and demonstrate normal flow and compressibility. N
ormal respiratory phasicity and augmentation is present.  



Impression: 

1.  No evidence of deep vein thrombosis.



Electronically signed by: Kelby Dave DO (12/12/2020 5:47 AM) [facility]

## 2020-12-12 NOTE — ED.ADGEN
Past Medical History


Past Medical History:  Cancer, Hypertension, Kidney Stone


Additional Past Medical Histor:  chronic pain, CERVICAL CANCER


Past Surgical History:  Cancer Surgery, Tubal ligation


Additional Past Surgical Histo:  Ureteral Stent, Laparoscopy, CANCER SX


Smoking Status:  Current Every Day Smoker


Alcohol Use:  None


Drug Use:  None





General Adult


EDM:


Chief Complaint:  LOWER EXT PAIN





HPI:


HPI:


Patient is a 60-year-old female who presents to the emergency room complaining 

of right inner thigh pain.  Patient states that this started 3 days ago and has 

been progressively getting worse.  She states she has been taking her oxycodone 

at home and it does not even touch the pain.  She denies any trauma.  She denies

swelling or skin changes around the leg.  She states that it radiates into her 

knee and hip.  She is never had anything like this previously.  She has not had 

any other symptoms.  She is walking without difficulty.





Review of Systems:


Review of Systems:


Complete ROS is negative unless otherwise documented in HPI





Current Medications:





Current Medications








 Medications


  (Trade)  Dose


 Ordered  Sig/Kimberli  Start Time


 Stop Time Status Last Admin


Dose Admin


 


 Ketorolac


 Tromethamine


  (Toradol Im)  60 mg  1X  ONCE  12/12/20 05:30


 12/12/20 05:32 DC 12/12/20 05:52


60 MG


 


 Orphenadrine


 Citrate


  (Norflex)  60 mg  1X  ONCE  12/12/20 05:30


 12/12/20 05:32 DC 12/12/20 05:49


60 MG











Allergies:


Allergies:





Allergies








Coded Allergies Type Severity Reaction Last Updated Verified


 


  No Known Drug Allergies    10/8/16 No











Physical Exam:


PE:


General: Awake, alert, NAD. Well Nourished, well hydrated.  Actively walking 

around the room without difficulty


HEENT: Atraumatic, EOMI, PERRL, airway patent, moist oral mucosa


Neck: Supple, trachea midline


Respiratory: CTA bilaterally, normal effort, no wheezing/crackles


CV: RRR, no murmur, cap refill <2


GI: Soft, nondistended, nontender, no masses


MSK: Normal range of motion and bilateral lower extremities with no swelling or 

skin changes, intact sensation, 2+ DP pulses bilaterally, normal capillary 

refill


Skin: Warm, dry, intact


Neuro: A&O x3, speech NL, sensory and motor grossly intact, no focal deficits


Psych: Normal affect, normal mood, not suicidal or homicidal





Current Patient Data:


Labs:





                                Laboratory Tests








Test


 12/12/20


06:13


 


Sodium Level


 139 mmol/L


(136-145)


 


Potassium Level


 3.4 mmol/L


(3.5-5.1)  L


 


Chloride Level


 100 mmol/L


()


 


Carbon Dioxide Level


 28 mmol/L


(21-32)


 


Anion Gap 11 (6-14)  


 


Blood Urea Nitrogen


 7 mg/dL (7-20)





 


Creatinine


 1.2 mg/dL


(0.6-1.0)  H


 


Estimated GFR


(Cockcroft-Gault) 45.5  





 


Glucose Level


 114 mg/dL


(70-99)  H


 


Calcium Level


 9.4 mg/dL


(8.5-10.1)





                                Laboratory Tests


12/12/20 06:13








Vital Signs:





                                   Vital Signs








  Date Time  Temp Pulse Resp B/P (MAP) Pulse Ox O2 Delivery O2 Flow Rate FiO2


 


12/12/20 06:20  86 22 183/90 (121) 98 Room Air  


 


12/12/20 04:50 97.6       





 97.6       











EKG:


EKG:


[]





Heart Score:


Risk Factors:


Risk Factors:  DM, Current or recent (<one month) smoker, HTN, HLP, family 

history of CAD, obesity.


Risk Scores:


Score 0 - 3:  2.5% MACE over next 6 weeks - Discharge Home


Score 4 - 6:  20.3% MACE over next 6 weeks - Admit for Clinical Observation


Score 7 - 10:  72.7% MACE over next 6 weeks - Early Invasive Strategies





Radiology/Procedures:


Radiology/Procedures:


[]





Course & Med Decision Making:


Course & Med Decision Making


Pertinent Labs and Imaging studies reviewed. (See chart for details)





Patient is a 60-year-old female who presents to the emergency room complaining 

of excruciating upper thigh pain that is nontraumatic in nature.  Exam is 

reassuring.  She does not have any significant swelling.  She is good bilateral 

pulses.  She is walking without any difficulty.  She did request pain medication

 several times while here in the emergency room.  Given the area of her pain the

 only emergent condition could be a DVT and an ultrasound was ordered.  Patient 

has bilateral pulses making a arterial clot highly unlikely.  She was given 

Norflex and Toradol. Patient initially refused the norflex and toradol stating 

that it would not help and she needed something for actual pain. After 

discussion that this was the most appropriate thing for her pain and her only 

options patient took the medication.  US is normal at this time. BMP does not 

show any significant electrolyte abnormalities that would be causing severe 

cramping. I have discussed the results with the patient and patient is very 

upset. She wants IV narcotic pain medication to address her pain. I have 

discussed with her that that is not indicated at this time.  Patient's test 

results and vitals while in the ED were fully reviewed and discussed with the 

patient. Patient is stable and at this time does not need admission to the 

hospital. We have discussed strict return precautions and the importance of 

following up with their Primary Care Physician. Patient stated understanding and

 was given an opportunity to ask any questions.





Dragon Disclaimer:


Dragon Disclaimer:


This electronic medical record was generated, in whole or in part, using a voice

 recognition dictation system.





Departure


Departure


Impression:  


   Primary Impression:  


   Leg pain


Disposition:  01 DC HOME SELF CARE/HOMELESS


Condition:  STABLE


Referrals:  


ANDRES MICHELE MD (PCP)


Patient Instructions:  Hip Pain











FIDEL SIERRA MD              Dec 12, 2020 05:45


HEATHER PAUL DO             Dec 13, 2020 10:36

## 2021-01-19 ENCOUNTER — HOSPITAL ENCOUNTER (EMERGENCY)
Dept: HOSPITAL 61 - ER | Age: 61
Discharge: LEFT BEFORE DISCHARGE | End: 2021-01-19
Payer: SELF-PAY

## 2021-01-19 VITALS — BODY MASS INDEX: 27.06 KG/M2 | WEIGHT: 178.57 LBS | HEIGHT: 68 IN

## 2021-01-19 VITALS — SYSTOLIC BLOOD PRESSURE: 152 MMHG | DIASTOLIC BLOOD PRESSURE: 72 MMHG

## 2021-01-19 DIAGNOSIS — Z98.51: ICD-10-CM

## 2021-01-19 DIAGNOSIS — R07.89: Primary | ICD-10-CM

## 2021-01-19 DIAGNOSIS — F17.200: ICD-10-CM

## 2021-01-19 DIAGNOSIS — Z96.0: ICD-10-CM

## 2021-01-19 DIAGNOSIS — R10.9: ICD-10-CM

## 2021-01-19 DIAGNOSIS — Z87.442: ICD-10-CM

## 2021-01-19 DIAGNOSIS — I10: ICD-10-CM

## 2021-01-19 DIAGNOSIS — R19.7: ICD-10-CM

## 2021-01-19 DIAGNOSIS — M54.9: ICD-10-CM

## 2021-01-19 DIAGNOSIS — G89.29: ICD-10-CM

## 2021-01-19 LAB
ALBUMIN SERPL-MCNC: 3.7 G/DL (ref 3.4–5)
ALBUMIN/GLOB SERPL: 0.9 {RATIO} (ref 1–1.7)
ALP SERPL-CCNC: 252 U/L (ref 46–116)
ALT SERPL-CCNC: 378 U/L (ref 14–59)
ANION GAP SERPL CALC-SCNC: 10 MMOL/L (ref 6–14)
APTT PPP: (no result) S
AST SERPL-CCNC: 241 U/L (ref 15–37)
BACTERIA #/AREA URNS HPF: (no result) /HPF
BASOPHILS # BLD AUTO: 0.1 X10^3/UL (ref 0–0.2)
BASOPHILS NFR BLD: 1 % (ref 0–3)
BILIRUB SERPL-MCNC: 0.6 MG/DL (ref 0.2–1)
BILIRUB UR QL STRIP: (no result)
BUN SERPL-MCNC: 9 MG/DL (ref 7–20)
BUN/CREAT SERPL: 8 (ref 6–20)
CALCIUM SERPL-MCNC: 9.1 MG/DL (ref 8.5–10.1)
CHLORIDE SERPL-SCNC: 102 MMOL/L (ref 98–107)
CK SERPL-CCNC: 98 U/L (ref 26–192)
CO2 SERPL-SCNC: 27 MMOL/L (ref 21–32)
CREAT SERPL-MCNC: 1.1 MG/DL (ref 0.6–1)
CRP SERPL-MCNC: 6.5 MG/L (ref 0–3.3)
EOSINOPHIL NFR BLD: 0.1 X10^3/UL (ref 0–0.7)
EOSINOPHIL NFR BLD: 2 % (ref 0–3)
ERYTHROCYTE [DISTWIDTH] IN BLOOD BY AUTOMATED COUNT: 15.2 % (ref 11.5–14.5)
FIBRINOGEN PPP-MCNC: CLEAR MG/DL
GFR SERPLBLD BASED ON 1.73 SQ M-ARVRAT: 50.3 ML/MIN
GLUCOSE SERPL-MCNC: 114 MG/DL (ref 70–99)
HCT VFR BLD CALC: 41 % (ref 36–47)
HGB BLD-MCNC: 13.9 G/DL (ref 12–15.5)
HYALINE CASTS #/AREA URNS LPF: (no result) /HPF
LDH SERPL L TO P-CCNC: 268 U/L (ref 81–234)
LYMPHOCYTES # BLD: 1.5 X10^3/UL (ref 1–4.8)
LYMPHOCYTES NFR BLD AUTO: 22 % (ref 24–48)
MCH RBC QN AUTO: 34 PG (ref 25–35)
MCHC RBC AUTO-ENTMCNC: 34 G/DL (ref 31–37)
MCV RBC AUTO: 101 FL (ref 79–100)
MONO #: 0.5 X10^3/UL (ref 0–1.1)
MONOCYTES NFR BLD: 7 % (ref 0–9)
NEUT #: 4.6 X10^3/UL (ref 1.8–7.7)
NEUTROPHILS NFR BLD AUTO: 68 % (ref 31–73)
NITRITE UR QL STRIP: POSITIVE
PH UR STRIP: 6 [PH]
PLATELET # BLD AUTO: 366 X10^3/UL (ref 140–400)
POTASSIUM SERPL-SCNC: 4.1 MMOL/L (ref 3.5–5.1)
PROT SERPL-MCNC: 7.9 G/DL (ref 6.4–8.2)
PROT UR STRIP-MCNC: 30 MG/DL
RBC # BLD AUTO: 4.06 X10^6/UL (ref 3.5–5.4)
RBC #/AREA URNS HPF: 0 /HPF (ref 0–2)
SODIUM SERPL-SCNC: 139 MMOL/L (ref 136–145)
UROBILINOGEN UR-MCNC: 0.2 MG/DL
WBC # BLD AUTO: 6.7 X10^3/UL (ref 4–11)
WBC #/AREA URNS HPF: >40 /HPF (ref 0–4)

## 2021-01-19 PROCEDURE — 74177 CT ABD & PELVIS W/CONTRAST: CPT

## 2021-01-19 PROCEDURE — 85379 FIBRIN DEGRADATION QUANT: CPT

## 2021-01-19 PROCEDURE — 83615 LACTATE (LD) (LDH) ENZYME: CPT

## 2021-01-19 PROCEDURE — 83690 ASSAY OF LIPASE: CPT

## 2021-01-19 PROCEDURE — 82550 ASSAY OF CK (CPK): CPT

## 2021-01-19 PROCEDURE — 84484 ASSAY OF TROPONIN QUANT: CPT

## 2021-01-19 PROCEDURE — 85025 COMPLETE CBC W/AUTO DIFF WBC: CPT

## 2021-01-19 PROCEDURE — 71275 CT ANGIOGRAPHY CHEST: CPT

## 2021-01-19 PROCEDURE — 71045 X-RAY EXAM CHEST 1 VIEW: CPT

## 2021-01-19 PROCEDURE — 96374 THER/PROPH/DIAG INJ IV PUSH: CPT

## 2021-01-19 PROCEDURE — 83880 ASSAY OF NATRIURETIC PEPTIDE: CPT

## 2021-01-19 PROCEDURE — 86140 C-REACTIVE PROTEIN: CPT

## 2021-01-19 PROCEDURE — 80053 COMPREHEN METABOLIC PANEL: CPT

## 2021-01-19 PROCEDURE — 81001 URINALYSIS AUTO W/SCOPE: CPT

## 2021-01-19 PROCEDURE — 99285 EMERGENCY DEPT VISIT HI MDM: CPT

## 2021-01-19 PROCEDURE — 93005 ELECTROCARDIOGRAM TRACING: CPT

## 2021-01-19 PROCEDURE — 36415 COLL VENOUS BLD VENIPUNCTURE: CPT

## 2021-01-19 RX ADMIN — NITROGLYCERIN PRN MG: 0.4 TABLET SUBLINGUAL at 11:53

## 2021-01-19 RX ADMIN — NITROGLYCERIN PRN MG: 0.4 TABLET SUBLINGUAL at 11:38

## 2021-01-19 NOTE — RAD
XR CHEST 1V



CLINICAL INDICATIONS: Reason: cough, covid



 Comparison: June 20, 2018.



Findings: No acute lung infiltrate or pleural effusion or pulmonary edema or lung mass or pneumothora
x is seen.  The heart size, pulmonary vasculature, mediastinum and both pj are unremarkable. 



IMPRESSION: No acute radiographic abnormality is seen.



Electronically signed by: Vidal Cali MD (1/19/2021 11:16 AM) [id number]

## 2021-01-19 NOTE — ED.ADGEN
Past Medical History


Past Medical History:  Cancer, Hypertension, Kidney Stone


Additional Past Medical Histor:  chronic pain, CERVICAL CANCER


Past Surgical History:  Cancer Surgery, Tubal ligation


Additional Past Surgical Histo:  Ureteral Stent, Laparoscopy, CANCER SX


Smoking Status:  Current Every Day Smoker


Alcohol Use:  None


Drug Use:  None





General Adult


EDM:


Chief Complaint:  CHEST PAIN





HPI:


HPI:


Patient is a 60-year-old female with past medical history of COPD who presents 

to the emergency room complaining of pain in her chest, abdomen, chronic back pa

in.  Patient states that the pain in her chest is left-sided and just hurts.  

She is unable to characterize the pain.  She states it was intermittent at 5:00 

this morning and has been constant since that time.  She denies any new cough or

shortness of breath.  She has chronic cough and shortness of breath due to her 

COPD.  She denies any kind of fever, chills, URI symptoms.  She states she has 

had a burning in her abdomen for the last 5 days.  She has been drinking Pepto-

Bismol which helps with her abdomen.  She has been having chronic back pain for 

several years and this is unchanged.  She states that the pain is very 

unbearable and that she needs something for pain.  She does not want Toradol as 

she feels that that makes her sick she denies any changes to her chronic back 

pain





Review of Systems:


Review of Systems:


Complete ROS is negative unless otherwise documented in HPI





Current Medications:





Current Medications








 Medications


  (Trade)  Dose


 Ordered  Sig/Kimberli  Start Time


 Stop Time Status Last Admin


Dose Admin


 


 Info


  (CONTRAST GIVEN


 -- Rx MONITORING)  1 each  PRN DAILY  PRN  1/19/21 12:45


 1/21/21 12:44   





 


 Iohexol


  (Omnipaque 240


 Mg/ml)  50 ml  1X  ONCE  1/19/21 12:45


 1/19/21 12:46 DC 1/19/21 12:45


50 ML


 


 Iohexol


  (Omnipaque 350


 Mg/ml)  80 ml  1X  ONCE  1/19/21 12:45


 1/19/21 12:46 DC 1/19/21 12:45


80 ML


 


 Morphine Sulfate


  (Morphine


 Sulfate)  5 mg  1X  ONCE  1/19/21 11:15


 1/19/21 11:16 DC 1/19/21 12:16


5 MG


 


 Multi-Ingredient


 Mouthwash/Gargle


  (Gi Cocktail)  20 ml  1X  ONCE  1/19/21 11:15


 1/19/21 11:16 DC 1/19/21 11:37


20 ML


 


 Nitroglycerin


  (Nitrostat)  0.4 mg  PRN Q5MIN  PRN  1/19/21 11:45


    1/19/21 11:53


0.4 MG











Allergies:


Allergies:





Allergies








Coded Allergies Type Severity Reaction Last Updated Verified


 


  No Known Drug Allergies    10/8/16 No











Physical Exam:


PE:


General: Awake, alert, NAD. Well Nourished, well hydrated. Cooperative


HEENT: Atraumatic, EOMI, PERRL, airway patent, moist oral mucosa


Neck: Supple, trachea midline


Respiratory: CTA bilaterally, normal effort, no wheezing/crackles


CV: RRR, no murmur, cap refill <2


GI: Soft, nondistended, nontender, no masses


MSK: No obvious deformities


Skin: Warm, dry, intact


Neuro: A&O x3, speech NL, sensory and motor grossly intact, no focal deficits


Psych: Normal affect, normal mood, not suicidal or homicidal





Current Patient Data:


Labs:





                                Laboratory Tests








Test


 1/19/21


10:50 1/19/21


12:20


 


White Blood Count


 6.7 x10^3/uL


(4.0-11.0) 





 


Red Blood Count


 4.06 x10^6/uL


(3.50-5.40) 





 


Hemoglobin


 13.9 g/dL


(12.0-15.5) 





 


Hematocrit


 41.0 %


(36.0-47.0) 





 


Mean Corpuscular Volume


 101 fL


()  H 





 


Mean Corpuscular Hemoglobin 34 pg (25-35)   


 


Mean Corpuscular Hemoglobin


Concent 34 g/dL


(31-37) 





 


Red Cell Distribution Width


 15.2 %


(11.5-14.5)  H 





 


Platelet Count


 366 x10^3/uL


(140-400) 





 


Neutrophils (%) (Auto) 68 % (31-73)   


 


Lymphocytes (%) (Auto) 22 % (24-48)  L 


 


Monocytes (%) (Auto) 7 % (0-9)   


 


Eosinophils (%) (Auto) 2 % (0-3)   


 


Basophils (%) (Auto) 1 % (0-3)   


 


Neutrophils # (Auto)


 4.6 x10^3/uL


(1.8-7.7) 





 


Lymphocytes # (Auto)


 1.5 x10^3/uL


(1.0-4.8) 





 


Monocytes # (Auto)


 0.5 x10^3/uL


(0.0-1.1) 





 


Eosinophils # (Auto)


 0.1 x10^3/uL


(0.0-0.7) 





 


Basophils # (Auto)


 0.1 x10^3/uL


(0.0-0.2) 





 


D-Dimer (Bre)


 1.21 ug/mlFEU


(0.00-0.50)  H 





 


Sodium Level


 139 mmol/L


(136-145) 





 


Potassium Level


 4.1 mmol/L


(3.5-5.1) 





 


Chloride Level


 102 mmol/L


() 





 


Carbon Dioxide Level


 27 mmol/L


(21-32) 





 


Anion Gap 10 (6-14)   


 


Blood Urea Nitrogen


 9 mg/dL (7-20)


 





 


Creatinine


 1.1 mg/dL


(0.6-1.0)  H 





 


Estimated GFR


(Cockcroft-Gault) 50.3  


 





 


BUN/Creatinine Ratio 8 (6-20)   


 


Glucose Level


 114 mg/dL


(70-99)  H 





 


Calcium Level


 9.1 mg/dL


(8.5-10.1) 





 


Total Bilirubin


 0.6 mg/dL


(0.2-1.0) 





 


Aspartate Amino Transferase


(AST) 241 U/L


(15-37)  H 





 


Alanine Aminotransferase (ALT)


 378 U/L


(14-59)  H 





 


Alkaline Phosphatase


 252 U/L


()  H 





 


Lactate Dehydrogenase


 268 U/L


()  H 





 


Creatine Kinase


 98 U/L


() 





 


Troponin I Quantitative


 < 0.017 ng/mL


(0.000-0.055) 





 


C-Reactive Protein,


Quantitative 6.5 mg/L


(0-3.3)  H 





 


NT-Pro-B-Type Natriuretic


Peptide 153 pg/mL


(0-124)  H 





 


Total Protein


 7.9 g/dL


(6.4-8.2) 





 


Albumin


 3.7 g/dL


(3.4-5.0) 





 


Albumin/Globulin Ratio


 0.9 (1.0-1.7)


L 





 


Urine Collection Type  Unknown  


 


Urine Color  Marcelina  


 


Urine Clarity  Clear  


 


Urine pH


 


 6.0 (<5.0-8.0)





 


Urine Specific Gravity


 


 1.020


(1.000-1.030)


 


Urine Protein


 


 30 mg/dL


(NEG-TRACE)


 


Urine Glucose (UA)


 


 Negative mg/dL


(NEG)


 


Urine Ketones (Stick)


 


 Negative mg/dL


(NEG)


 


Urine Blood


 


 Negative (NEG)





 


Urine Nitrite


 


 Positive (NEG)





 


Urine Bilirubin  Small (NEG)  


 


Urine Urobilinogen Dipstick


 


 0.2 mg/dL (0.2


mg/dL)


 


Urine Leukocyte Esterase  Large (NEG)  


 


Urine RBC  0 /HPF (0-2)  


 


Urine WBC


 


 >40 /HPF (0-4)





 


Urine Squamous Epithelial


Cells 


 Mod /LPF  





 


Urine Bacteria


 


 Many /HPF


(0-FEW)


 


Urine Hyaline Casts


 


 Occasional


/HPF


 


Urine Mucus  Marked /LPF  





                                Laboratory Tests


1/19/21 10:50








                                Laboratory Tests


1/19/21 10:50











Vital Signs:





                                   Vital Signs








  Date Time  Temp Pulse Resp B/P (MAP) Pulse Ox O2 Delivery O2 Flow Rate FiO2


 


1/19/21 12:16   18  98 Room Air  


 


1/19/21 11:53  62  165/87    


 


1/19/21 10:37 97.6       





 97.6       











EKG:


EKG:


[]





Heart Score:


Risk Factors:


Risk Factors:  DM, Current or recent (<one month) smoker, HTN, HLP, family 

history of CAD, obesity.


Risk Scores:


Score 0 - 3:  2.5% MACE over next 6 weeks - Discharge Home


Score 4 - 6:  20.3% MACE over next 6 weeks - Admit for Clinical Observation


Score 7 - 10:  72.7% MACE over next 6 weeks - Early Invasive Strategies





Radiology/Procedures:


Radiology/Procedures:


[]





Course & Med Decision Making:


Course & Med Decision Making


Pertinent Labs and Imaging studies reviewed. (See chart for details)





Patient 60-year-old female presents to the emergency room with multiple 

complaints including chest pain, abdominal pain, chronic back pain.  Patient's 

pain has been constant since 5:00 this morning.  She will not need a delta tro

ponin.  Cardiac work-up was ordered including CBC, BMP, troponin, EKG, chest x-

ray.  Patient is requesting narcotic pain medicine for pain.  She will be given 

nitro for chest pain and GI cocktail for her abdomen UA was also ordered as 

patient is been having some dysuria. UA does suggest UTI. CT angio chest ordered

 due to elevated ddimer with new onset chest pain. CT abd pelvis ordered to 

evaluate for gallbladder pathology, kidney stone, obstruction, etc. 


Patient has requested to leave AGAINST MEDICAL ADVICE.  She is very upset that 

she has not received multiple doses of narcotic pain medicine.  I have discussed

 the benefits of staying for a full work up and the patient would like to leave.

 I discussed the risks of leaving including but not limited to death, permenant 

end-organ damage, worsening of condition and patient stated understanding. 

Patient signed out against medical advice.  At the time that the patient signed 

out she had completed her CT, however the results were not available as the 

radiologist had not reviewed it.





Dragon Disclaimer:


Dragon Disclaimer:


This electronic medical record was generated, in whole or in part, using a voice

 recognition dictation system.





Departure


Departure


Impression:  


   Primary Impression:  


   Chest pain


   Additional Impressions:  


   Abdominal pain


   Diarrhea


   Chronic back pain


   Left before treatment completed


Disposition:  07 AMA/ELOPED/LWBS


Referrals:  


ANDRES MICHELE MD (PCP)





Problem Qualifiers











FIDEL SIERRA MD              Jan 19, 2021 11:47

## 2021-01-19 NOTE — RAD
CTA scan of the Chest with Contrast (Pulmonary Embolism protocol) 1/19/2021



Clinical History: Chest pain. Elevated d-dimer.



Technique: After the intravenous administration of 80 cc of Omnipaque 350, contiguous, 0.625 mm axial
 sections were obtained through the chest. 2  mm axial and 3D MIP coronal and sagittal reconstructed 
images were obtained. 



One or more of the following individualized dose reduction techniques were utilized for this study:



1. Automated exposure control.

2. Adjustment of the mA and/or kV according to patient size.

3. Use of iterative reconstruction technique.







Findings: Beam hardening artifact related to contrast within the left axillary, subclavian and brachi
ocephalic vein related to the contrast injection limits evaluation of the superior mediastinum. In ov
al-shaped soft tissue mass is seen within the anterior superior mediastinum which is difficult to sandoval
luate. 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



No filling defect is seen within the major branches of either pulmonary artery. There is no CT eviden
ce of pulmonary embolism. The heart is borderline enlarged. Scattered atherosclerotic calcification o
f the thoracic aorta and its branches is noted. The thoracic aorta is tortuous but tapers normally.



Patchy perihilar infiltrates are seen involving both lower lungs no pneumothorax or pleural effusion 
is seen.



Impression: There is no CT evidence of pulmonary embolism.







CT scan of the abdomen and pelvis with contrast 1/19/2021



CLINICAL HISTORY: Abdominal pain.



TECHNIQUE: After the oral and intravenous ministration contrast, contiguous, 5 mm axial sections were
 obtained through the abdomen and pelvis.



One or more of the following individualized dose reduction techniques were utilized for this study:



1. Automated exposure control.

2. Adjustment of the mA and/or kV according to patient size.

3. Use of iterative reconstruction technique.





FINDINGS: Comparison study is dated 10/28/2019.



Images through the lung bases demonstrate minimal dependent subsegmental atelectasis bilaterally.



The liver parenchyma has a decreased attenuation consistent with fatty infiltration. Small calcified 
granulomas are seen scattered throughout the spleen. The pancreas is within normal limits. Rounded lo
w-attenuation lesions are seen involving both kidneys which measure 5 mm to 1 cm in size. They likely
 represent cysts. No further imaging evaluation is recommended. A 7 mm nonobstructing calculus is see
n involving the lower pole the right kidney.



Slight prominence of the body and tail of pancreas is seen. Increased density seen within the fat havrey
rounding this portion of the pancreas consistent with acute pancreatitis. A low-attenuation lesion wi
th a thickened wall is seen in the head of the pancreas which is new since previous examination. This
 measures 7.8 x 6.4 x 5.4 cm in AP, transverse and craniocaudal dimensions. This likely represents pa
ncreatic pseudocyst. Punctate calcifications are seen scattered throughout the pancreas. No additiona
l abnormal fluid collection is seen.



Atherosclerotic calcification abdominal aorta is seen. The abdominal aorta tapers normally. The gallb
ladder is distended. No free fluid or free air is seen within the abdomen. There is no evidence of julia
wel obstruction. Patient is post ventral hernia repair. The appendix is well-visualized and is within
 normal limits.



Images through the pelvis demonstrate the urinary bladder distended with urine. Surgical clips are se
en within the right pelvis. No adnexal mass is seen. No free fluid is noted. Mild S-shaped curvature 
of the thoracolumbar spine is seen. Degenerative changes are seen involving lower thoracic and throug
hout the lumbar spine along with both hips. Patient is post kyphoplasty type procedure involving the 
L1 vertebral body.



IMPRESSION: Findings are seen consistent with acute on chronic pancreatitis. A 7.8 cm pancreatic pseu
docyst is seen within the head of the pancreas.



Electronically signed by: Mark Yoo MD (1/19/2021 2:25 PM) [id number]

## 2021-01-20 ENCOUNTER — HOSPITAL ENCOUNTER (INPATIENT)
Dept: HOSPITAL 61 - ER | Age: 61
LOS: 6 days | Discharge: HOME | DRG: 439 | End: 2021-01-26
Attending: STUDENT IN AN ORGANIZED HEALTH CARE EDUCATION/TRAINING PROGRAM | Admitting: STUDENT IN AN ORGANIZED HEALTH CARE EDUCATION/TRAINING PROGRAM
Payer: MEDICAID

## 2021-01-20 VITALS — BODY MASS INDEX: 28.04 KG/M2 | HEIGHT: 68 IN | WEIGHT: 185 LBS

## 2021-01-20 DIAGNOSIS — N39.0: ICD-10-CM

## 2021-01-20 DIAGNOSIS — G89.29: ICD-10-CM

## 2021-01-20 DIAGNOSIS — F17.210: ICD-10-CM

## 2021-01-20 DIAGNOSIS — Z20.822: ICD-10-CM

## 2021-01-20 DIAGNOSIS — I10: ICD-10-CM

## 2021-01-20 DIAGNOSIS — D75.89: ICD-10-CM

## 2021-01-20 DIAGNOSIS — B96.20: ICD-10-CM

## 2021-01-20 DIAGNOSIS — E03.9: ICD-10-CM

## 2021-01-20 DIAGNOSIS — K82.8: ICD-10-CM

## 2021-01-20 DIAGNOSIS — K86.2: ICD-10-CM

## 2021-01-20 DIAGNOSIS — R19.7: ICD-10-CM

## 2021-01-20 DIAGNOSIS — K85.90: Primary | ICD-10-CM

## 2021-01-20 DIAGNOSIS — Z98.51: ICD-10-CM

## 2021-01-20 DIAGNOSIS — K86.1: ICD-10-CM

## 2021-01-20 DIAGNOSIS — K76.0: ICD-10-CM

## 2021-01-20 DIAGNOSIS — Z85.41: ICD-10-CM

## 2021-01-20 DIAGNOSIS — J44.9: ICD-10-CM

## 2021-01-20 DIAGNOSIS — K86.3: ICD-10-CM

## 2021-01-20 DIAGNOSIS — K81.9: ICD-10-CM

## 2021-01-20 DIAGNOSIS — K73.9: ICD-10-CM

## 2021-01-20 LAB
ALBUMIN SERPL-MCNC: 3.8 G/DL (ref 3.4–5)
ALP SERPL-CCNC: 287 U/L (ref 46–116)
ALT SERPL-CCNC: 450 U/L (ref 14–59)
ANION GAP SERPL CALC-SCNC: 14 MMOL/L (ref 6–14)
AST SERPL-CCNC: 281 U/L (ref 15–37)
BASOPHILS # BLD AUTO: 0 X10^3/UL (ref 0–0.2)
BASOPHILS NFR BLD: 0 % (ref 0–3)
BILIRUB DIRECT SERPL-MCNC: 0.5 MG/DL (ref 0–0.2)
BILIRUB SERPL-MCNC: 0.9 MG/DL (ref 0.2–1)
BUN SERPL-MCNC: 10 MG/DL (ref 7–20)
CALCIUM SERPL-MCNC: 9.6 MG/DL (ref 8.5–10.1)
CHLORIDE SERPL-SCNC: 99 MMOL/L (ref 98–107)
CO2 SERPL-SCNC: 24 MMOL/L (ref 21–32)
CREAT SERPL-MCNC: 1.1 MG/DL (ref 0.6–1)
EOSINOPHIL NFR BLD: 0.1 X10^3/UL (ref 0–0.7)
EOSINOPHIL NFR BLD: 1 % (ref 0–3)
ERYTHROCYTE [DISTWIDTH] IN BLOOD BY AUTOMATED COUNT: 15.5 % (ref 11.5–14.5)
GFR SERPLBLD BASED ON 1.73 SQ M-ARVRAT: 50.3 ML/MIN
GLUCOSE SERPL-MCNC: 97 MG/DL (ref 70–99)
HCT VFR BLD CALC: 38.7 % (ref 36–47)
HGB BLD-MCNC: 13.6 G/DL (ref 12–15.5)
LIPASE: 1910 U/L (ref 73–393)
LYMPHOCYTES # BLD: 2.2 X10^3/UL (ref 1–4.8)
LYMPHOCYTES NFR BLD AUTO: 28 % (ref 24–48)
MCH RBC QN AUTO: 35 PG (ref 25–35)
MCHC RBC AUTO-ENTMCNC: 35 G/DL (ref 31–37)
MCV RBC AUTO: 99 FL (ref 79–100)
MONO #: 0.7 X10^3/UL (ref 0–1.1)
MONOCYTES NFR BLD: 9 % (ref 0–9)
NEUT #: 4.8 X10^3/UL (ref 1.8–7.7)
NEUTROPHILS NFR BLD AUTO: 62 % (ref 31–73)
PLATELET # BLD AUTO: 346 X10^3/UL (ref 140–400)
POTASSIUM SERPL-SCNC: 3.6 MMOL/L (ref 3.5–5.1)
PROT SERPL-MCNC: 8.2 G/DL (ref 6.4–8.2)
RBC # BLD AUTO: 3.91 X10^6/UL (ref 3.5–5.4)
SODIUM SERPL-SCNC: 137 MMOL/L (ref 136–145)
WBC # BLD AUTO: 7.8 X10^3/UL (ref 4–11)

## 2021-01-20 PROCEDURE — 36415 COLL VENOUS BLD VENIPUNCTURE: CPT

## 2021-01-20 PROCEDURE — 87086 URINE CULTURE/COLONY COUNT: CPT

## 2021-01-20 PROCEDURE — 99153 MOD SED SAME PHYS/QHP EA: CPT

## 2021-01-20 PROCEDURE — 88112 CYTOPATH CELL ENHANCE TECH: CPT

## 2021-01-20 PROCEDURE — 99152 MOD SED SAME PHYS/QHP 5/>YRS: CPT

## 2021-01-20 PROCEDURE — 81001 URINALYSIS AUTO W/SCOPE: CPT

## 2021-01-20 PROCEDURE — 96374 THER/PROPH/DIAG INJ IV PUSH: CPT

## 2021-01-20 PROCEDURE — 87186 SC STD MICRODIL/AGAR DIL: CPT

## 2021-01-20 PROCEDURE — 96361 HYDRATE IV INFUSION ADD-ON: CPT

## 2021-01-20 PROCEDURE — 82150 ASSAY OF AMYLASE: CPT

## 2021-01-20 PROCEDURE — 87075 CULTR BACTERIA EXCEPT BLOOD: CPT

## 2021-01-20 PROCEDURE — 86301 IMMUNOASSAY TUMOR CA 19-9: CPT

## 2021-01-20 PROCEDURE — 84443 ASSAY THYROID STIM HORMONE: CPT

## 2021-01-20 PROCEDURE — U0003 INFECTIOUS AGENT DETECTION BY NUCLEIC ACID (DNA OR RNA); SEVERE ACUTE RESPIRATORY SYNDROME CORONAVIRUS 2 (SARS-COV-2) (CORONAVIRUS DISEASE [COVID-19]), AMPLIFIED PROBE TECHNIQUE, MAKING USE OF HIGH THROUGHPUT TECHNOLOGIES AS DESCRIBED BY CMS-2020-01-R: HCPCS

## 2021-01-20 PROCEDURE — 76705 ECHO EXAM OF ABDOMEN: CPT

## 2021-01-20 PROCEDURE — 10009 FNA BX W/CT GDN 1ST LES: CPT

## 2021-01-20 PROCEDURE — 96376 TX/PRO/DX INJ SAME DRUG ADON: CPT

## 2021-01-20 PROCEDURE — 78226 HEPATOBILIARY SYSTEM IMAGING: CPT

## 2021-01-20 PROCEDURE — 80307 DRUG TEST PRSMV CHEM ANLYZR: CPT

## 2021-01-20 PROCEDURE — 84478 ASSAY OF TRIGLYCERIDES: CPT

## 2021-01-20 PROCEDURE — 83690 ASSAY OF LIPASE: CPT

## 2021-01-20 PROCEDURE — 80076 HEPATIC FUNCTION PANEL: CPT

## 2021-01-20 PROCEDURE — A9537 TC99M MEBROFENIN: HCPCS

## 2021-01-20 PROCEDURE — C9113 INJ PANTOPRAZOLE SODIUM, VIA: HCPCS

## 2021-01-20 PROCEDURE — 85610 PROTHROMBIN TIME: CPT

## 2021-01-20 PROCEDURE — 85025 COMPLETE CBC W/AUTO DIFF WBC: CPT

## 2021-01-20 PROCEDURE — 80048 BASIC METABOLIC PNL TOTAL CA: CPT

## 2021-01-20 PROCEDURE — G0378 HOSPITAL OBSERVATION PER HR: HCPCS

## 2021-01-20 PROCEDURE — 74181 MRI ABDOMEN W/O CONTRAST: CPT

## 2021-01-20 PROCEDURE — C1892 INTRO/SHEATH,FIXED,PEEL-AWAY: HCPCS

## 2021-01-20 PROCEDURE — 99406 BEHAV CHNG SMOKING 3-10 MIN: CPT

## 2021-01-20 PROCEDURE — 87426 SARSCOV CORONAVIRUS AG IA: CPT

## 2021-01-20 PROCEDURE — 93005 ELECTROCARDIOGRAM TRACING: CPT

## 2021-01-20 PROCEDURE — 84484 ASSAY OF TROPONIN QUANT: CPT

## 2021-01-20 PROCEDURE — 87071 CULTURE AEROBIC QUANT OTHER: CPT

## 2021-01-20 PROCEDURE — 87077 CULTURE AEROBIC IDENTIFY: CPT

## 2021-01-20 PROCEDURE — 86140 C-REACTIVE PROTEIN: CPT

## 2021-01-20 PROCEDURE — 85730 THROMBOPLASTIN TIME PARTIAL: CPT

## 2021-01-20 PROCEDURE — 88305 TISSUE EXAM BY PATHOLOGIST: CPT

## 2021-01-20 RX ADMIN — ENOXAPARIN SODIUM SCH MG: 40 INJECTION SUBCUTANEOUS at 23:45

## 2021-01-20 NOTE — EKG
[facility]

              [address] [place] [address]

Test Date:    2021               Test Time:    10:24:39

Pat Name:     PAMELA DU            Department:   

Patient ID:   [id number]           Room:          

Gender:       F                        Technician:   

:          [id number]               Requested By: FIDEL SIERRA

Order Number: [id number]           Reading MD:     

                                 Measurements

Intervals                              Axis          

Rate:         59                       P:            17

LA:           170                      QRS:          -24

QRSD:         86                       T:            12

QT:           444                                    

QTc:          444                                    

                           Interpretive Statements

SINUS RHYTHM

LEFTWARD AXIS

T ABNORMALITY IN INFERIOR LEADS

ABNORMAL ECG

RI6.02

No previous ECG available for comparison

## 2021-01-20 NOTE — RAD
Exam: Ultrasound abdomen limited



Indication: Right upper quadrant pain, assess gallbladder



Technique: Real-time grayscale and color Doppler images of the right upper quadrant were obtained by 
the department sonographer.



Comparisons: None



FINDINGS:

Increased echogenicity of the liver, likely related to hepatic steatosis. Vascular flow identified wi
thin the vascular flow.



Gallbladder is distended with internal debris. No pericholecystic fluid or wall thickening. There is 
a sonographic Hodge sign per the department sonographer' s. Common bile duct is dilated measuring 1.
5 cm in diameter.



Right kidney measures 11.6 cm in length. No hydronephrosis.



Visualized portions aorta and IVC are unremarkable.



IMPRESSION:

1.  Dilated gallbladder and common bile duct. An obstructing stone or lesion is not identified. There
 is a positive sonographic Hodge sign per the sonographer, which can be seen in setting of acute cho
lecystitis. If there is persistent concern for obstruction HIDA scan would better evaluate.

2.  Hepatic steatosis.

3.  No right-sided hydronephrosis.



Electronically signed by: Lakshmi Mireles MD (1/20/2021 9:33 PM) [email or web address]

## 2021-01-20 NOTE — PHYS DOC
Past Medical History


Past Medical History:  Cancer, Hypertension, Kidney Stone


Additional Past Medical Histor:  chronic pain, CERVICAL CANCER


Past Surgical History:  Cancer Surgery, Tubal ligation


Additional Past Surgical Histo:  Ureteral Stent, Laparoscopy, CANCER SX


Smoking Status:  Current Every Day Smoker


Alcohol Use:  None


Drug Use:  None





General Adult


EDM:


Chief Complaint:  MULTIPLE COMPLAINTS





HPI:


HPI:


60-year-old female presents the ED with c/o upper abdominal pain, left sided low

back pain with associated cough and shortness of breath.  Pt reports she takes 

dilaudid 2mg q 4-6 hours po, prescribed by her pmd, Dr. Cardona. Asks what pain 

medications she will get in the ed. EMR was reviewed- was seen in the ED 

yesterday and left AMA.  CT abdomen/pelvis consistent with acute on chronic 

pancreatitis with 7.8 cm pancreatic pseudocyst is seen within the head of the 

pancreas. CT also  shows distended gallbladder.  Lipase was in the 1200s.  

Primary care physician was concerned for her gallbladder and sent her to the ED.





Review of Systems:


Review of Systems:


Constitutional:   Denies fever or chills. []


Eyes:   Denies change in visual acuity. []


HENT:   Denies nasal congestion or sore throat. [] 


Respiratory:   Denies productive cough or hemoptysis


Cardiovascular:   Denies syncope or edema. [] 


GI:   Denies  bloody stools or diarrhea. [] 


:  Denies dysuria or hematuria


Musculoskeletal:   Denies back pain or joint pain. [] 


Integument:   Denies rash or diaphoresis


Neurologic:   Denies headache, focal weakness or sensory changes. [] 


Endocrine:   Denies polyuria or polydipsia. [] 


Lymphatic:  Denies swollen glands. [] 


Psychiatric:  Denies depression or anxiety. []





Heart Score:


Risk Factors:


Risk Factors:  DM, Current or recent (<one month) smoker, HTN, HLP, family 

history of CAD, obesity.


Risk Scores:


Score 0 - 3:  2.5% MACE over next 6 weeks - Discharge Home


Score 4 - 6:  20.3% MACE over next 6 weeks - Admit for Clinical Observation


Score 7 - 10:  72.7% MACE over next 6 weeks - Early Invasive Strategies





Allergies:


Allergies:





Allergies








Coded Allergies Type Severity Reaction Last Updated Verified


 


  No Known Drug Allergies    10/8/16 No











Physical Exam:


PE:





Constitutional: Well developed, well nourished, no acute distress, non-toxic 

appearance. 


HENT: Normocephalic, atraumatic,


Eyes: EOMI, conjunctiva normal, no discharge.  


Neck: Normal range of motion,  supple, 


Cardiovascular: S1/2 present, regular rhythm


Lungs & Thorax: Speaking in full sentences, bilateral equal chest rise, no 

tachypnea or increased work of breathing


Abdomen:  soft, ruq pain with positive Hodge sign on physical exam


Skin: Warm, dry, no erythema, no rash. [] 


Back: No tenderness, no CVA tenderness. [] 


Extremities: No tenderness, no cyanosis, no edema


Neurologic: Alert and oriented X 3, normal motor function, normal sensory 

function, no focal deficits noted. []


Psychologic: Affect normal, judgement normal, mood normal.





EKG:


EKG:


Sinus rhythm at 65 bpm, left axis deviation, normal intervals, possible T wave 

inversion V2, no ST elevations or ST depressions





Radiology/Procedures:


Radiology/Procedures:


                                 IMAGING REPORT





                                     Signed





PATIENT: PAMELA DU  ACCOUNT: [id number]     MRN#: [id number]


: 1960           LOCATION: ER              AGE: 60


SEX: F                    EXAM DT: 21         ACCESSION#: [id number]


STATUS: REG ER            ORD. PHYSICIAN: CASSIA CANO DO


REASON: ruq pain, asess gb


PROCEDURE: ABDOMEN LTD





Exam: Ultrasound abdomen limited





Indication: Right upper quadrant pain, assess gallbladder





Technique: Real-time grayscale and color Doppler images of the right upper 

quadrant were obtained by the department sonographer.





Comparisons: None





FINDINGS:


Increased echogenicity of the liver, likely related to hepatic steatosis. 

Vascular flow identified within the vascular flow.





Gallbladder is distended with internal debris. No pericholecystic fluid or wall 

thickening. There is a sonographic Hodge sign per the department sonographer' 

s. Common bile duct is dilated measuring 1.5 cm in diameter.





Right kidney measures 11.6 cm in length. No hydronephrosis.





Visualized portions aorta and IVC are unremarkable.





IMPRESSION:


1.  Dilated gallbladder and common bile duct. An obstructing stone or lesion is 

not identified. There is a positive sonographic Hodge sign per the sonographer,

 which can be seen in setting of acute cholecystitis. If there is persistent 

concern for obstruction HIDA scan would better evaluate.


2.  Hepatic steatosis.


3.  No right-sided hydronephrosis.





Electronically signed by: Lakshmi Sy MD (2021 9:33 PM) [facility]














DICTATED and SIGNED BY:     LAKSHMI SY MD


DATE:     [id number] 0





Course & Med Decision Making:


Course & Med Decision Making


Pertinent Labs and Imaging studies reviewed. (See chart for details)





Concern for keratitis with large pseudocyst.  Will admit to the medical floor 

for further medical management.  Patient stable time of admission and agrees 

with this plan.  Nonemergent consults were placed by ed  for GI and 

surgery.





I have spoken with the patient and/or caregivers.  I have explained the patient'

s condition, diagnosis and treatment plan based on the information available to 

me at this time.  I have answered the patient's and/or caregivers questions and 

answered any concerns.  The patient and/or caregivers have as good an 

understanding of the patient's diagnosis, condition and treatment plan as can be

 expected at this point.  The patient has been stabilized within the capability 

of the emergency department.  The patient will be transported for further care 

and management or will be moved to an observation or inpatient service.  I have 

communicated with the staff or medical practitioner taking over this patient's 

care.





Dragon Disclaimer:


Dragon Disclaimer:


This electronic medical record was generated, in whole or in part, using a voice

 recognition dictation system.





Departure


Departure


Impression:  


   Primary Impression:  


   Acute on chronic pancreatitis


   Additional Impressions:  


   Transaminitis


   Right upper quadrant abdominal pain with positive Hodge's Sign


Disposition:   ADMITTED AS INPT THIS HOSP


Admitting Physician:  ANGELIKA (Dr. Ann)


Condition:  STABLE


Referrals:  


ANDRES CARDONA MD (PCP)


Scripts


Pantoprazole Sodium (PANTOPRAZOLE SODIUM  **) 40 Mg Tablet.


40 MG PO DAILYAC for GERD for 30 Days, #30 TAB.SR


   Prov: ALEJANDRA YIN MD         21 


Mag Hydrox/Al Hydrox/Simeth (MAG-AL PLUS XS SUSPENSION) 30 Ml Oral.susp


30 ML PO PRN Q3HRS PRN for HEARTBURN / GAS for 14 Days, #240 MISC


   Prov: ALEJANDRA YIN MD         21 


Acetaminophen (TYLENOL) 325 Mg Tablet


650 MG PO PRN Q6HRS PRN for Headaches, Temp > 101.5F for 14 Days, #90 TAB


   Prov: ALEJANDRA YIN MD         21











[facility] Res[facility] HospitalCASSIA DO               2021 20:05

## 2021-01-20 NOTE — NUR
Admit from ER w/ c/o upper abdominal "burning pain",  also chronic lower left back and flank 
pain. Treating abd pain w/ Pepto Bismol, started 1/19/20.  Takes Gely for chronic pain back.

## 2021-01-21 VITALS — DIASTOLIC BLOOD PRESSURE: 60 MMHG | SYSTOLIC BLOOD PRESSURE: 152 MMHG

## 2021-01-21 VITALS — DIASTOLIC BLOOD PRESSURE: 84 MMHG | SYSTOLIC BLOOD PRESSURE: 111 MMHG

## 2021-01-21 VITALS — SYSTOLIC BLOOD PRESSURE: 146 MMHG | DIASTOLIC BLOOD PRESSURE: 85 MMHG

## 2021-01-21 VITALS — SYSTOLIC BLOOD PRESSURE: 175 MMHG | DIASTOLIC BLOOD PRESSURE: 82 MMHG

## 2021-01-21 VITALS — DIASTOLIC BLOOD PRESSURE: 68 MMHG | SYSTOLIC BLOOD PRESSURE: 134 MMHG

## 2021-01-21 VITALS — SYSTOLIC BLOOD PRESSURE: 119 MMHG | DIASTOLIC BLOOD PRESSURE: 70 MMHG

## 2021-01-21 LAB
AMPHETAMINE/METHAMPHETAMINE: (no result)
ANION GAP SERPL CALC-SCNC: 10 MMOL/L (ref 6–14)
APTT PPP: (no result) S
BACTERIA #/AREA URNS HPF: (no result) /HPF
BARBITURATES UR-MCNC: (no result) UG/ML
BASOPHILS # BLD AUTO: 0.1 X10^3/UL (ref 0–0.2)
BASOPHILS NFR BLD: 1 % (ref 0–3)
BENZODIAZ UR-MCNC: (no result) UG/L
BILIRUB UR QL STRIP: (no result)
BUN SERPL-MCNC: 9 MG/DL (ref 7–20)
CALCIUM SERPL-MCNC: 8.5 MG/DL (ref 8.5–10.1)
CANNABINOIDS UR-MCNC: (no result) UG/L
CHLORIDE SERPL-SCNC: 105 MMOL/L (ref 98–107)
CO2 SERPL-SCNC: 26 MMOL/L (ref 21–32)
COCAINE UR-MCNC: (no result) NG/ML
CREAT SERPL-MCNC: 1 MG/DL (ref 0.6–1)
CRP SERPL-MCNC: 4 MG/L (ref 0–3.3)
EOSINOPHIL NFR BLD: 0.1 X10^3/UL (ref 0–0.7)
EOSINOPHIL NFR BLD: 2 % (ref 0–3)
ERYTHROCYTE [DISTWIDTH] IN BLOOD BY AUTOMATED COUNT: 15.6 % (ref 11.5–14.5)
FIBRINOGEN PPP-MCNC: CLEAR MG/DL
GFR SERPLBLD BASED ON 1.73 SQ M-ARVRAT: 56.2 ML/MIN
GLUCOSE SERPL-MCNC: 104 MG/DL (ref 70–99)
HCT VFR BLD CALC: 38.5 % (ref 36–47)
HGB BLD-MCNC: 13.1 G/DL (ref 12–15.5)
HYALINE CASTS #/AREA URNS LPF: (no result) /HPF
LYMPHOCYTES # BLD: 1.7 X10^3/UL (ref 1–4.8)
LYMPHOCYTES NFR BLD AUTO: 30 % (ref 24–48)
MCH RBC QN AUTO: 35 PG (ref 25–35)
MCHC RBC AUTO-ENTMCNC: 34 G/DL (ref 31–37)
MCV RBC AUTO: 102 FL (ref 79–100)
METHADONE SERPL-MCNC: (no result) NG/ML
MONO #: 0.5 X10^3/UL (ref 0–1.1)
MONOCYTES NFR BLD: 9 % (ref 0–9)
NEUT #: 3.2 X10^3/UL (ref 1.8–7.7)
NEUTROPHILS NFR BLD AUTO: 58 % (ref 31–73)
NITRITE UR QL STRIP: POSITIVE
OPIATES UR-MCNC: (no result) NG/ML
PCP SERPL-MCNC: (no result) MG/DL
PH UR STRIP: 6 [PH]
PLATELET # BLD AUTO: 285 X10^3/UL (ref 140–400)
POTASSIUM SERPL-SCNC: 3.9 MMOL/L (ref 3.5–5.1)
PROT UR STRIP-MCNC: NEGATIVE MG/DL
RBC # BLD AUTO: 3.78 X10^6/UL (ref 3.5–5.4)
RBC #/AREA URNS HPF: (no result) /HPF (ref 0–2)
SODIUM SERPL-SCNC: 141 MMOL/L (ref 136–145)
UROBILINOGEN UR-MCNC: 0.2 MG/DL
WBC # BLD AUTO: 5.5 X10^3/UL (ref 4–11)
WBC #/AREA URNS HPF: (no result) /HPF (ref 0–4)

## 2021-01-21 RX ADMIN — HYDROMORPHONE HYDROCHLORIDE PRN MG: 2 INJECTION INTRAMUSCULAR; INTRAVENOUS; SUBCUTANEOUS at 16:27

## 2021-01-21 RX ADMIN — BACITRACIN SCH MLS/HR: 5000 INJECTION, POWDER, FOR SOLUTION INTRAMUSCULAR at 19:42

## 2021-01-21 RX ADMIN — HYDROMORPHONE HYDROCHLORIDE PRN MG: 2 INJECTION INTRAMUSCULAR; INTRAVENOUS; SUBCUTANEOUS at 19:40

## 2021-01-21 RX ADMIN — NICOTINE SCH PATCH: 21 PATCH, EXTENDED RELEASE TOPICAL at 16:12

## 2021-01-21 RX ADMIN — HYDROMORPHONE HYDROCHLORIDE PRN MG: 2 INJECTION INTRAMUSCULAR; INTRAVENOUS; SUBCUTANEOUS at 12:32

## 2021-01-21 RX ADMIN — HYDROMORPHONE HYDROCHLORIDE PRN MG: 2 INJECTION INTRAMUSCULAR; INTRAVENOUS; SUBCUTANEOUS at 05:31

## 2021-01-21 RX ADMIN — LEVOTHYROXINE SODIUM SCH MCG: 150 TABLET ORAL at 06:00

## 2021-01-21 RX ADMIN — BACITRACIN SCH MLS/HR: 5000 INJECTION, POWDER, FOR SOLUTION INTRAMUSCULAR at 08:00

## 2021-01-21 RX ADMIN — BACITRACIN SCH MLS/HR: 5000 INJECTION, POWDER, FOR SOLUTION INTRAMUSCULAR at 12:41

## 2021-01-21 RX ADMIN — HYDROMORPHONE HYDROCHLORIDE PRN MG: 2 INJECTION INTRAMUSCULAR; INTRAVENOUS; SUBCUTANEOUS at 23:50

## 2021-01-21 RX ADMIN — LOSARTAN POTASSIUM SCH MG: 50 TABLET ORAL at 09:00

## 2021-01-21 RX ADMIN — PANTOPRAZOLE SODIUM SCH MG: 40 INJECTION, POWDER, FOR SOLUTION INTRAVENOUS at 16:23

## 2021-01-21 RX ADMIN — ENOXAPARIN SODIUM SCH MG: 40 INJECTION SUBCUTANEOUS at 19:34

## 2021-01-21 RX ADMIN — HYDROMORPHONE HYDROCHLORIDE PRN MG: 2 INJECTION INTRAMUSCULAR; INTRAVENOUS; SUBCUTANEOUS at 09:22

## 2021-01-21 NOTE — PDOC1
History and Physical


Date of Admission


Date of Admission


DATE: 1/21/21 


TIME: 08:00





Identification/Chief Complaint


Chief Complaint


Abdominal pain





Source


Source:  Chart review, Patient





History of Present Illness


History of Present Illness


Patient 60-year-old female presents to the ER with complaint of left upper 

quadrant abdominal pain.  She was seen in the ED yesterday for similar symptoms,

but left AMA.  CT abdomen/pelvis at that time showed acute on chronic hepatitis 

with 7.8 cm pancreatic pseudocyst and distended gallbladder.  She returned to 

the ER today the behest of her PCP.  She reports abdominal pain, 10/10.





Past Medical History


Past Medical History


Hypertension, kidney stones, cervical cancer, chronic pain





Past Surgical History


Past Surgical History


Tubal ligation, ureteral stents, laparoscopy, cancer surgery





Family History


Family History


Denies significant family history





Social History


Smoke:  1 pack per day


ALCOHOL:  none


Drugs:  None





Current Problem List


Problem List


Problems


Medical Problems:


(1) Acute on chronic pancreatitis


Status: Acute  





(2) Right upper quadrant abdominal pain with positive Hodge's Sign


Status: Acute  





(3) Transaminitis


Status: Acute  











Current Medications


Current Medications





Current Medications


Sodium Chloride 1,000 ml @  1,000 mls/hr Q1H IV  Last administered on 1/20/21at 

20:15;  Start 1/20/21 at 20:00;  Stop 1/20/21 at 20:59;  Status DC


Sodium Chloride 1,000 ml @  250 mls/hr Q4H IV  Last administered on 1/20/21at 

21:00;  Start 1/20/21 at 20:00;  Stop 1/21/21 at 02:05;  Status DC


Hydromorphone HCl (Dilaudid) 1 mg 1X  ONCE IVP  Last administered on 1/20/21at 

20:18;  Start 1/20/21 at 20:00;  Stop 1/20/21 at 20:11;  Status DC


Hydromorphone HCl (Dilaudid) 2 mg STK-MED ONCE .ROUTE ;  Start 1/20/21 at 20:03;

 Stop 1/20/21 at 20:03;  Status DC


Hydromorphone HCl (Dilaudid) 1 mg 1X  ONCE IVP  Last administered on 1/20/21at 

21:21;  Start 1/20/21 at 21:15;  Stop 1/20/21 at 21:16;  Status DC


Ondansetron HCl (Zofran) 4 mg PRN Q8HRS  PRN IV NAUSEA/VOMITING;  Start 1/20/21 

at 22:00;  Stop 1/21/21 at 21:59


Sodium Chloride 1,000 ml @  100 mls/hr Q10H IV ;  Start 1/20/21 at 22:00;  Stop 

1/21/21 at 21:59


Acetaminophen (Tylenol) 650 mg PRN Q4HRS  PRN PO FEVER > 100.3'F;  Start 1/20/21

at 22:00;  Stop 1/21/21 at 21:59


Hydromorphone HCl (Dilaudid) 2 mg PRN Q4HRS  PRN IVP PAIN Last administered on 

1/20/21at 23:02;  Start 1/20/21 at 22:00;  Stop 1/20/21 at 23:32;  Status DC


Hydromorphone HCl (Dilaudid) 4 mg PRN Q2HR  PRN IVP PAIN Last administered on 

1/21/21at 05:31;  Start 1/20/21 at 23:30


Ondansetron HCl (Zofran) 4 mg PRN Q6HRS  PRN IVP NAUSEA/VOMITING;  Start 1/20/21

at 23:45


Prochlorperazine Edisylate (Compazine) 10 mg PRN Q6HRS  PRN IVP NAUSEA/VOMITING;

 Start 1/20/21 at 23:45


Al Hydroxide/Mg Hydroxide (Mylanta Plus Xs) 30 ml PRN Q3HRS  PRN PO HEARTBURN / 

GAS;  Start 1/20/21 at 23:45


Calcium Carbonate/ Glycine (Tums) 500 mg PRN Q3HRS  PRN PO UPSET STOMACH;  Start

1/20/21 at 23:45


Acetaminophen (Tylenol) 650 mg PRN Q6HRS  PRN PO Headaches, Temp > 101.5F;  

Start 1/20/21 at 23:45


Magnesium Hydroxide (Milk Of Magnesia) 2,400 mg PRN Q12HR  PRN PO CONSTIPATION; 

Start 1/20/21 at 23:45


Bisacodyl (Dulcolax Supp) 10 mg PRN DAILY  PRN CT CONSTIPATION;  Start 1/20/21 

at 23:45


Enoxaparin Sodium (Lovenox 40mg Syringe) 40 mg Q24H SQ ;  Start 1/20/21 at 23:45


Amlodipine Besylate (Norvasc) 10 mg DAILY PO ;  Start 1/21/21 at 09:00


Cyclobenzaprine HCl (Flexeril) 10 mg PRN TID  PRN PO PAIN;  Start 1/20/21 at 

23:45


Hydrochlorothiazide (Hydrodiuril) 25 mg DAILY PO ;  Start 1/21/21 at 09:00


Levothyroxine Sodium (Synthroid) 150 mcg DAILY06 PO ;  Start 1/21/21 at 06:00


Lidocaine (Lidoderm) 1 patch PRN DAILY  PRN TP PAIN;  Start 1/20/21 at 23:45


Oxycodone HCl (Roxicodone) 30 mg PRN Q4HRS  PRN PO PAIN Last administered on 

1/21/21at 00:21;  Start 1/20/21 at 23:45


Sumatriptan Succinate (Imitrex) 100 mg PRN Q24HRS  PRN PO HEADACHE;  Start 

1/20/21 at 23:45


Losartan Potassium (Cozaar) 100 mg DAILY PO ;  Start 1/21/21 at 09:00


Lorazepam (Ativan Inj) 1 mg PRN Q4HRS  PRN IVP ANXIETY / AGITATION Last 

administered on 1/21/21at 07:00;  Start 1/20/21 at 23:45


Miscellaneous (Lidoderm Patch Removal) 1 ea QHS  ;  Start 1/22/21 at 21:00





Active Scripts


Active


Pyridium (Phenazopyridine Hcl) 100 Mg Tablet 100 Mg PO TID


Cipro (Ciprofloxacin Hcl) 250 Mg Tablet 1 Tab PO BID


Lidocaine PATCH  ** (Lidocaine) 1 Each Adh..patch 1 Each TP DAILY


     REMOVE AFTER 12 HOURS


Lidocaine PATCH  ** (Lidocaine) 1 Each Adh..patch 1 Each TP DAILY PRN


Diclofenac Sodium 50 Mg Tablet.dr 1 Tab PO BID


Cyclobenzaprine Hcl 10 Mg Tablet 1 Tab PO TID PRN


Bentyl (Dicyclomine Hcl) 10 Mg/1 Ml Ampul 10 Mg IM BID


Cyclobenzaprine Hcl 10 Mg Tablet 1 Tab PO TID PRN


Levaquin (Levofloxacin) 500 Mg Tablet 1 Tab PO DAILY


Macrobid 100 Mg Capsule (Nitrofurantoin Monohyd/M-Cryst) 100 Mg Capsule 1 Cap PO

BID


Cyclobenzaprine Hcl 10 Mg Tablet 1 Tab PO TID 30 Days


Levothyroxine Sodium 150 Mcg Tablet 150 Mcg PO DAILY06 30 Days


Imitrex (Sumatriptan Succinate) 100 Mg Tablet 1 Tab PO UD


Losartan Potassium 100 Mg Tablet 100 Mg PO DAILY 30 Days


Hydrochlorothiazide Tablet  ** (Hydrochlorothiazide) 25 Mg Tablet 1 Tab PO DAILY


Amlodipine Besylate 10 Mg Tablet 10 Mg PO DAILY 30 Days


Reported


Oxycodone Hcl Immed.release ** (Oxycodone Hcl) 5 Mg Tablet 30 Mg PO PRN Q4HRS 

PRN





Allergies


Allergies:  


Coded Allergies:  


     No Known Drug Allergies (Unverified , 10/8/16)





ROS


Review of System


GENERAL:  No history of weight change, weakness or fevers.


SKIN:  No bruising, hair changes or rashes.


EYES:  No blurred, double or loss of vision.


NOSE AND THROAT:  No history of nosebleeds, hoarseness or sore throat.


HEART:  Denies chest pain, denies palpitations.


LUNGS:  Denies cough, hemoptysis, wheezing or shortness of breath.


GASTROINTESTINAL: Abdominal pain..


GENITOURINARY: Denies dysuria, frequency, urgency, hematuria.


NEUROLOGIC:  Denies history of numbness, tingling, tremor or weakness.


PSYCHIATRIC: Denies anxiety, denies depression.


ENDOCRINE:  No history of heat or cold intolerance, polyuria or polydipsia.


EXTREMITIES:  Denies muscle weakness, joint pain, pain on walking or stiffness.





Physical Exam


Physical Exam


General:  Alert, Oriented X3, Cooperative, mild distress


HEENT:  PERRLA, EOMI


Lungs:  Clear to auscultation, Normal air movement


Heart:  RRR, no murmurs


Cardiovascular:  S1, S2


Abdomen: Right upper quadrant epigastric tenderness.  Normal bowel sounds, Soft.


Extremities:  No clubbing, No cyanosis


Skin:  No rashes, No significant lesion


Neuro:  Normal speech, Normal tone, Sensation intact


Psych/Mental Status:  Mental status NL, Mood NL





Vitals


Vitals





Vital Signs








  Date Time  Temp Pulse Resp B/P (MAP) Pulse Ox O2 Delivery O2 Flow Rate FiO2


 


1/21/21 06:09   20     


 


1/21/21 05:31      Room Air  


 


1/21/21 02:39 98.3 64  119/70 (86) 96   





 98.3       











Labs


Labs





Laboratory Tests








Test


 1/20/21


20:10 1/20/21


23:40


 


White Blood Count


 7.8 x10^3/uL


(4.0-11.0) 





 


Red Blood Count


 3.91 x10^6/uL


(3.50-5.40) 





 


Hemoglobin


 13.6 g/dL


(12.0-15.5) 





 


Hematocrit


 38.7 %


(36.0-47.0) 





 


Mean Corpuscular Volume 99 fL ()  


 


Mean Corpuscular Hemoglobin 35 pg (25-35)  


 


Mean Corpuscular Hemoglobin


Concent 35 g/dL


(31-37) 





 


Red Cell Distribution Width


 15.5 %


(11.5-14.5) 





 


Platelet Count


 346 x10^3/uL


(140-400) 





 


Neutrophils (%) (Auto) 62 % (31-73)  


 


Lymphocytes (%) (Auto) 28 % (24-48)  


 


Monocytes (%) (Auto) 9 % (0-9)  


 


Eosinophils (%) (Auto) 1 % (0-3)  


 


Basophils (%) (Auto) 0 % (0-3)  


 


Neutrophils # (Auto)


 4.8 x10^3/uL


(1.8-7.7) 





 


Lymphocytes # (Auto)


 2.2 x10^3/uL


(1.0-4.8) 





 


Monocytes # (Auto)


 0.7 x10^3/uL


(0.0-1.1) 





 


Eosinophils # (Auto)


 0.1 x10^3/uL


(0.0-0.7) 





 


Basophils # (Auto)


 0.0 x10^3/uL


(0.0-0.2) 





 


Sodium Level


 137 mmol/L


(136-145) 





 


Potassium Level


 3.6 mmol/L


(3.5-5.1) 





 


Chloride Level


 99 mmol/L


() 





 


Carbon Dioxide Level


 24 mmol/L


(21-32) 





 


Anion Gap 14 (6-14)  


 


Blood Urea Nitrogen


 10 mg/dL


(7-20) 





 


Creatinine


 1.1 mg/dL


(0.6-1.0) 





 


Estimated GFR


(Cockcroft-Gault) 50.3 


 





 


Glucose Level


 97 mg/dL


(70-99) 





 


Calcium Level


 9.6 mg/dL


(8.5-10.1) 





 


Total Bilirubin


 0.9 mg/dL


(0.2-1.0) 





 


Direct Bilirubin


 0.5 mg/dL


(0.0-0.2) 





 


Aspartate Amino Transf


(AST/SGOT) 281 U/L


(15-37) 





 


Alanine Aminotransferase


(ALT/SGPT) 450 U/L


(14-59) 





 


Alkaline Phosphatase


 287 U/L


() 





 


Troponin I Quantitative


 0.023 ng/mL


(0.000-0.055) 





 


Total Protein


 8.2 g/dL


(6.4-8.2) 





 


Albumin


 3.8 g/dL


(3.4-5.0) 





 


Lipase


 1910 U/L


() 





 


Ethyl Alcohol Level


 < 10 mg/dL


(0-10) 





 


Urine Collection Type  Unknown 


 


Urine Color  Marcelina 


 


Urine Clarity  Clear 


 


Urine pH  6.0 (<5.0-8.0) 


 


Urine Specific Gravity


 


 1.020


(1.000-1.030)


 


Urine Protein


 


 Negative mg/dL


(NEG-TRACE)


 


Urine Glucose (UA)


 


 Negative mg/dL


(NEG)


 


Urine Ketones (Stick)


 


 Negative mg/dL


(NEG)


 


Urine Blood  Negative (NEG) 


 


Urine Nitrite  Positive (NEG) 


 


Urine Bilirubin  Small (NEG) 


 


Urine Urobilinogen Dipstick


 


 0.2 mg/dL (0.2


mg/dL)


 


Urine Leukocyte Esterase  Moderate (NEG) 


 


Urine RBC  Occ /HPF (0-2) 


 


Urine WBC


 


 20-40 /HPF


(0-4)


 


Urine Squamous Epithelial


Cells 


 Mod /LPF 





 


Urine Bacteria


 


 Many /HPF


(0-FEW)


 


Urine Hyaline Casts  Few /HPF 


 


Urine Mucus  Mod /LPF 


 


Urine Opiates Screen  Pos (NEG) 


 


Urine Methadone Screen  Neg (NEG) 


 


Urine Barbiturates  Neg (NEG) 


 


Urine Phencyclidine Screen  Neg (NEG) 


 


Urine


Amphetamine/Methamphetamine 


 Neg (NEG) 





 


Urine Benzodiazepines Screen  Neg (NEG) 


 


Urine Cocaine Screen  Neg (NEG) 


 


Urine Cannabinoids Screen  Neg (NEG) 


 


Urine Ethyl Alcohol  Neg (NEG) 








Laboratory Tests








Test


 1/20/21


20:10 1/20/21


23:40


 


White Blood Count


 7.8 x10^3/uL


(4.0-11.0) 





 


Red Blood Count


 3.91 x10^6/uL


(3.50-5.40) 





 


Hemoglobin


 13.6 g/dL


(12.0-15.5) 





 


Hematocrit


 38.7 %


(36.0-47.0) 





 


Mean Corpuscular Volume 99 fL ()  


 


Mean Corpuscular Hemoglobin 35 pg (25-35)  


 


Mean Corpuscular Hemoglobin


Concent 35 g/dL


(31-37) 





 


Red Cell Distribution Width


 15.5 %


(11.5-14.5) 





 


Platelet Count


 346 x10^3/uL


(140-400) 





 


Neutrophils (%) (Auto) 62 % (31-73)  


 


Lymphocytes (%) (Auto) 28 % (24-48)  


 


Monocytes (%) (Auto) 9 % (0-9)  


 


Eosinophils (%) (Auto) 1 % (0-3)  


 


Basophils (%) (Auto) 0 % (0-3)  


 


Neutrophils # (Auto)


 4.8 x10^3/uL


(1.8-7.7) 





 


Lymphocytes # (Auto)


 2.2 x10^3/uL


(1.0-4.8) 





 


Monocytes # (Auto)


 0.7 x10^3/uL


(0.0-1.1) 





 


Eosinophils # (Auto)


 0.1 x10^3/uL


(0.0-0.7) 





 


Basophils # (Auto)


 0.0 x10^3/uL


(0.0-0.2) 





 


Sodium Level


 137 mmol/L


(136-145) 





 


Potassium Level


 3.6 mmol/L


(3.5-5.1) 





 


Chloride Level


 99 mmol/L


() 





 


Carbon Dioxide Level


 24 mmol/L


(21-32) 





 


Anion Gap 14 (6-14)  


 


Blood Urea Nitrogen


 10 mg/dL


(7-20) 





 


Creatinine


 1.1 mg/dL


(0.6-1.0) 





 


Estimated GFR


(Cockcroft-Gault) 50.3 


 





 


Glucose Level


 97 mg/dL


(70-99) 





 


Calcium Level


 9.6 mg/dL


(8.5-10.1) 





 


Total Bilirubin


 0.9 mg/dL


(0.2-1.0) 





 


Direct Bilirubin


 0.5 mg/dL


(0.0-0.2) 





 


Aspartate Amino Transf


(AST/SGOT) 281 U/L


(15-37) 





 


Alanine Aminotransferase


(ALT/SGPT) 450 U/L


(14-59) 





 


Alkaline Phosphatase


 287 U/L


() 





 


Troponin I Quantitative


 0.023 ng/mL


(0.000-0.055) 





 


Total Protein


 8.2 g/dL


(6.4-8.2) 





 


Albumin


 3.8 g/dL


(3.4-5.0) 





 


Lipase


 1910 U/L


() 





 


Ethyl Alcohol Level


 < 10 mg/dL


(0-10) 





 


Urine Collection Type  Unknown 


 


Urine Color  Marcelina 


 


Urine Clarity  Clear 


 


Urine pH  6.0 (<5.0-8.0) 


 


Urine Specific Gravity


 


 1.020


(1.000-1.030)


 


Urine Protein


 


 Negative mg/dL


(NEG-TRACE)


 


Urine Glucose (UA)


 


 Negative mg/dL


(NEG)


 


Urine Ketones (Stick)


 


 Negative mg/dL


(NEG)


 


Urine Blood  Negative (NEG) 


 


Urine Nitrite  Positive (NEG) 


 


Urine Bilirubin  Small (NEG) 


 


Urine Urobilinogen Dipstick


 


 0.2 mg/dL (0.2


mg/dL)


 


Urine Leukocyte Esterase  Moderate (NEG) 


 


Urine RBC  Occ /HPF (0-2) 


 


Urine WBC


 


 20-40 /HPF


(0-4)


 


Urine Squamous Epithelial


Cells 


 Mod /LPF 





 


Urine Bacteria


 


 Many /HPF


(0-FEW)


 


Urine Hyaline Casts  Few /HPF 


 


Urine Mucus  Mod /LPF 


 


Urine Opiates Screen  Pos (NEG) 


 


Urine Methadone Screen  Neg (NEG) 


 


Urine Barbiturates  Neg (NEG) 


 


Urine Phencyclidine Screen  Neg (NEG) 


 


Urine


Amphetamine/Methamphetamine 


 Neg (NEG) 





 


Urine Benzodiazepines Screen  Neg (NEG) 


 


Urine Cocaine Screen  Neg (NEG) 


 


Urine Cannabinoids Screen  Neg (NEG) 


 


Urine Ethyl Alcohol  Neg (NEG) 











Images


Images


CT scan of the abdomen and pelvis with contrast 1/19/2021





CLINICAL HISTORY: Abdominal pain.





TECHNIQUE: After the oral and intravenous ministration contrast, contiguous, 5 

mm axial sections were obtained through the abdomen and pelvis.





One or more of the following individualized dose reduction techniques were 

utilized for this study:





1. Automated exposure control.


2. Adjustment of the mA and/or kV according to patient size.


3. Use of iterative reconstruction technique.








FINDINGS: Comparison study is dated 10/28/2019.





Images through the lung bases demonstrate minimal dependent subsegmental 

atelectasis bilaterally.





The liver parenchyma has a decreased attenuation consistent with fatty 

infiltration. Small calcified granulomas are seen scattered throughout the 

spleen. The pancreas is within normal limits. Rounded low-attenuation lesions 

are seen involving both kidneys which measure 5 mm to 1 cm in size. They likely 

represent cysts. No further imaging evaluation is recommended. A 7 mm 

nonobstructing calculus is seen involving the lower pole the right kidney.





Slight prominence of the body and tail of pancreas is seen. Increased density 

seen within the fat surrounding this portion of the pancreas consistent with 

acute pancreatitis. A low-attenuation lesion with a thickened wall is seen in 

the head of the pancreas which is new since previous examination. This measures 

7.8 x 6.4 x 5.4 cm in AP, transverse and craniocaudal dimensions. This likely 

represents pancreatic pseudocyst. Punctate calcifications are seen scattered 

throughout the pancreas. No additional abnormal fluid collection is seen.





Atherosclerotic calcification abdominal aorta is seen. The abdominal aorta 

tapers normally. The gallbladder is distended. No free fluid or free air is seen

within the abdomen. There is no evidence of bowel obstruction. Patient is post 

ventral hernia repair. The appendix is well-visualized and is within normal 

limits.





Images through the pelvis demonstrate the urinary bladder distended with urine. 

Surgical clips are seen within the right pelvis. No adnexal mass is seen. No 

free fluid is noted. Mild S-shaped curvature of the thoracolumbar spine is seen.

Degenerative changes are seen involving lower thoracic and throughout the lumbar

spine along with both hips. Patient is post kyphoplasty type procedure involving

the L1 vertebral body.





IMPRESSION: Findings are seen consistent with acute on chronic pancreatitis. A 

7.8 cm pancreatic pseudocyst is seen within the head of the pancreas.














ABDOMEN LTD





Exam: Ultrasound abdomen limited





Indication: Right upper quadrant pain, assess gallbladder





Technique: Real-time grayscale and color Doppler images of the right upper 

quadrant were obtained by the department sonographer.





Comparisons: None





FINDINGS:


Increased echogenicity of the liver, likely related to hepatic steatosis. 

Vascular flow identified within the vascular flow.





Gallbladder is distended with internal debris. No pericholecystic fluid or wall 

thickening. There is a sonographic Hodge sign per the department sonographer' 

s. Common bile duct is dilated measuring 1.5 cm in diameter.





Right kidney measures 11.6 cm in length. No hydronephrosis.





Visualized portions aorta and IVC are unremarkable.





IMPRESSION:


1.  Dilated gallbladder and common bile duct. An obstructing stone or lesion is 

not identified. There is a positive sonographic Hodge sign per the sonographer,

which can be seen in setting of acute cholecystitis. If there is persistent 

concern for obstruction HIDA scan would better evaluate.


2.  Hepatic steatosis.


3.  No right-sided hydronephrosis.





VTE Prophylaxis Ordered


VTE Prophylaxis Devices:  No


VTE Pharmacological Prophylaxi:  Yes





Assessment/Plan


Assessment/Plan


Acute on chronic pancreatitis


Pancreatic pseudocyst


Dilated gallbladder vs cholecystitis


Transaminitis





Plan:


NPO; continue IV fluids at 250cc


Consult to GI


Follow urine output


Consult general surgery


No gallstones seen on ultrasound; will defer to general surgery for 

recommendations on HIDA scan


Dilaudid IV as needed for pain


Resume home medications


FEN - NPO


PPX  - Lovenox


FULL CODE


Dispo - inpatient for above





Justifications for Admission


Other Justification


Acute pancreatitis











MILTON BERRIOS MD            Jan 21, 2021 08:11

## 2021-01-21 NOTE — RAD
INDICATION:  Abdomen pain.



COMPARISON: January 20, 2021 exam



TECHNIQUE:

5.5mCi of Tc99m Choletec was injected intravenously followed by scintigraphic images of the abdomen.





FINDINGS:

Radiotracer clearance is seen from the blood pool with liver uptake and excretion into the bile ducts
 with some prominent retention of radiotracer within the bile ducts but there is eventual passage of 
radiotracer into the small bowel. The gallbladder is not visualized by the 60 minute time point. Afte
r administration of morphine there is eventual visualization of the gallbladder.



IMPRESSION:



1. Slow passage of radiotracer into the small bowel but there is eventual passage into the small manas
l without evidence of a high-grade bile duct obstruction. A partial obstruction of the bile ducts is 
not excluded given the slow passage.



2. The gallbladder is not visualized at the 60 minute time point but is seen after administration of 
morphine. Causes such as chronic cholecystitis are within the differential given this delayed visuali
zation.



Electronically signed by: Benny Lew MD (1/21/2021 3:49 PM) [id number]

## 2021-01-21 NOTE — PDOC2
TEA BENTLEY APRN 1/21/21 0936:


CONSULT


Date of Consult


Date of Consult


DATE: 1/21/21 


TIME: 09:25





Reason for Consult


Reason for Consult:


pancreatitis





Referring Physician


Referring Physician:


ER





Identification/Chief Complaint


Chief Complaint


abdominal pain





Source


Source:  Chart review, Patient





History of Present Illness


Reason for Visit:


3 weeks upper abdominal pain, epigastric, left side, chest.  Burning type pain, 

progressively worsened.  Severe last week.  Seen in ER, left AMA prior to 

results--CT with pancreatitis, pseudocyst--returned and admitted


denies any chronic liver issues 


NO hx of pancreatitis, no drinking in many years, drank some when 20s, not heav

mary grace





Past Medical History


Cardiovascular:  HTN


Pulmonary:  Bronchitis, COPD


Musculoskeletal:   low back pain


Endocrine:  Hypothyroidism





Past Surgical History


Past Surgical History:  Hernia Repair, Other (for cervical cancer)





Family History


Family History:  Family History Unknown





Social History


1 pack per day


ALCOHOL:  none


Drugs:  None





Current Problem List


Problem List


Problems


Medical Problems:


(1) Acute on chronic pancreatitis


Status: Acute  





(2) Right upper quadrant abdominal pain with positive Hodge's Sign


Status: Acute  





(3) Transaminitis


Status: Acute  











Current Medications


Current Medications





Current Medications


Sodium Chloride 1,000 ml @  1,000 mls/hr Q1H IV  Last administered on 1/20/21at 

20:15;  Start 1/20/21 at 20:00;  Stop 1/20/21 at 20:59;  Status DC


Sodium Chloride 1,000 ml @  250 mls/hr Q4H IV  Last administered on 1/20/21at 

21:00;  Start 1/20/21 at 20:00;  Stop 1/21/21 at 02:05;  Status DC


Hydromorphone HCl (Dilaudid) 1 mg 1X  ONCE IVP  Last administered on 1/20/21at 

20:18;  Start 1/20/21 at 20:00;  Stop 1/20/21 at 20:11;  Status DC


Hydromorphone HCl (Dilaudid) 2 mg STK-MED ONCE .ROUTE ;  Start 1/20/21 at 20:03;

 Stop 1/20/21 at 20:03;  Status DC


Hydromorphone HCl (Dilaudid) 1 mg 1X  ONCE IVP  Last administered on 1/20/21at 

21:21;  Start 1/20/21 at 21:15;  Stop 1/20/21 at 21:16;  Status DC


Ondansetron HCl (Zofran) 4 mg PRN Q8HRS  PRN IV NAUSEA/VOMITING;  Start 1/20/21 

at 22:00;  Stop 1/21/21 at 21:59


Sodium Chloride 1,000 ml @  100 mls/hr Q10H IV ;  Start 1/20/21 at 22:00;  Stop 

1/21/21 at 21:59


Acetaminophen (Tylenol) 650 mg PRN Q4HRS  PRN PO FEVER > 100.3'F;  Start 1/20/21

at 22:00;  Stop 1/21/21 at 21:59


Hydromorphone HCl (Dilaudid) 2 mg PRN Q4HRS  PRN IVP PAIN Last administered on 

1/20/21at 23:02;  Start 1/20/21 at 22:00;  Stop 1/20/21 at 23:32;  Status DC


Hydromorphone HCl (Dilaudid) 4 mg PRN Q2HR  PRN IVP PAIN Last administered on 

1/21/21at 05:31;  Start 1/20/21 at 23:30


Ondansetron HCl (Zofran) 4 mg PRN Q6HRS  PRN IVP NAUSEA/VOMITING;  Start 1/20/21

at 23:45


Prochlorperazine Edisylate (Compazine) 10 mg PRN Q6HRS  PRN IVP NAUSEA/VOMITING;

 Start 1/20/21 at 23:45


Al Hydroxide/Mg Hydroxide (Mylanta Plus Xs) 30 ml PRN Q3HRS  PRN PO HEARTBURN / 

GAS;  Start 1/20/21 at 23:45


Calcium Carbonate/ Glycine (Tums) 500 mg PRN Q3HRS  PRN PO UPSET STOMACH;  Start

1/20/21 at 23:45


Acetaminophen (Tylenol) 650 mg PRN Q6HRS  PRN PO Headaches, Temp > 101.5F;  

Start 1/20/21 at 23:45


Magnesium Hydroxide (Milk Of Magnesia) 2,400 mg PRN Q12HR  PRN PO CONSTIPATION; 

Start 1/20/21 at 23:45


Bisacodyl (Dulcolax Supp) 10 mg PRN DAILY  PRN FL CONSTIPATION;  Start 1/20/21 

at 23:45


Enoxaparin Sodium (Lovenox 40mg Syringe) 40 mg Q24H SQ ;  Start 1/20/21 at 23:45


Amlodipine Besylate (Norvasc) 10 mg DAILY PO ;  Start 1/21/21 at 09:00


Cyclobenzaprine HCl (Flexeril) 10 mg PRN TID  PRN PO PAIN;  Start 1/20/21 at 

23:45


Hydrochlorothiazide (Hydrodiuril) 25 mg DAILY PO ;  Start 1/21/21 at 09:00


Levothyroxine Sodium (Synthroid) 150 mcg DAILY06 PO ;  Start 1/21/21 at 06:00


Lidocaine (Lidoderm) 1 patch PRN DAILY  PRN TP PAIN;  Start 1/20/21 at 23:45


Oxycodone HCl (Roxicodone) 30 mg PRN Q4HRS  PRN PO PAIN Last administered on 

1/21/21at 00:21;  Start 1/20/21 at 23:45


Sumatriptan Succinate (Imitrex) 100 mg PRN Q24HRS  PRN PO HEADACHE;  Start 

1/20/21 at 23:45


Losartan Potassium (Cozaar) 100 mg DAILY PO ;  Start 1/21/21 at 09:00


Lorazepam (Ativan Inj) 1 mg PRN Q4HRS  PRN IVP ANXIETY / AGITATION Last 

administered on 1/21/21at 07:00;  Start 1/20/21 at 23:45


Miscellaneous (Lidoderm Patch Removal) 1 ea QHS MC ;  Start 1/22/21 at 21:00





Active Scripts


Active


Pyridium (Phenazopyridine Hcl) 100 Mg Tablet 100 Mg PO TID


Cipro (Ciprofloxacin Hcl) 250 Mg Tablet 1 Tab PO BID


Lidocaine PATCH  ** (Lidocaine) 1 Each Adh..patch 1 Each TP DAILY


     REMOVE AFTER 12 HOURS


Lidocaine PATCH  ** (Lidocaine) 1 Each Adh..patch 1 Each TP DAILY PRN


Diclofenac Sodium 50 Mg Tablet.dr 1 Tab PO BID


Cyclobenzaprine Hcl 10 Mg Tablet 1 Tab PO TID PRN


Bentyl (Dicyclomine Hcl) 10 Mg/1 Ml Ampul 10 Mg IM BID


Cyclobenzaprine Hcl 10 Mg Tablet 1 Tab PO TID PRN


Levaquin (Levofloxacin) 500 Mg Tablet 1 Tab PO DAILY


Macrobid 100 Mg Capsule (Nitrofurantoin Monohyd/M-Cryst) 100 Mg Capsule 1 Cap PO

BID


Cyclobenzaprine Hcl 10 Mg Tablet 1 Tab PO TID 30 Days


Levothyroxine Sodium 150 Mcg Tablet 150 Mcg PO DAILY06 30 Days


Imitrex (Sumatriptan Succinate) 100 Mg Tablet 1 Tab PO UD


Losartan Potassium 100 Mg Tablet 100 Mg PO DAILY 30 Days


Hydrochlorothiazide Tablet  ** (Hydrochlorothiazide) 25 Mg Tablet 1 Tab PO DAILY


Amlodipine Besylate 10 Mg Tablet 10 Mg PO DAILY 30 Days


Reported


Oxycodone Hcl Immed.release ** (Oxycodone Hcl) 5 Mg Tablet 30 Mg PO PRN Q4HRS 

PRN





Allergies


Allergies:  


Coded Allergies:  


     No Known Drug Allergies (Unverified , 10/8/16)





ROS


General:  YES: Fatigue; 


   No: Chills


PSYCHOLOGICAL ROS:  No: Anxiety, Depression


Eyes:  No Blurry vision, No Double vision


HEENT:  No: Heacaches, Sore Throat


Hematological and Lymphatic:  No: Bleeding Problems, Blood Clots


Respiratory:  No: Cough, Shortness of breath


Cardiovascular:  yes Chest Pain; 


   No Palpitations


Gastrointestinal:  Yes Other (see hpi)


Genitourinary:  No Dysuria, No Hematuria


Musculoskeletal:  Yes Joint Pain, Yes Muscle Pain


Neurological:  No Impaired Coord/balance, No Numbness/Tingling


Skin:  No Pruritus, No Rash





Physical Exam


General:  Alert, Oriented X3, Cooperative


HEENT:  Atraumatic, PERRLA


Lungs:  Clear to auscultation, Normal air movement


Heart:  Regular rate, Normal S1, Normal S2


Abdomen:  Soft, Other (ttp epigastric, LUQ, midline scar)


Extremities:  No clubbing, No cyanosis


Skin:  No rashes, No breakdown


Neuro:  Normal gait, Normal speech


Psych/Mental Status:  Mental status NL, Mood NL


MUSCULOSKELETAL:  No deformity, No swelling





Vitals


VITALS





Vital Signs








  Date Time  Temp Pulse Resp B/P (MAP) Pulse Ox O2 Delivery O2 Flow Rate FiO2


 


1/21/21 07:00 97.6 64 16 152/60 (90) 96 Room Air  





 97.6       











Labs


Labs





Laboratory Tests








Test


 1/20/21


20:10 1/20/21


23:40


 


White Blood Count


 7.8 x10^3/uL


(4.0-11.0) 





 


Red Blood Count


 3.91 x10^6/uL


(3.50-5.40) 





 


Hemoglobin


 13.6 g/dL


(12.0-15.5) 





 


Hematocrit


 38.7 %


(36.0-47.0) 





 


Mean Corpuscular Volume 99 fL ()  


 


Mean Corpuscular Hemoglobin 35 pg (25-35)  


 


Mean Corpuscular Hemoglobin


Concent 35 g/dL


(31-37) 





 


Red Cell Distribution Width


 15.5 %


(11.5-14.5) 





 


Platelet Count


 346 x10^3/uL


(140-400) 





 


Neutrophils (%) (Auto) 62 % (31-73)  


 


Lymphocytes (%) (Auto) 28 % (24-48)  


 


Monocytes (%) (Auto) 9 % (0-9)  


 


Eosinophils (%) (Auto) 1 % (0-3)  


 


Basophils (%) (Auto) 0 % (0-3)  


 


Neutrophils # (Auto)


 4.8 x10^3/uL


(1.8-7.7) 





 


Lymphocytes # (Auto)


 2.2 x10^3/uL


(1.0-4.8) 





 


Monocytes # (Auto)


 0.7 x10^3/uL


(0.0-1.1) 





 


Eosinophils # (Auto)


 0.1 x10^3/uL


(0.0-0.7) 





 


Basophils # (Auto)


 0.0 x10^3/uL


(0.0-0.2) 





 


Sodium Level


 137 mmol/L


(136-145) 





 


Potassium Level


 3.6 mmol/L


(3.5-5.1) 





 


Chloride Level


 99 mmol/L


() 





 


Carbon Dioxide Level


 24 mmol/L


(21-32) 





 


Anion Gap 14 (6-14)  


 


Blood Urea Nitrogen


 10 mg/dL


(7-20) 





 


Creatinine


 1.1 mg/dL


(0.6-1.0) 





 


Estimated GFR


(Cockcroft-Gault) 50.3 


 





 


Glucose Level


 97 mg/dL


(70-99) 





 


Calcium Level


 9.6 mg/dL


(8.5-10.1) 





 


Total Bilirubin


 0.9 mg/dL


(0.2-1.0) 





 


Direct Bilirubin


 0.5 mg/dL


(0.0-0.2) 





 


Aspartate Amino Transf


(AST/SGOT) 281 U/L


(15-37) 





 


Alanine Aminotransferase


(ALT/SGPT) 450 U/L


(14-59) 





 


Alkaline Phosphatase


 287 U/L


() 





 


Troponin I Quantitative


 0.023 ng/mL


(0.000-0.055) 





 


Total Protein


 8.2 g/dL


(6.4-8.2) 





 


Albumin


 3.8 g/dL


(3.4-5.0) 





 


Lipase


 1910 U/L


() 





 


Ethyl Alcohol Level


 < 10 mg/dL


(0-10) 





 


Urine Collection Type  Unknown 


 


Urine Color  Marcelina 


 


Urine Clarity  Clear 


 


Urine pH  6.0 (<5.0-8.0) 


 


Urine Specific Gravity


 


 1.020


(1.000-1.030)


 


Urine Protein


 


 Negative mg/dL


(NEG-TRACE)


 


Urine Glucose (UA)


 


 Negative mg/dL


(NEG)


 


Urine Ketones (Stick)


 


 Negative mg/dL


(NEG)


 


Urine Blood  Negative (NEG) 


 


Urine Nitrite  Positive (NEG) 


 


Urine Bilirubin  Small (NEG) 


 


Urine Urobilinogen Dipstick


 


 0.2 mg/dL (0.2


mg/dL)


 


Urine Leukocyte Esterase  Moderate (NEG) 


 


Urine RBC  Occ /HPF (0-2) 


 


Urine WBC


 


 20-40 /HPF


(0-4)


 


Urine Squamous Epithelial


Cells 


 Mod /LPF 





 


Urine Bacteria


 


 Many /HPF


(0-FEW)


 


Urine Hyaline Casts  Few /HPF 


 


Urine Mucus  Mod /LPF 


 


Urine Opiates Screen  Pos (NEG) 


 


Urine Methadone Screen  Neg (NEG) 


 


Urine Barbiturates  Neg (NEG) 


 


Urine Phencyclidine Screen  Neg (NEG) 


 


Urine


Amphetamine/Methamphetamine 


 Neg (NEG) 





 


Urine Benzodiazepines Screen  Neg (NEG) 


 


Urine Cocaine Screen  Neg (NEG) 


 


Urine Cannabinoids Screen  Neg (NEG) 


 


Urine Ethyl Alcohol  Neg (NEG) 








Laboratory Tests








Test


 1/20/21


20:10 1/20/21


23:40


 


White Blood Count


 7.8 x10^3/uL


(4.0-11.0) 





 


Red Blood Count


 3.91 x10^6/uL


(3.50-5.40) 





 


Hemoglobin


 13.6 g/dL


(12.0-15.5) 





 


Hematocrit


 38.7 %


(36.0-47.0) 





 


Mean Corpuscular Volume 99 fL ()  


 


Mean Corpuscular Hemoglobin 35 pg (25-35)  


 


Mean Corpuscular Hemoglobin


Concent 35 g/dL


(31-37) 





 


Red Cell Distribution Width


 15.5 %


(11.5-14.5) 





 


Platelet Count


 346 x10^3/uL


(140-400) 





 


Neutrophils (%) (Auto) 62 % (31-73)  


 


Lymphocytes (%) (Auto) 28 % (24-48)  


 


Monocytes (%) (Auto) 9 % (0-9)  


 


Eosinophils (%) (Auto) 1 % (0-3)  


 


Basophils (%) (Auto) 0 % (0-3)  


 


Neutrophils # (Auto)


 4.8 x10^3/uL


(1.8-7.7) 





 


Lymphocytes # (Auto)


 2.2 x10^3/uL


(1.0-4.8) 





 


Monocytes # (Auto)


 0.7 x10^3/uL


(0.0-1.1) 





 


Eosinophils # (Auto)


 0.1 x10^3/uL


(0.0-0.7) 





 


Basophils # (Auto)


 0.0 x10^3/uL


(0.0-0.2) 





 


Sodium Level


 137 mmol/L


(136-145) 





 


Potassium Level


 3.6 mmol/L


(3.5-5.1) 





 


Chloride Level


 99 mmol/L


() 





 


Carbon Dioxide Level


 24 mmol/L


(21-32) 





 


Anion Gap 14 (6-14)  


 


Blood Urea Nitrogen


 10 mg/dL


(7-20) 





 


Creatinine


 1.1 mg/dL


(0.6-1.0) 





 


Estimated GFR


(Cockcroft-Gault) 50.3 


 





 


Glucose Level


 97 mg/dL


(70-99) 





 


Calcium Level


 9.6 mg/dL


(8.5-10.1) 





 


Total Bilirubin


 0.9 mg/dL


(0.2-1.0) 





 


Direct Bilirubin


 0.5 mg/dL


(0.0-0.2) 





 


Aspartate Amino Transf


(AST/SGOT) 281 U/L


(15-37) 





 


Alanine Aminotransferase


(ALT/SGPT) 450 U/L


(14-59) 





 


Alkaline Phosphatase


 287 U/L


() 





 


Troponin I Quantitative


 0.023 ng/mL


(0.000-0.055) 





 


Total Protein


 8.2 g/dL


(6.4-8.2) 





 


Albumin


 3.8 g/dL


(3.4-5.0) 





 


Lipase


 1910 U/L


() 





 


Ethyl Alcohol Level


 < 10 mg/dL


(0-10) 





 


Urine Collection Type  Unknown 


 


Urine Color  Marcelina 


 


Urine Clarity  Clear 


 


Urine pH  6.0 (<5.0-8.0) 


 


Urine Specific Gravity


 


 1.020


(1.000-1.030)


 


Urine Protein


 


 Negative mg/dL


(NEG-TRACE)


 


Urine Glucose (UA)


 


 Negative mg/dL


(NEG)


 


Urine Ketones (Stick)


 


 Negative mg/dL


(NEG)


 


Urine Blood  Negative (NEG) 


 


Urine Nitrite  Positive (NEG) 


 


Urine Bilirubin  Small (NEG) 


 


Urine Urobilinogen Dipstick


 


 0.2 mg/dL (0.2


mg/dL)


 


Urine Leukocyte Esterase  Moderate (NEG) 


 


Urine RBC  Occ /HPF (0-2) 


 


Urine WBC


 


 20-40 /HPF


(0-4)


 


Urine Squamous Epithelial


Cells 


 Mod /LPF 





 


Urine Bacteria


 


 Many /HPF


(0-FEW)


 


Urine Hyaline Casts  Few /HPF 


 


Urine Mucus  Mod /LPF 


 


Urine Opiates Screen  Pos (NEG) 


 


Urine Methadone Screen  Neg (NEG) 


 


Urine Barbiturates  Neg (NEG) 


 


Urine Phencyclidine Screen  Neg (NEG) 


 


Urine


Amphetamine/Methamphetamine 


 Neg (NEG) 





 


Urine Benzodiazepines Screen  Neg (NEG) 


 


Urine Cocaine Screen  Neg (NEG) 


 


Urine Cannabinoids Screen  Neg (NEG) 


 


Urine Ethyl Alcohol  Neg (NEG) 











Assessment/Plan


Assessment/Plan


pancreatitis, pseudocyst





conservative measures


? cause of pancreatitis--GB with debris, distended--will review with GI, further

testing, ?imaging


LFTS elevated--? pass stone


hx of hypothyroidism, high TSH on previous labs--on medication--? last 

checked--defer to IPC


will review with Dr Lloyd





UTI on UA--per IPC





MEKHI LLOYD MD 1/21/21 1259:


CONSULT


Assessment/Plan


Assessment/Plan


Above noted;  primary treatment geared toward pancreatitis, bowel rest, pain 

control, hydration etc.  Follow pseudocyst to assess resolution;  No clear 

gallstones,  ?debris.  possible stone passed;  note marked hypothyroidism, 

?autoimmune etiology.  Defer to medicine for treatment











TEA BENTLEY            Jan 21, 2021 09:36


MEKHI LLOYD MD             Jan 21, 2021 12:59

## 2021-01-21 NOTE — NUR
SW following. Discussed with RN, pt from home with spouse, room air, NPO. GI and Surgery 
consulted. Med Assist following for self pay status. RN advised no SW needs at this time. SW 
will continue to follow.

## 2021-01-21 NOTE — NUR
Patient started off walking in hallways after asking for 4mg of Dilaudid and 1mg of Ativan.  
She stated, "I have a lot to think about, so I am just walking it off." As staff was busy in 
and out of other Patient's room, She walked off the unit with her IV pole and fluids and 
headed downstairs toward the ED exit door, stating, "I have a lot to process in my head, I 
am just going out for some air."  Patient had attempted to do the same thing during 
dayshift, where she told by the day nurse that she can not leave the unit with IV's 
unescorted.  She told dayshift that she was going to go down and make sure that her vehicle 
was locked.  Dayshift nurse, Nell VALERA had already advise her that it was against the rules 
and that she cannot just go downstairs.  Nell had security come up to take her car keys to 
check on her car.  She was non-compliant and left the unit at 20:49 P.M. any ways, Security 
saw her and escorted her back to unit.  At 21:25 P.M. she wanted and was insistent that 
staff allows her to microwave her stuff toy ([employer] from [employer] shop after it had been in bed 
with her.  This nurse explained to her that it was considered cross contamination and not 
allowed, called Maintenance for her to hear that putting used stuff toy in microwave was not 
a good choice.  She replied well that [facility] nurse did it multiple times for me during the 
day. Patient redirected back to her room for rest and healing.

## 2021-01-21 NOTE — PDOC2
GI CONSULT


Date of Service:


DATE: 1/21/21 


TIME: 09:38





Reason For Consult:


pancreatitis





HPI:


HPI:


61 y/o female who has been ill x 3 weeks.  "It started out of the blue" w/ 

diarrhea (up to 3-4 stools daily), stomachache ("burning"), and nausea.  Thinks 

might have some pain in back as well but she has chronic back pain so hard to 

say.  Pain might be worse with eating sometimes but sometimes not; in fact, she 

doesn't understand why she can't eat here although she's not hungry.  Tried 

Mylanta and Pepto at home for pain - not much help and now stools look black 

from Pepto.  Was seen in ER on 1/19, left AMA, returned yesterday after PCP 

called w/ lab/imaging results.





Denies reflux/heartburn, dysphagia, vomiting, constipation, hematochezia, 

melena, and weight loss.


Denies GB, liver, pancreas, and PUD history.


No previous EGD.  Thinks she had a colonoscopy at some point - tells me she 

doesn't recall where or when this was done or what was found.


No NSAIDs.  Takes oxycodone QID PRN ("sometimes 2 at a time so I don't have to 

mess with it for awhile") for back pain.


Has had 11 CTs of abd/pelv here since 2014.


Med list includes HCTZ.


Impressive TSH in 2017 - says taking thyroid medication now.





PMH:


PMH:


HTN, hypothyroidism, chronic back, ureterolithiasis, cervical cancer


tubal ligation, chemo and radiation, ureteral stents, lithotripsy, kyphoplasty





FH:


Family History:  No pertinent hx (denies pancreatitis, GI cancers), Cancer (GM -

cervical)





Social History:


Smoke:  1 pack per day


ALCOHOL:  none (drank "some" in her twenties, says she doesn't think "heavy")


Drugs:  None





ROS:





GEN: Denies fevers, chills, sweats


HEENT: Denies blurred vision, sore throat


CV: Denies chest pain


RESP: Denies shortness of air, cough


GI: Per HPI


: Denies hematuria, dysuria


ENDO: Denies weight changes


NEURO: Denies confusion, dizziness


MSK: +chronic back pain


SKIN: Denies jaundice, pruritus





Vitals:


Vitals:





                                   Vital Signs








  Date Time  Temp Pulse Resp B/P (MAP) Pulse Ox O2 Delivery O2 Flow Rate FiO2


 


1/21/21 09:22   20  94 Room Air  


 


1/21/21 07:00 97.6 64  152/60 (90)    





 97.6       











Labs:


Labs:





Laboratory Tests








Test


 1/20/21


20:10 1/20/21


23:40 1/21/21


09:15


 


White Blood Count


 7.8 x10^3/uL


(4.0-11.0) 


 5.5 x10^3/uL


(4.0-11.0)


 


Red Blood Count


 3.91 x10^6/uL


(3.50-5.40) 


 3.78 x10^6/uL


(3.50-5.40)


 


Hemoglobin


 13.6 g/dL


(12.0-15.5) 


 13.1 g/dL


(12.0-15.5)


 


Hematocrit


 38.7 %


(36.0-47.0) 


 38.5 %


(36.0-47.0)


 


Mean Corpuscular Volume


 99 fL () 


 


 102 fL


()


 


Mean Corpuscular Hemoglobin 35 pg (25-35)   35 pg (25-35) 


 


Mean Corpuscular Hemoglobin


Concent 35 g/dL


(31-37) 


 34 g/dL


(31-37)


 


Red Cell Distribution Width


 15.5 %


(11.5-14.5) 


 15.6 %


(11.5-14.5)


 


Platelet Count


 346 x10^3/uL


(140-400) 


 285 x10^3/uL


(140-400)


 


Neutrophils (%) (Auto) 62 % (31-73)   58 % (31-73) 


 


Lymphocytes (%) (Auto) 28 % (24-48)   30 % (24-48) 


 


Monocytes (%) (Auto) 9 % (0-9)   9 % (0-9) 


 


Eosinophils (%) (Auto) 1 % (0-3)   2 % (0-3) 


 


Basophils (%) (Auto) 0 % (0-3)   1 % (0-3) 


 


Neutrophils # (Auto)


 4.8 x10^3/uL


(1.8-7.7) 


 3.2 x10^3/uL


(1.8-7.7)


 


Lymphocytes # (Auto)


 2.2 x10^3/uL


(1.0-4.8) 


 1.7 x10^3/uL


(1.0-4.8)


 


Monocytes # (Auto)


 0.7 x10^3/uL


(0.0-1.1) 


 0.5 x10^3/uL


(0.0-1.1)


 


Eosinophils # (Auto)


 0.1 x10^3/uL


(0.0-0.7) 


 0.1 x10^3/uL


(0.0-0.7)


 


Basophils # (Auto)


 0.0 x10^3/uL


(0.0-0.2) 


 0.1 x10^3/uL


(0.0-0.2)


 


Sodium Level


 137 mmol/L


(136-145) 


 





 


Potassium Level


 3.6 mmol/L


(3.5-5.1) 


 





 


Chloride Level


 99 mmol/L


() 


 





 


Carbon Dioxide Level


 24 mmol/L


(21-32) 


 





 


Anion Gap 14 (6-14)   


 


Blood Urea Nitrogen


 10 mg/dL


(7-20) 


 





 


Creatinine


 1.1 mg/dL


(0.6-1.0) 


 





 


Estimated GFR


(Cockcroft-Gault) 50.3 


 


 





 


Glucose Level


 97 mg/dL


(70-99) 


 





 


Calcium Level


 9.6 mg/dL


(8.5-10.1) 


 





 


Total Bilirubin


 0.9 mg/dL


(0.2-1.0) 


 





 


Direct Bilirubin


 0.5 mg/dL


(0.0-0.2) 


 





 


Aspartate Amino Transf


(AST/SGOT) 281 U/L


(15-37) 


 





 


Alanine Aminotransferase


(ALT/SGPT) 450 U/L


(14-59) 


 





 


Alkaline Phosphatase


 287 U/L


() 


 





 


Troponin I Quantitative


 0.023 ng/mL


(0.000-0.055) 


 





 


Total Protein


 8.2 g/dL


(6.4-8.2) 


 





 


Albumin


 3.8 g/dL


(3.4-5.0) 


 





 


Lipase


 1910 U/L


() 


 





 


Ethyl Alcohol Level


 < 10 mg/dL


(0-10) 


 





 


Urine Collection Type  Unknown  


 


Urine Color  Marcelina  


 


Urine Clarity  Clear  


 


Urine pH  6.0 (<5.0-8.0)  


 


Urine Specific Gravity


 


 1.020


(1.000-1.030) 





 


Urine Protein


 


 Negative mg/dL


(NEG-TRACE) 





 


Urine Glucose (UA)


 


 Negative mg/dL


(NEG) 





 


Urine Ketones (Stick)


 


 Negative mg/dL


(NEG) 





 


Urine Blood  Negative (NEG)  


 


Urine Nitrite  Positive (NEG)  


 


Urine Bilirubin  Small (NEG)  


 


Urine Urobilinogen Dipstick


 


 0.2 mg/dL (0.2


mg/dL) 





 


Urine Leukocyte Esterase  Moderate (NEG)  


 


Urine RBC  Occ /HPF (0-2)  


 


Urine WBC


 


 20-40 /HPF


(0-4) 





 


Urine Squamous Epithelial


Cells 


 Mod /LPF 


 





 


Urine Bacteria


 


 Many /HPF


(0-FEW) 





 


Urine Hyaline Casts  Few /HPF  


 


Urine Mucus  Mod /LPF  


 


Urine Opiates Screen  Pos (NEG)  


 


Urine Methadone Screen  Neg (NEG)  


 


Urine Barbiturates  Neg (NEG)  


 


Urine Phencyclidine Screen  Neg (NEG)  


 


Urine


Amphetamine/Methamphetamine 


 Neg (NEG) 


 





 


Urine Benzodiazepines Screen  Neg (NEG)  


 


Urine Cocaine Screen  Neg (NEG)  


 


Urine Cannabinoids Screen  Neg (NEG)  


 


Urine Ethyl Alcohol  Neg (NEG)  











Allergies:


Coded Allergies:  


     No Known Drug Allergies (Unverified , 10/8/16)





Medications:





Current Medications








 Medications


  (Trade)  Dose


 Ordered  Sig/Kimberli


 Route


 PRN Reason  Start Time


 Stop Time Status Last Admin


Dose Admin


 


 Sodium Chloride  1,000 ml @ 


 1,000 mls/hr  Q1H


 IV


   1/20/21 20:00


 1/20/21 20:59 DC 1/20/21 20:15





 


 Sodium Chloride  1,000 ml @ 


 250 mls/hr  Q4H


 IV


   1/20/21 20:00


 1/21/21 02:05 DC 1/20/21 21:00





 


 Hydromorphone HCl


  (Dilaudid)  1 mg  1X  ONCE


 IVP


   1/20/21 20:00


 1/20/21 20:11 DC 1/20/21 20:18





 


 Hydromorphone HCl


  (Dilaudid)  1 mg  1X  ONCE


 IVP


   1/20/21 21:15


 1/20/21 21:16 DC 1/20/21 21:21





 


 Hydromorphone HCl


  (Dilaudid)  2 mg  PRN Q4HRS  PRN


 IVP


 PAIN  1/20/21 22:00


 1/20/21 23:32 DC 1/20/21 23:02





 


 Hydromorphone HCl


  (Dilaudid)  4 mg  PRN Q2HR  PRN


 IVP


 PAIN  1/20/21 23:30


    1/21/21 09:22





 


 Oxycodone HCl


  (Roxicodone)  30 mg  PRN Q4HRS  PRN


 PO


 PAIN  1/20/21 23:45


    1/21/21 00:21





 


 Lorazepam


  (Ativan Inj)  1 mg  PRN Q4HRS  PRN


 IVP


 ANXIETY / AGITATION  1/20/21 23:45


    1/21/21 07:00














Imaging:


Imaging:


CXR 1/19


IMPRESSION: No acute radiographic abnormality is seen.





C/A/P CT 1/19


Findings: Beam hardening artifact related to contrast within the left axillary, 

subclavian and brachiocephalic vein related to the contrast injection limits 

evaluation of the superior mediastinum. In oval-shaped soft tissue mass is seen 

within the anterior superior mediastinum which is difficult to evaluate. This 

may connect to the inferior thyroid gland possibly representing a substernal 

goiter.


No filling defect is seen within the major branches of either pulmonary artery. 

There is no CT evidence of pulmonary embolism. The heart is borderline enlarged.

Scattered atherosclerotic calcification of the thoracic aorta and its branches 

is noted. The thoracic aorta is tortuous but tapers normally.


Patchy perihilar infiltrates are seen involving both lower lungs no pneumothorax

or pleural effusion is seen.


Impression: There is no CT evidence of pulmonary embolism.


Images through the lung bases demonstrate minimal dependent subsegmental 

atelectasis bilaterally.


The liver parenchyma has a decreased attenuation consistent with fatty infilt

ration. Small calcified granulomas are seen scattered throughout the spleen. The

pancreas is within normal limits. Rounded low-attenuation lesions are seen 

involving both kidneys which measure 5 mm to 1 cm in size. They likely represent

cysts. No further imaging evaluation is recommended. A 7 mm nonobstructing 

calculus is seen involving the lower pole the right kidney.


Slight prominence of the body and tail of pancreas is seen. Increased density 

seen within the fat surrounding this portion of the pancreas consistent with 

acute pancreatitis. A low-attenuation lesion with a thickened wall is seen in 

the head of the pancreas which is new since previous examination. This measures 

7.8 x 6.4 x 5.4 cm in AP, transverse and craniocaudal dimensions. This likely 

represents pancreatic pseudocyst. Punctate calcifications are seen scattered 

throughout the pancreas. No additional abnormal fluid collection is seen.


Atherosclerotic calcification abdominal aorta is seen. The abdominal aorta 

tapers normally. The gallbladder is distended. No free fluid or free air is seen

within the abdomen. There is no evidence of bowel obstruction. Patient is post 

ventral hernia repair. The appendix is well-visualized and is within normal 

limits.


Images through the pelvis demonstrate the urinary bladder distended with urine. 

Surgical clips are seen within the right pelvis. No adnexal mass is seen. No 

free fluid is noted. Mild S-shaped curvature of the thoracolumbar spine is seen.

Degenerative changes are seen involving lower thoracic and throughout the lumbar

spine along with both hips. Patient is post kyphoplasty type procedure involving

the L1 vertebral body.


IMPRESSION: Findings are seen consistent with acute on chronic pancreatitis. A 

7.8 cm pancreatic pseudocyst is seen within the head of the pancreas.





RUQ US 1/20


FINDINGS:


Increased echogenicity of the liver, likely related to hepatic steatosis. 

Vascular flow identified within the vascular flow.


Gallbladder is distended with internal debris. No pericholecystic fluid or wall 

thickening. There is a sonographic Hodge sign per the department sonographer' 

s. Common bile duct is dilated measuring 1.5 cm in diameter.


Right kidney measures 11.6 cm in length. No hydronephrosis.


Visualized portions aorta and IVC are unremarkable.


IMPRESSION:


1.  Dilated gallbladder and common bile duct. An obstructing stone or lesion is 

not identified. There is a positive sonographic Hodge sign per the sonographer,

which can be seen in setting of acute cholecystitis. If there is persistent 

concern for obstruction HIDA scan would better evaluate.


2.  Hepatic steatosis.


3.  No right-sided hydronephrosis.





PE:





GEN: looks uncomfortable


HEENT: Atraumatic, PERRL


LUNGS: diminished anteriorly


HEART: RRR


ABD: quiet, epigastric to LUQ discomfort


EXTREMITY: No edema


SKIN: No rashes, no jaundice


NEURO/PSYCH: A & O 3





A/P:


A/P:


Acute on chronic pancreatitis w/ pancreatic pseudocyst - unclear etiology


Diarrhea - none here


Macrocytosis, UTI, elevated LFTs (normal bili)


Distended GB w/ debris, dilated CBD (1.5cm)


CRC screen - details of past colonoscopy unclear


Hepatic steatosis


H/o cervical cancer w/ chemo/rad


Chronic pain on oxycodone





--


D/w Dr. Andrade - MRCP would be helpful though this is usually pursued as 

outpt.  Will check .


She asks for water - okay to try some sips.


Monitor for diarrhea, check stool studies if indicated.


Empiric acid-reducer.


?hold HCTZ


Check TSH and triglycerides.


D/w Marilee/surgery.











MELY MARIEE         Jan 21, 2021 09:38

## 2021-01-21 NOTE — EKG
[facility]

              [address] [place] [address]

Test Date:    2021               Test Time:    19:53:11

Pat Name:     PAMELA DU            Department:   

Patient ID:   [id number]           Room:          

Gender:       F                        Technician:   

:          [id number]               Requested By: CASSIA CANO

Order Number: [id number]           Reading MD:     

                                 Measurements

Intervals                              Axis          

Rate:         65                       P:            32

SC:           156                      QRS:          -7

QRSD:         86                       T:            59

QT:           408                                    

QTc:          429                                    

                           Interpretive Statements

SINUS RHYTHM

LEFTWARD AXIS

T ABNORMALITY IN ANTERIOR LEADS

ABNORMAL ECG

RI6.01

No previous ECG available for comparison

## 2021-01-21 NOTE — NUR
PATIENT REMAINS NPO AT THIS TIME, ALERT AND VERBALLY RESPONSIVE, C/O IN ABDOMINAL AREA AND 
BACK, WILL GIVE PAIN MEDS IF IT IS TIME, INFORMED PATIENT THAT DR. HENRIQUEZ SHOULD SEE HER 
TODAY.

## 2021-01-21 NOTE — NUR
Called to Nuc med to give Morphine 4mg for gallbladder scan.  Pt is alert and oriented, VS 
145/85 58 96%RA.  Morphine given, follow up /83 54 94%RA, will continue to be 
monitored by Nuc med staff.  DEMARCUS MADISON 29-Feb-2020

## 2021-01-22 VITALS — SYSTOLIC BLOOD PRESSURE: 145 MMHG | DIASTOLIC BLOOD PRESSURE: 83 MMHG

## 2021-01-22 VITALS — DIASTOLIC BLOOD PRESSURE: 82 MMHG | SYSTOLIC BLOOD PRESSURE: 147 MMHG

## 2021-01-22 VITALS — DIASTOLIC BLOOD PRESSURE: 85 MMHG | SYSTOLIC BLOOD PRESSURE: 177 MMHG

## 2021-01-22 VITALS — DIASTOLIC BLOOD PRESSURE: 90 MMHG | SYSTOLIC BLOOD PRESSURE: 158 MMHG

## 2021-01-22 VITALS — DIASTOLIC BLOOD PRESSURE: 89 MMHG | SYSTOLIC BLOOD PRESSURE: 170 MMHG

## 2021-01-22 VITALS — DIASTOLIC BLOOD PRESSURE: 86 MMHG | SYSTOLIC BLOOD PRESSURE: 161 MMHG

## 2021-01-22 VITALS — SYSTOLIC BLOOD PRESSURE: 187 MMHG | DIASTOLIC BLOOD PRESSURE: 106 MMHG

## 2021-01-22 LAB
ALBUMIN SERPL-MCNC: 3.7 G/DL (ref 3.4–5)
ALP SERPL-CCNC: 236 U/L (ref 46–116)
ALT SERPL-CCNC: 296 U/L (ref 14–59)
ANION GAP SERPL CALC-SCNC: 10 MMOL/L (ref 6–14)
AST SERPL-CCNC: 97 U/L (ref 15–37)
BASOPHILS # BLD AUTO: 0.1 X10^3/UL (ref 0–0.2)
BASOPHILS NFR BLD: 1 % (ref 0–3)
BILIRUB DIRECT SERPL-MCNC: 0.4 MG/DL (ref 0–0.2)
BILIRUB SERPL-MCNC: 0.8 MG/DL (ref 0.2–1)
BUN SERPL-MCNC: 9 MG/DL (ref 7–20)
CALCIUM SERPL-MCNC: 9.8 MG/DL (ref 8.5–10.1)
CHLORIDE SERPL-SCNC: 103 MMOL/L (ref 98–107)
CO2 SERPL-SCNC: 27 MMOL/L (ref 21–32)
CREAT SERPL-MCNC: 1 MG/DL (ref 0.6–1)
EOSINOPHIL NFR BLD: 0.1 X10^3/UL (ref 0–0.7)
EOSINOPHIL NFR BLD: 1 % (ref 0–3)
ERYTHROCYTE [DISTWIDTH] IN BLOOD BY AUTOMATED COUNT: 15.6 % (ref 11.5–14.5)
GFR SERPLBLD BASED ON 1.73 SQ M-ARVRAT: 56.2 ML/MIN
GLUCOSE SERPL-MCNC: 126 MG/DL (ref 70–99)
HCT VFR BLD CALC: 41.4 % (ref 36–47)
HGB BLD-MCNC: 13.8 G/DL (ref 12–15.5)
LYMPHOCYTES # BLD: 1.7 X10^3/UL (ref 1–4.8)
LYMPHOCYTES NFR BLD AUTO: 26 % (ref 24–48)
MCH RBC QN AUTO: 34 PG (ref 25–35)
MCHC RBC AUTO-ENTMCNC: 33 G/DL (ref 31–37)
MCV RBC AUTO: 102 FL (ref 79–100)
MONO #: 0.4 X10^3/UL (ref 0–1.1)
MONOCYTES NFR BLD: 7 % (ref 0–9)
NEUT #: 4.1 X10^3/UL (ref 1.8–7.7)
NEUTROPHILS NFR BLD AUTO: 65 % (ref 31–73)
PLATELET # BLD AUTO: 325 X10^3/UL (ref 140–400)
POTASSIUM SERPL-SCNC: 4.1 MMOL/L (ref 3.5–5.1)
PROT SERPL-MCNC: 8.3 G/DL (ref 6.4–8.2)
RBC # BLD AUTO: 4.05 X10^6/UL (ref 3.5–5.4)
SODIUM SERPL-SCNC: 140 MMOL/L (ref 136–145)
WBC # BLD AUTO: 6.4 X10^3/UL (ref 4–11)

## 2021-01-22 RX ADMIN — NICOTINE SCH PATCH: 21 PATCH, EXTENDED RELEASE TOPICAL at 08:07

## 2021-01-22 RX ADMIN — Medication SCH EA: at 19:57

## 2021-01-22 RX ADMIN — HYDROMORPHONE HYDROCHLORIDE PRN MG: 2 INJECTION INTRAMUSCULAR; INTRAVENOUS; SUBCUTANEOUS at 14:45

## 2021-01-22 RX ADMIN — PROCHLORPERAZINE EDISYLATE PRN MG: 5 INJECTION INTRAMUSCULAR; INTRAVENOUS at 20:15

## 2021-01-22 RX ADMIN — HYDROMORPHONE HYDROCHLORIDE PRN MG: 2 INJECTION INTRAMUSCULAR; INTRAVENOUS; SUBCUTANEOUS at 02:46

## 2021-01-22 RX ADMIN — CYCLOBENZAPRINE HYDROCHLORIDE PRN MG: 10 TABLET, FILM COATED ORAL at 20:15

## 2021-01-22 RX ADMIN — HYDROMORPHONE HYDROCHLORIDE PRN MG: 2 INJECTION INTRAMUSCULAR; INTRAVENOUS; SUBCUTANEOUS at 18:11

## 2021-01-22 RX ADMIN — PANTOPRAZOLE SODIUM SCH MG: 40 INJECTION, POWDER, FOR SOLUTION INTRAVENOUS at 05:26

## 2021-01-22 RX ADMIN — HYDROMORPHONE HYDROCHLORIDE PRN MG: 2 INJECTION INTRAMUSCULAR; INTRAVENOUS; SUBCUTANEOUS at 11:37

## 2021-01-22 RX ADMIN — PROCHLORPERAZINE EDISYLATE PRN MG: 5 INJECTION INTRAMUSCULAR; INTRAVENOUS at 08:16

## 2021-01-22 RX ADMIN — HYDROMORPHONE HYDROCHLORIDE PRN MG: 2 INJECTION INTRAMUSCULAR; INTRAVENOUS; SUBCUTANEOUS at 20:13

## 2021-01-22 RX ADMIN — HYDROMORPHONE HYDROCHLORIDE PRN MG: 2 INJECTION INTRAMUSCULAR; INTRAVENOUS; SUBCUTANEOUS at 23:01

## 2021-01-22 RX ADMIN — HYDROMORPHONE HYDROCHLORIDE PRN MG: 2 INJECTION INTRAMUSCULAR; INTRAVENOUS; SUBCUTANEOUS at 05:08

## 2021-01-22 RX ADMIN — LEVOTHYROXINE SODIUM SCH MCG: 150 TABLET ORAL at 05:08

## 2021-01-22 RX ADMIN — HYDROMORPHONE HYDROCHLORIDE PRN MG: 2 INJECTION INTRAMUSCULAR; INTRAVENOUS; SUBCUTANEOUS at 08:07

## 2021-01-22 RX ADMIN — ENOXAPARIN SODIUM SCH MG: 40 INJECTION SUBCUTANEOUS at 22:51

## 2021-01-22 RX ADMIN — LOSARTAN POTASSIUM SCH MG: 50 TABLET ORAL at 14:44

## 2021-01-22 NOTE — PDOC
Provider Note


Date of Service:


DATE: 1/22/21 


TIME: 14:39


Provider Note


IR NOTE


Pancreatic pseudocyst.  Request for aspiration for Dx purposes. 


Will plan on monday.





Justifications for Admission


Other Justification


Acute pancreatitis











CHRISTAL RICHARDSON MD               Jan 22, 2021 14:40

## 2021-01-22 NOTE — NUR
SW following. Discussed with RN, pt from home with spouse, room air, NPO. IR consulted. 
Discussion RE MRCP inpatient vs outpatient. Med Assist following for self pay status. RN 
advised no SW needs at this time. SW will continue to follow.

## 2021-01-22 NOTE — PDOC
Date of Service:


DATE: 1/22/21 


TIME: 09:39





Subjective:


Subjective:


Just not feeling very good.


Abd pain, some nausea.  No stools.


Pain meds "not doing it."


Thankful for nicotine patch.





Objective:


Objective:


Reviewed nursing notes.


Note IR asked to see.


Vital Signs:





                                   Vital Signs








  Date Time  Temp Pulse Resp B/P (MAP) Pulse Ox O2 Delivery O2 Flow Rate FiO2


 


1/22/21 08:07   20  93 Room Air  


 


1/22/21 07:00 98.0 76  158/90 (112)    





 98.0       








Labs:





Laboratory Tests








Test


 1/22/21


07:42


 


White Blood Count 6.4 x10^3/uL 


 


Red Blood Count 4.05 x10^6/uL 


 


Hemoglobin 13.8 g/dL 


 


Hematocrit 41.4 % 


 


Mean Corpuscular Volume 102 fL 


 


Mean Corpuscular Hemoglobin 34 pg 


 


Mean Corpuscular Hemoglobin


Concent 33 g/dL 





 


Red Cell Distribution Width 15.6 % 


 


Platelet Count 325 x10^3/uL 


 


Neutrophils (%) (Auto) 65 % 


 


Lymphocytes (%) (Auto) 26 % 


 


Monocytes (%) (Auto) 7 % 


 


Eosinophils (%) (Auto) 1 % 


 


Basophils (%) (Auto) 1 % 


 


Neutrophils # (Auto) 4.1 x10^3/uL 


 


Lymphocytes # (Auto) 1.7 x10^3/uL 


 


Monocytes # (Auto) 0.4 x10^3/uL 


 


Eosinophils # (Auto) 0.1 x10^3/uL 


 


Basophils # (Auto) 0.1 x10^3/uL 


 


Sodium Level 140 mmol/L 


 


Potassium Level 4.1 mmol/L 


 


Chloride Level 103 mmol/L 


 


Carbon Dioxide Level 27 mmol/L 


 


Anion Gap 10 


 


Blood Urea Nitrogen 9 mg/dL 


 


Creatinine 1.0 mg/dL 


 


Estimated GFR


(Cockcroft-Gault) 56.2 





 


Glucose Level 126 mg/dL 


 


Calcium Level 9.8 mg/dL 








Imaging:


HIDA 1/21


IMPRESSION:


1. Slow passage of radiotracer into the small bowel but there is eventual 

passage into the small bowel without evidence of a high-grade bile duct 

obstruction. A partial obstruction of the bile ducts is not excluded given the 

slow passage.


2. The gallbladder is not visualized at the 60 minute time point but is seen 

after administration of morphine. Causes such as chronic cholecystitis are 

within the differential given this delayed visualization.





PE:





GEN: NAD - was asleep


LUNGS: diminished


HEART: RRR


ABD: quiet, soft, LUQ discomfort


NEURO/PSYCH: A & O 3





A/P:


Acute on chronic pancreatitis w/ pancreatic pseudocyst - unclear etiology


Macrocytosis, UTI, hypothyroidism, elevated LFTs


Distended GB w/ debris, dilated CBD (1.5cm) - abnormal HIDA as above


Hepatic steatosis





--


Await IR thoughts.


Will review HIDA w/ Dr. Andrade.  Asked for MRCP yesterday - hopefully can be 

done.


 pending.


Recheck LFTs.





Justicifation of Admission Dx:


Justifications for Admission:


Justification of Admission Dx:  Yes











MELY MARIEE         Jan 22, 2021 10:01

## 2021-01-22 NOTE — RAD
MRI of the abdomen without contrast to include a MRCP 1/22/2021



CLINICAL HISTORY: Pancreatitis and pseudocyst. Dilated common bile duct.



TECHNIQUE: Unenhanced T2-weighted axial and coronal, fat saturated T2-weighted axial, diffusion-weigh
hollie axial and in and out of phase T1-weighted axial images of the abdomen were obtained. Additionally
 thin section fat saturated T2-weighted coronal images of the abdomen were obtained. Multiplanar 3-D 
MIP reconstructed images of the biliary system were obtained for an MRCP.



FINDINGS: Comparison is made to the patient's CT scan of the abdomen dated 1/19/2021.



Fatty infiltration of the liver is again noted. A 1.8 cm oval-shaped area of increased signal intensi
ty is seen on the T2-weighted images involving the posterior aspect of the right lobe of the liver wh
ich demonstrates decreased signal intensity on the T1-weighted images. This likely represents a cyst.
 The spleen and adrenal glands are within normal limits. Rounded high signal intensity lesions are se
en scattered throughout both kidneys on the T2-weighted images. These measure 3 mm to 1.6 cm in size.
 They likely represent cysts. No further imaging evaluation is recommended.



A pancreatic pseudocyst is seen within the head of the pancreas which measures 7.5 x 6.4 x 5.5 cm in 
AP, transverse and craniocaudal dimensions. Mass effect related to this pancreatic pseudocyst extrins
ically compresses the proximal main pancreatic duct and the common bile duct. The mid/distal main pan
creatic duct is dilated. The common hepatic duct and left and right hepatic ducts are dilated. No add
itional pancreatic pseudocyst is noted.



The abdominal aorta tapers normally. No free fluid is seen. There is no evidence of bowel obstruction
. Mild S-shaped curvature of the thoracolumbar spine is again noted.



MRCP images demonstrate dilatation of the gallbladder and cystic duct along with the common hepatic d
uct and the left and right hepatic ducts and their proximal branches. Extrinsic mass effect from keena
ent's pseudocyst compresses the common bile duct. No cholelithiasis or choledocholithiasis is seen.



IMPRESSION: A 7.5 cm pseudocyst is seen within the head of the pancreas. Mass effect related to this 
pseudocyst compresses the proximal main pancreatic duct and the common bile duct resulting in intra a
nd extrahepatic biliary ductal dilatation and gallbladder distention. No choledocholithiasis is seen.




Electronically signed by: Mark Yoo MD (1/22/2021 1:45 PM) [id number]

## 2021-01-22 NOTE — PDOC
TEA BENTLEY APRN 1/22/21 1144:


SURGICAL PROGRESS NOTE


DATE: 1/22/21 


TIME: 11:43


Subjective


headache 


more abdominal pain


Vital Signs





Vital Signs








  Date Time  Temp Pulse Resp B/P (MAP) Pulse Ox O2 Delivery O2 Flow Rate FiO2


 


1/22/21 11:37   20  94 Room Air  


 


1/22/21 07:00 98.0 76  158/90 (112)    





 98.0       








I&O











Intake and Output 


 


 1/22/21





 07:00


 


Output Total 0 ml


 


Balance 0 ml


 


 


 


Output Urine Total 0 ml


 


# Voids 6








General:  Alert, Cooperative


Abdomen:  Soft, Other (ttp upper abdomen )


Labs





Laboratory Tests








Test


 1/20/21


20:10 1/20/21


23:40 1/21/21


09:15 1/22/21


07:42


 


White Blood Count


 7.8 x10^3/uL


(4.0-11.0) 


 5.5 x10^3/uL


(4.0-11.0) 6.4 x10^3/uL


(4.0-11.0)


 


Red Blood Count


 3.91 x10^6/uL


(3.50-5.40) 


 3.78 x10^6/uL


(3.50-5.40) 4.05 x10^6/uL


(3.50-5.40)


 


Hemoglobin


 13.6 g/dL


(12.0-15.5) 


 13.1 g/dL


(12.0-15.5) 13.8 g/dL


(12.0-15.5)


 


Hematocrit


 38.7 %


(36.0-47.0) 


 38.5 %


(36.0-47.0) 41.4 %


(36.0-47.0)


 


Mean Corpuscular Volume


 99 fL () 


 


 102 fL


() 102 fL


()


 


Mean Corpuscular Hemoglobin 35 pg (25-35)   35 pg (25-35)  34 pg (25-35) 


 


Mean Corpuscular Hemoglobin


Concent 35 g/dL


(31-37) 


 34 g/dL


(31-37) 33 g/dL


(31-37)


 


Red Cell Distribution Width


 15.5 %


(11.5-14.5) 


 15.6 %


(11.5-14.5) 15.6 %


(11.5-14.5)


 


Platelet Count


 346 x10^3/uL


(140-400) 


 285 x10^3/uL


(140-400) 325 x10^3/uL


(140-400)


 


Neutrophils (%) (Auto) 62 % (31-73)   58 % (31-73)  65 % (31-73) 


 


Lymphocytes (%) (Auto) 28 % (24-48)   30 % (24-48)  26 % (24-48) 


 


Monocytes (%) (Auto) 9 % (0-9)   9 % (0-9)  7 % (0-9) 


 


Eosinophils (%) (Auto) 1 % (0-3)   2 % (0-3)  1 % (0-3) 


 


Basophils (%) (Auto) 0 % (0-3)   1 % (0-3)  1 % (0-3) 


 


Neutrophils # (Auto)


 4.8 x10^3/uL


(1.8-7.7) 


 3.2 x10^3/uL


(1.8-7.7) 4.1 x10^3/uL


(1.8-7.7)


 


Lymphocytes # (Auto)


 2.2 x10^3/uL


(1.0-4.8) 


 1.7 x10^3/uL


(1.0-4.8) 1.7 x10^3/uL


(1.0-4.8)


 


Monocytes # (Auto)


 0.7 x10^3/uL


(0.0-1.1) 


 0.5 x10^3/uL


(0.0-1.1) 0.4 x10^3/uL


(0.0-1.1)


 


Eosinophils # (Auto)


 0.1 x10^3/uL


(0.0-0.7) 


 0.1 x10^3/uL


(0.0-0.7) 0.1 x10^3/uL


(0.0-0.7)


 


Basophils # (Auto)


 0.0 x10^3/uL


(0.0-0.2) 


 0.1 x10^3/uL


(0.0-0.2) 0.1 x10^3/uL


(0.0-0.2)


 


Sodium Level


 137 mmol/L


(136-145) 


 141 mmol/L


(136-145) 140 mmol/L


(136-145)


 


Potassium Level


 3.6 mmol/L


(3.5-5.1) 


 3.9 mmol/L


(3.5-5.1) 4.1 mmol/L


(3.5-5.1)


 


Chloride Level


 99 mmol/L


() 


 105 mmol/L


() 103 mmol/L


()


 


Carbon Dioxide Level


 24 mmol/L


(21-32) 


 26 mmol/L


(21-32) 27 mmol/L


(21-32)


 


Anion Gap 14 (6-14)   10 (6-14)  10 (6-14) 


 


Blood Urea Nitrogen


 10 mg/dL


(7-20) 


 9 mg/dL (7-20) 


 9 mg/dL (7-20) 





 


Creatinine


 1.1 mg/dL


(0.6-1.0) 


 1.0 mg/dL


(0.6-1.0) 1.0 mg/dL


(0.6-1.0)


 


Estimated GFR


(Cockcroft-Gault) 50.3 


 


 56.2 


 56.2 





 


Glucose Level


 97 mg/dL


(70-99) 


 104 mg/dL


(70-99) 126 mg/dL


(70-99)


 


Calcium Level


 9.6 mg/dL


(8.5-10.1) 


 8.5 mg/dL


(8.5-10.1) 9.8 mg/dL


(8.5-10.1)


 


Total Bilirubin


 0.9 mg/dL


(0.2-1.0) 


 


 0.8 mg/dL


(0.2-1.0)


 


Direct Bilirubin


 0.5 mg/dL


(0.0-0.2) 


 


 0.4 mg/dL


(0.0-0.2)


 


Aspartate Amino Transf


(AST/SGOT) 281 U/L


(15-37) 


 


 97 U/L (15-37) 





 


Alanine Aminotransferase


(ALT/SGPT) 450 U/L


(14-59) 


 


 296 U/L


(14-59)


 


Alkaline Phosphatase


 287 U/L


() 


 


 236 U/L


()


 


Troponin I Quantitative


 0.023 ng/mL


(0.000-0.055) 


 


 





 


Total Protein


 8.2 g/dL


(6.4-8.2) 


 


 8.3 g/dL


(6.4-8.2)


 


Albumin


 3.8 g/dL


(3.4-5.0) 


 


 3.7 g/dL


(3.4-5.0)


 


Lipase


 1910 U/L


() 


 


 





 


Ethyl Alcohol Level


 < 10 mg/dL


(0-10) 


 


 





 


Urine Collection Type  Unknown   


 


Urine Color  Marcelina   


 


Urine Clarity  Clear   


 


Urine pH  6.0 (<5.0-8.0)   


 


Urine Specific Gravity


 


 1.020


(1.000-1.030) 


 





 


Urine Protein


 


 Negative mg/dL


(NEG-TRACE) 


 





 


Urine Glucose (UA)


 


 Negative mg/dL


(NEG) 


 





 


Urine Ketones (Stick)


 


 Negative mg/dL


(NEG) 


 





 


Urine Blood  Negative (NEG)   


 


Urine Nitrite  Positive (NEG)   


 


Urine Bilirubin  Small (NEG)   


 


Urine Urobilinogen Dipstick


 


 0.2 mg/dL (0.2


mg/dL) 


 





 


Urine Leukocyte Esterase  Moderate (NEG)   


 


Urine RBC  Occ /HPF (0-2)   


 


Urine WBC


 


 20-40 /HPF


(0-4) 


 





 


Urine Squamous Epithelial


Cells 


 Mod /LPF 


 


 





 


Urine Bacteria


 


 Many /HPF


(0-FEW) 


 





 


Urine Hyaline Casts  Few /HPF   


 


Urine Mucus  Mod /LPF   


 


Urine Opiates Screen  Pos (NEG)   


 


Urine Methadone Screen  Neg (NEG)   


 


Urine Barbiturates  Neg (NEG)   


 


Urine Phencyclidine Screen  Neg (NEG)   


 


Urine


Amphetamine/Methamphetamine 


 Neg (NEG) 


 


 





 


Urine Benzodiazepines Screen  Neg (NEG)   


 


Urine Cocaine Screen  Neg (NEG)   


 


Urine Cannabinoids Screen  Neg (NEG)   


 


Urine Ethyl Alcohol  Neg (NEG)   


 


C-Reactive Protein,


Quantitative 


 


 4.0 mg/L


(0-3.3) 





 


Triglycerides Level


 


 


 117 mg/dL


(0-150) 





 


Thyroid Stimulating Hormone


(TSH) 


 


 63.893 uIU/mL


(0.358-3.74) 











Laboratory Tests








Test


 1/22/21


07:42


 


White Blood Count


 6.4 x10^3/uL


(4.0-11.0)


 


Red Blood Count


 4.05 x10^6/uL


(3.50-5.40)


 


Hemoglobin


 13.8 g/dL


(12.0-15.5)


 


Hematocrit


 41.4 %


(36.0-47.0)


 


Mean Corpuscular Volume


 102 fL


()


 


Mean Corpuscular Hemoglobin 34 pg (25-35) 


 


Mean Corpuscular Hemoglobin


Concent 33 g/dL


(31-37)


 


Red Cell Distribution Width


 15.6 %


(11.5-14.5)


 


Platelet Count


 325 x10^3/uL


(140-400)


 


Neutrophils (%) (Auto) 65 % (31-73) 


 


Lymphocytes (%) (Auto) 26 % (24-48) 


 


Monocytes (%) (Auto) 7 % (0-9) 


 


Eosinophils (%) (Auto) 1 % (0-3) 


 


Basophils (%) (Auto) 1 % (0-3) 


 


Neutrophils # (Auto)


 4.1 x10^3/uL


(1.8-7.7)


 


Lymphocytes # (Auto)


 1.7 x10^3/uL


(1.0-4.8)


 


Monocytes # (Auto)


 0.4 x10^3/uL


(0.0-1.1)


 


Eosinophils # (Auto)


 0.1 x10^3/uL


(0.0-0.7)


 


Basophils # (Auto)


 0.1 x10^3/uL


(0.0-0.2)


 


Sodium Level


 140 mmol/L


(136-145)


 


Potassium Level


 4.1 mmol/L


(3.5-5.1)


 


Chloride Level


 103 mmol/L


()


 


Carbon Dioxide Level


 27 mmol/L


(21-32)


 


Anion Gap 10 (6-14) 


 


Blood Urea Nitrogen 9 mg/dL (7-20) 


 


Creatinine


 1.0 mg/dL


(0.6-1.0)


 


Estimated GFR


(Cockcroft-Gault) 56.2 





 


Glucose Level


 126 mg/dL


(70-99)


 


Calcium Level


 9.8 mg/dL


(8.5-10.1)


 


Total Bilirubin


 0.8 mg/dL


(0.2-1.0)


 


Direct Bilirubin


 0.4 mg/dL


(0.0-0.2)


 


Aspartate Amino Transf


(AST/SGOT) 97 U/L (15-37) 





 


Alanine Aminotransferase


(ALT/SGPT) 296 U/L


(14-59)


 


Alkaline Phosphatase


 236 U/L


()


 


Total Protein


 8.3 g/dL


(6.4-8.2)


 


Albumin


 3.7 g/dL


(3.4-5.0)








Problem List


Problems


Medical Problems:


(1) Acute on chronic pancreatitis


Status: Acute  





(2) Right upper quadrant abdominal pain with positive Hodge's Sign


Status: Acute  





(3) Transaminitis


Status: Acute  








Assessment/Plan


pancreatitis





MRCP pending


IR consult pending for possible sampling


GI note reviewed





Justicifation of Admission Dx:


Justifications for Admission:


Justification of Admission Dx:  Yes





MEKHI LEONARD MD 1/22/21 1228:


SURGICAL PROGRESS NOTE


Assessment/Plan


Above noted;  HIDA showed gallbladder filling after morphine administration;  

MRCP ordered by GI;  continue with supportive tx for pancreatitis;  no surgical 

plans at this time











TEA BENTLEY            Jan 22, 2021 11:44


MEKHI LEONARD MD             Jan 22, 2021 12:28

## 2021-01-22 NOTE — PDOC
[facility] PROGRESS NOTE


Date of Service


DOS:


DATE: 1/22/21 


TIME: 06:46





Chief Complaint


Chief Complaint


Acute on chronic pancreatitis


Pancreatic pseudocyst


Dilated gallbladder vs cholecystitis


Transaminitis





Plan:


NPO; continue IV fluids at 250cc


Consult to GI


Follow urine output


Consult general surgery


No gallstones seen on ultrasound; will defer to general surgery for 

recommendations on HIDA scan


Dilaudid IV as needed for pain


Resume home medications


FEN - NPO


PPX  - Lovenox


FULL CODE


Dispo - inpatient for above





History of Present Illness


History of Present Illness


Patient 60-year-old female presents to the ER with complaint of left upper quadr

ant abdominal pain.  She was seen in the ED yesterday for similar symptoms, but 

left AMA.  CT abdomen/pelvis at that time showed acute on chronic hepatitis with

7.8 cm pancreatic pseudocyst and distended gallbladder.  She returned to the ER 

today the behest of her PCP.  She reports abdominal pain, 10/10.





1/22: HIDA scan yesterday showing slow passage of radiotracer into the small 

bowel without evidence of a high-grade bile duct obstruction.  Continue 

treatment of pancreatitis with bowel rest and IV fluids.  Follow for resolution 

of pancreatic pseudocyst.  Will consult IR to see about sampling pancreatic 

pseudocyst.  She is scheduled for MRCP today.  Patient does admits to improperly

taking her levothyroxine with her other home medications.  Discussed taking 

levothyroxine as first medication in the morning at least 1 hour prior to any 

other food or medications.





Vitals/I&O


Vitals/I&O:





                                   Vital Signs








  Date Time  Temp Pulse Resp B/P (MAP) Pulse Ox O2 Delivery O2 Flow Rate FiO2


 


1/22/21 05:38   18  100 Room Air  


 


1/22/21 04:50  73  161/86 (111)    


 


1/22/21 03:06 98.0       





 98.0       














                                    I & O   


 


 1/21/21 1/21/21 1/22/21





 15:00 23:00 07:00


 


Output Total   0 ml


 


Balance   0 ml











Physical Exam


General:  Alert, Oriented X3, Cooperative, mild distress


Heart:  Regular rate, Normal S1, Normal S2


Lungs:  Clear


Abdomen:  Soft, Other (ttp epigastric, LUQ, midline scar)


Extremities:  No clubbing, No cyanosis


Skin:  No rashes, No breakdown





Labs


Labs:





Laboratory Tests








Test


 1/21/21


09:15


 


White Blood Count


 5.5 x10^3/uL


(4.0-11.0)


 


Red Blood Count


 3.78 x10^6/uL


(3.50-5.40)


 


Hemoglobin


 13.1 g/dL


(12.0-15.5)


 


Hematocrit


 38.5 %


(36.0-47.0)


 


Mean Corpuscular Volume


 102 fL


()


 


Mean Corpuscular Hemoglobin 35 pg (25-35) 


 


Mean Corpuscular Hemoglobin


Concent 34 g/dL


(31-37)


 


Red Cell Distribution Width


 15.6 %


(11.5-14.5)


 


Platelet Count


 285 x10^3/uL


(140-400)


 


Neutrophils (%) (Auto) 58 % (31-73) 


 


Lymphocytes (%) (Auto) 30 % (24-48) 


 


Monocytes (%) (Auto) 9 % (0-9) 


 


Eosinophils (%) (Auto) 2 % (0-3) 


 


Basophils (%) (Auto) 1 % (0-3) 


 


Neutrophils # (Auto)


 3.2 x10^3/uL


(1.8-7.7)


 


Lymphocytes # (Auto)


 1.7 x10^3/uL


(1.0-4.8)


 


Monocytes # (Auto)


 0.5 x10^3/uL


(0.0-1.1)


 


Eosinophils # (Auto)


 0.1 x10^3/uL


(0.0-0.7)


 


Basophils # (Auto)


 0.1 x10^3/uL


(0.0-0.2)


 


Sodium Level


 141 mmol/L


(136-145)


 


Potassium Level


 3.9 mmol/L


(3.5-5.1)


 


Chloride Level


 105 mmol/L


()


 


Carbon Dioxide Level


 26 mmol/L


(21-32)


 


Anion Gap 10 (6-14) 


 


Blood Urea Nitrogen 9 mg/dL (7-20) 


 


Creatinine


 1.0 mg/dL


(0.6-1.0)


 


Estimated GFR


(Cockcroft-Gault) 56.2 





 


Glucose Level


 104 mg/dL


(70-99)


 


Calcium Level


 8.5 mg/dL


(8.5-10.1)


 


C-Reactive Protein,


Quantitative 4.0 mg/L


(0-3.3)


 


Triglycerides Level


 117 mg/dL


(0-150)


 


Thyroid Stimulating Hormone


(TSH) 63.893 uIU/mL


(0.358-3.74)











Assessment and Plan


Assessmemt and Plan


Problems


Medical Problems:


(1) Acute on chronic pancreatitis


Status: Acute  





(2) Right upper quadrant abdominal pain with positive Hodge's Sign


Status: Acute  





(3) Transaminitis


Status: Acute  











Comment


Review of Relevant


I have reviewed the following items raffi (where applicable) has been applied.


Medications:





Current Medications








 Medications


  (Trade)  Dose


 Ordered  Sig/Kimberli


 Route


 PRN Reason  Start Time


 Stop Time Status Last Admin


Dose Admin


 


 Pantoprazole


 Sodium


  (PROTONIX VIAL


 for IV PUSH)  40 mg  DAILYAC


 IVP


   1/21/21 10:45


    1/22/21 05:26





 


 Morphine Sulfate


  (Morphine


 Sulfate)  4 mg  1X  ONCE


 IV


   1/21/21 14:45


 1/21/21 14:46 DC 1/21/21 14:55





 


 Nicotine


  (Nicoderm Cq


 21mg)  1 patch  DAILY


 TD


   1/21/21 17:00


    1/21/21 16:12





 


 Hydromorphone HCl


  (Dilaudid)  2 mg  PRN Q2HR  PRN


 IVP


 MODERATE TO SEVERE PAIN  1/21/21 20:45


    1/22/21 05:08





 


 Hydralazine HCl


  (Apresoline Inj)  10 mg  PRN Q4HRS  PRN


 IVP


 ELEVATED BP, SEE COMMENTS  1/22/21 03:15


    1/22/21 03:26














Justifications for Admission


Other Justification


Acute pancreatitis











MILTON BERRIOS MD            Jan 22, 2021 07:16

## 2021-01-23 VITALS — SYSTOLIC BLOOD PRESSURE: 108 MMHG | DIASTOLIC BLOOD PRESSURE: 72 MMHG

## 2021-01-23 VITALS — DIASTOLIC BLOOD PRESSURE: 63 MMHG | SYSTOLIC BLOOD PRESSURE: 121 MMHG

## 2021-01-23 VITALS — SYSTOLIC BLOOD PRESSURE: 140 MMHG | DIASTOLIC BLOOD PRESSURE: 62 MMHG

## 2021-01-23 VITALS — DIASTOLIC BLOOD PRESSURE: 60 MMHG | SYSTOLIC BLOOD PRESSURE: 128 MMHG

## 2021-01-23 VITALS — SYSTOLIC BLOOD PRESSURE: 136 MMHG | DIASTOLIC BLOOD PRESSURE: 79 MMHG

## 2021-01-23 VITALS — SYSTOLIC BLOOD PRESSURE: 136 MMHG | DIASTOLIC BLOOD PRESSURE: 78 MMHG

## 2021-01-23 LAB
ANION GAP SERPL CALC-SCNC: 9 MMOL/L (ref 6–14)
BASOPHILS # BLD AUTO: 0.1 X10^3/UL (ref 0–0.2)
BASOPHILS NFR BLD: 1 % (ref 0–3)
BUN SERPL-MCNC: 7 MG/DL (ref 7–20)
CALCIUM SERPL-MCNC: 9.9 MG/DL (ref 8.5–10.1)
CHLORIDE SERPL-SCNC: 103 MMOL/L (ref 98–107)
CO2 SERPL-SCNC: 28 MMOL/L (ref 21–32)
CREAT SERPL-MCNC: 1 MG/DL (ref 0.6–1)
EOSINOPHIL NFR BLD: 0.1 X10^3/UL (ref 0–0.7)
EOSINOPHIL NFR BLD: 1 % (ref 0–3)
ERYTHROCYTE [DISTWIDTH] IN BLOOD BY AUTOMATED COUNT: 16 % (ref 11.5–14.5)
GFR SERPLBLD BASED ON 1.73 SQ M-ARVRAT: 56.2 ML/MIN
GLUCOSE SERPL-MCNC: 101 MG/DL (ref 70–99)
HCT VFR BLD CALC: 41.1 % (ref 36–47)
HGB BLD-MCNC: 14 G/DL (ref 12–15.5)
LYMPHOCYTES # BLD: 1.4 X10^3/UL (ref 1–4.8)
LYMPHOCYTES NFR BLD AUTO: 27 % (ref 24–48)
MCH RBC QN AUTO: 35 PG (ref 25–35)
MCHC RBC AUTO-ENTMCNC: 34 G/DL (ref 31–37)
MCV RBC AUTO: 102 FL (ref 79–100)
MONO #: 0.5 X10^3/UL (ref 0–1.1)
MONOCYTES NFR BLD: 9 % (ref 0–9)
NEUT #: 3.3 X10^3/UL (ref 1.8–7.7)
NEUTROPHILS NFR BLD AUTO: 62 % (ref 31–73)
PLATELET # BLD AUTO: 281 X10^3/UL (ref 140–400)
POTASSIUM SERPL-SCNC: 3.9 MMOL/L (ref 3.5–5.1)
RBC # BLD AUTO: 4.03 X10^6/UL (ref 3.5–5.4)
SODIUM SERPL-SCNC: 140 MMOL/L (ref 136–145)
WBC # BLD AUTO: 5.3 X10^3/UL (ref 4–11)

## 2021-01-23 RX ADMIN — HYDROMORPHONE HYDROCHLORIDE PRN MG: 2 INJECTION INTRAMUSCULAR; INTRAVENOUS; SUBCUTANEOUS at 22:44

## 2021-01-23 RX ADMIN — NICOTINE SCH PATCH: 21 PATCH, EXTENDED RELEASE TOPICAL at 08:27

## 2021-01-23 RX ADMIN — LOSARTAN POTASSIUM SCH MG: 50 TABLET ORAL at 08:25

## 2021-01-23 RX ADMIN — LEVOTHYROXINE SODIUM SCH MCG: 150 TABLET ORAL at 06:14

## 2021-01-23 RX ADMIN — ENOXAPARIN SODIUM SCH MG: 40 INJECTION SUBCUTANEOUS at 22:42

## 2021-01-23 RX ADMIN — CYCLOBENZAPRINE HYDROCHLORIDE PRN MG: 10 TABLET, FILM COATED ORAL at 21:30

## 2021-01-23 RX ADMIN — Medication SCH EA: at 19:57

## 2021-01-23 RX ADMIN — HYDROMORPHONE HYDROCHLORIDE PRN MG: 2 INJECTION INTRAMUSCULAR; INTRAVENOUS; SUBCUTANEOUS at 06:14

## 2021-01-23 RX ADMIN — HYDROMORPHONE HYDROCHLORIDE PRN MG: 2 INJECTION INTRAMUSCULAR; INTRAVENOUS; SUBCUTANEOUS at 13:25

## 2021-01-23 RX ADMIN — HYDROMORPHONE HYDROCHLORIDE PRN MG: 2 INJECTION INTRAMUSCULAR; INTRAVENOUS; SUBCUTANEOUS at 17:05

## 2021-01-23 RX ADMIN — PANTOPRAZOLE SODIUM SCH MG: 40 INJECTION, POWDER, FOR SOLUTION INTRAVENOUS at 08:25

## 2021-01-23 RX ADMIN — HYDROMORPHONE HYDROCHLORIDE PRN MG: 2 INJECTION INTRAMUSCULAR; INTRAVENOUS; SUBCUTANEOUS at 03:02

## 2021-01-23 RX ADMIN — HYDROMORPHONE HYDROCHLORIDE PRN MG: 2 INJECTION INTRAMUSCULAR; INTRAVENOUS; SUBCUTANEOUS at 19:24

## 2021-01-23 RX ADMIN — CYCLOBENZAPRINE HYDROCHLORIDE PRN MG: 10 TABLET, FILM COATED ORAL at 13:25

## 2021-01-23 RX ADMIN — HYDROMORPHONE HYDROCHLORIDE PRN MG: 2 INJECTION INTRAMUSCULAR; INTRAVENOUS; SUBCUTANEOUS at 08:27

## 2021-01-23 RX ADMIN — HYDROMORPHONE HYDROCHLORIDE PRN MG: 2 INJECTION INTRAMUSCULAR; INTRAVENOUS; SUBCUTANEOUS at 10:57

## 2021-01-23 NOTE — PDOC
G I PROGRESS NOTE


Subjective


Some abdominal discomfort, downplays.  No major complaints.  Would like more 

food.


Objective


No issues reported by staff.


Physical Exam


Lungs clear.


RRR


Abdomen soft, minimally tender.


Review of Relevant


I have reviewed the following items raffi (where applicable) has been applied.


Labs





Laboratory Tests








Test


 1/22/21


07:42 1/23/21


08:01


 


White Blood Count


 6.4 x10^3/uL


(4.0-11.0) 5.3 x10^3/uL


(4.0-11.0)


 


Red Blood Count


 4.05 x10^6/uL


(3.50-5.40) 4.03 x10^6/uL


(3.50-5.40)


 


Hemoglobin


 13.8 g/dL


(12.0-15.5) 14.0 g/dL


(12.0-15.5)


 


Hematocrit


 41.4 %


(36.0-47.0) 41.1 %


(36.0-47.0)


 


Mean Corpuscular Volume


 102 fL


() 102 fL


()


 


Mean Corpuscular Hemoglobin 34 pg (25-35)  35 pg (25-35) 


 


Mean Corpuscular Hemoglobin


Concent 33 g/dL


(31-37) 34 g/dL


(31-37)


 


Red Cell Distribution Width


 15.6 %


(11.5-14.5) 16.0 %


(11.5-14.5)


 


Platelet Count


 325 x10^3/uL


(140-400) 281 x10^3/uL


(140-400)


 


Neutrophils (%) (Auto) 65 % (31-73)  62 % (31-73) 


 


Lymphocytes (%) (Auto) 26 % (24-48)  27 % (24-48) 


 


Monocytes (%) (Auto) 7 % (0-9)  9 % (0-9) 


 


Eosinophils (%) (Auto) 1 % (0-3)  1 % (0-3) 


 


Basophils (%) (Auto) 1 % (0-3)  1 % (0-3) 


 


Neutrophils # (Auto)


 4.1 x10^3/uL


(1.8-7.7) 3.3 x10^3/uL


(1.8-7.7)


 


Lymphocytes # (Auto)


 1.7 x10^3/uL


(1.0-4.8) 1.4 x10^3/uL


(1.0-4.8)


 


Monocytes # (Auto)


 0.4 x10^3/uL


(0.0-1.1) 0.5 x10^3/uL


(0.0-1.1)


 


Eosinophils # (Auto)


 0.1 x10^3/uL


(0.0-0.7) 0.1 x10^3/uL


(0.0-0.7)


 


Basophils # (Auto)


 0.1 x10^3/uL


(0.0-0.2) 0.1 x10^3/uL


(0.0-0.2)


 


Sodium Level


 140 mmol/L


(136-145) 140 mmol/L


(136-145)


 


Potassium Level


 4.1 mmol/L


(3.5-5.1) 3.9 mmol/L


(3.5-5.1)


 


Chloride Level


 103 mmol/L


() 103 mmol/L


()


 


Carbon Dioxide Level


 27 mmol/L


(21-32) 28 mmol/L


(21-32)


 


Anion Gap 10 (6-14)  9 (6-14) 


 


Blood Urea Nitrogen 9 mg/dL (7-20)  7 mg/dL (7-20) 


 


Creatinine


 1.0 mg/dL


(0.6-1.0) 1.0 mg/dL


(0.6-1.0)


 


Estimated GFR


(Cockcroft-Gault) 56.2 


 56.2 





 


Glucose Level


 126 mg/dL


(70-99) 101 mg/dL


(70-99)


 


Calcium Level


 9.8 mg/dL


(8.5-10.1) 9.9 mg/dL


(8.5-10.1)


 


Total Bilirubin


 0.8 mg/dL


(0.2-1.0) 





 


Direct Bilirubin


 0.4 mg/dL


(0.0-0.2) 





 


Aspartate Amino Transf


(AST/SGOT) 97 U/L (15-37) 


 





 


Alanine Aminotransferase


(ALT/SGPT) 296 U/L


(14-59) 





 


Alkaline Phosphatase


 236 U/L


() 





 


Total Protein


 8.3 g/dL


(6.4-8.2) 





 


Albumin


 3.7 g/dL


(3.4-5.0) 











Laboratory Tests








Test


 1/23/21


08:01


 


White Blood Count


 5.3 x10^3/uL


(4.0-11.0)


 


Red Blood Count


 4.03 x10^6/uL


(3.50-5.40)


 


Hemoglobin


 14.0 g/dL


(12.0-15.5)


 


Hematocrit


 41.1 %


(36.0-47.0)


 


Mean Corpuscular Volume


 102 fL


()


 


Mean Corpuscular Hemoglobin 35 pg (25-35) 


 


Mean Corpuscular Hemoglobin


Concent 34 g/dL


(31-37)


 


Red Cell Distribution Width


 16.0 %


(11.5-14.5)


 


Platelet Count


 281 x10^3/uL


(140-400)


 


Neutrophils (%) (Auto) 62 % (31-73) 


 


Lymphocytes (%) (Auto) 27 % (24-48) 


 


Monocytes (%) (Auto) 9 % (0-9) 


 


Eosinophils (%) (Auto) 1 % (0-3) 


 


Basophils (%) (Auto) 1 % (0-3) 


 


Neutrophils # (Auto)


 3.3 x10^3/uL


(1.8-7.7)


 


Lymphocytes # (Auto)


 1.4 x10^3/uL


(1.0-4.8)


 


Monocytes # (Auto)


 0.5 x10^3/uL


(0.0-1.1)


 


Eosinophils # (Auto)


 0.1 x10^3/uL


(0.0-0.7)


 


Basophils # (Auto)


 0.1 x10^3/uL


(0.0-0.2)


 


Sodium Level


 140 mmol/L


(136-145)


 


Potassium Level


 3.9 mmol/L


(3.5-5.1)


 


Chloride Level


 103 mmol/L


()


 


Carbon Dioxide Level


 28 mmol/L


(21-32)


 


Anion Gap 9 (6-14) 


 


Blood Urea Nitrogen 7 mg/dL (7-20) 


 


Creatinine


 1.0 mg/dL


(0.6-1.0)


 


Estimated GFR


(Cockcroft-Gault) 56.2 





 


Glucose Level


 101 mg/dL


(70-99)


 


Calcium Level


 9.9 mg/dL


(8.5-10.1)








Microbiology


1/21/21 Urine Culture - Final, Complete


          


1/21/21 Antimicrobic Susceptibility - Final, Complete


          








CEA returns at 75


Vitals/I & O





Vital Sign - Last 24 Hours








 1/22/21 1/22/21 1/22/21 1/22/21





 12:10 14:43 14:44 14:45


 


Pulse  72 72 


 


Resp 19   20


 


B/P (MAP)  177/85 177/85 


 


Pulse Ox 94   94


 


O2 Delivery Room Air   Room Air





 1/22/21 1/22/21 1/22/21 1/22/21





 15:00 15:15 16:39 17:40


 


Temp 97.9   





 97.9   


 


Pulse 79   


 


Resp 18 20 20 20


 


B/P (MAP) 170/89 (116)   


 


Pulse Ox 96 94 94 94


 


O2 Delivery Room Air Room Air Room Air Room Air


 


    





    





 1/22/21 1/22/21 1/22/21 1/22/21





 18:11 19:00 19:46 20:10


 


Temp  98.2  





  98.2  


 


Pulse  64  


 


Resp 19 20 18 


 


B/P (MAP)  147/82 (103)  


 


Pulse Ox 94 95 94 


 


O2 Delivery Room Air Room Air Room Air Room Air


 


    





    





 1/22/21 1/22/21 1/22/21 1/22/21





 20:13 20:51 21:18 22:50


 


Resp 18 18 18 18


 


Pulse Ox 94 94 94 94


 


O2 Delivery Room Air Room Air Room Air Room Air





 1/22/21 1/22/21 1/23/21 1/23/21





 23:01 23:14 00:51 03:02


 


Temp  98.4  





  98.4  


 


Pulse  69  


 


Resp 18 20 17 18


 


B/P (MAP)  145/83 (103)  


 


Pulse Ox 94 98 98 98


 


O2 Delivery Room Air Room Air Room Air Room Air


 


    





    





 1/23/21 1/23/21 1/23/21 1/23/21





 03:07 03:50 04:26 06:02


 


Temp 98.4   





 98.4   


 


Pulse 67   


 


Resp 20 18 18 17


 


B/P (MAP) 128/60 (82)   


 


Pulse Ox 97 97 97 97


 


O2 Delivery Room Air Room Air Room Air Room Air


 


    





    





 1/23/21 1/23/21 1/23/21 1/23/21





 06:14 07:30 07:59 08:00


 


Temp   97.7 





   97.7 


 


Pulse   71 


 


Resp 18 18 20 


 


B/P (MAP)   136/79 (98) 


 


Pulse Ox 97 97 98 


 


O2 Delivery Room Air Room Air Room Air Room Air


 


    





    





 1/23/21 1/23/21 1/23/21 1/23/21





 08:25 08:26 08:27 09:32


 


B/P (MAP) 136/57 136/57  


 


O2 Delivery   Room Air Room Air





 1/23/21 1/23/21 1/23/21 





 09:34 10:56 10:57 


 


O2 Delivery Room Air Room Air Room Air 














Intake and Output   


 


 1/22/21 1/22/21 1/23/21





 15:00 23:00 07:00


 


Intake Total  240 ml 420 ml


 


Balance  240 ml 420 ml








Problem List


Problems


Medical Problems:


(1) Acute on chronic pancreatitis


Status: Acute  





(2) Right upper quadrant abdominal pain with positive Hodge's Sign


Status: Acute  





(3) Transaminitis


Status: Acute  





Assessment


Cystic pancreatic lesion with CBD compression; seemingly no intrinsic stricture.

 Elevated CEA not diagnostic of malignancy, but raises concern.


Plan of Care Note


Cautiously advance diet some.


Await cyst aspiration Monday.





Justicifation of Admission Dx:


Justifications for Admission:


Justification of Admission Dx:  Yes











HEATHER HENRIQUEZ MD          Jan 23, 2021 11:48

## 2021-01-23 NOTE — PDOC
[facility] PROGRESS NOTE


Date of Service


DOS:


DATE: 1/23/21 


TIME: 16:31





Chief Complaint


Chief Complaint


Acute on chronic pancreatitis


Pancreatic pseudocyst


Dilated gallbladder vs cholecystitis


Transaminitis





Plan:


NPO; continue IV fluids at 250cc


Consult to GI


Follow urine output


Consult general surgery


No gallstones seen on ultrasound; will defer to general surgery for 

recommendations on HIDA scan


Dilaudid IV as needed for pain


Resume home medications


FEN - NPO


PPX  - Lovenox


FULL CODE


Dispo - inpatient for above





History of Present Illness


History of Present Illness


Patient 60-year-old female presents to the ER with complaint of left upper quadr

ant abdominal pain.  She was seen in the ED yesterday for similar symptoms, but 

left AMA.  CT abdomen/pelvis at that time showed acute on chronic hepatitis with

7.8 cm pancreatic pseudocyst and distended gallbladder.  She returned to the ER 

today the behest of her PCP.  She reports abdominal pain, 10/10.





1/23: Discussed results MRCP with patient.  She has 7.5 cm pseudocyst is seen 

within the head of the pancreas, mass effect compressing the proximal main 

pancreatic duct and the common bile duct resulting in intra and extrahepatic 

biliary ductal dilatation and gallbladder distention and consulted 

interventional radiology to try to biopsy and aspirate pseudocyst.  This will 

take place Monday.  Patient reports 8/10 abdominal pain, requesting regular 

diet.  Discussed this would not be consistent with appropriate medical 

management pancreatitis.  Continue full liquid diet.





1/22: HIDA scan yesterday showing slow passage of radiotracer into the small 

bowel without evidence of a high-grade bile duct obstruction.  Continue 

treatment of pancreatitis with bowel rest and IV fluids.  Follow for resolution 

of pancreatic pseudocyst.  Will consult IR to see about sampling pancreatic 

pseudocyst.  She is scheduled for MRCP today.  Patient does admits to improperly

taking her levothyroxine with her other home medications.  Discussed taking 

levothyroxine as first medication in the morning at least 1 hour prior to any 

other food or medications.





Vitals/I&O


Vitals/I&O:





                                   Vital Signs








  Date Time  Temp Pulse Resp B/P (MAP) Pulse Ox O2 Delivery O2 Flow Rate FiO2


 


1/23/21 15:59      Room Air  


 


1/23/21 14:00     98   


 


1/23/21 11:59 97.7 82 20 108/72 (84)    





 97.7       














                                    I & O   


 


 1/22/21 1/22/21 1/23/21





 15:00 23:00 07:00


 


Intake Total  240 ml 420 ml


 


Balance  240 ml 420 ml











Physical Exam


General:  Alert, Oriented X3, Cooperative, mild distress


Heart:  Regular rate, Normal S1, Normal S2


Lungs:  Clear


Abdomen:  Normal bowel sounds, Soft, Other (Tender to the epigastrium)


Extremities:  No clubbing, No cyanosis


Skin:  No rashes, No breakdown





Labs


Labs:





Laboratory Tests








Test


 1/23/21


08:01


 


White Blood Count


 5.3 x10^3/uL


(4.0-11.0)


 


Red Blood Count


 4.03 x10^6/uL


(3.50-5.40)


 


Hemoglobin


 14.0 g/dL


(12.0-15.5)


 


Hematocrit


 41.1 %


(36.0-47.0)


 


Mean Corpuscular Volume


 102 fL


()


 


Mean Corpuscular Hemoglobin 35 pg (25-35) 


 


Mean Corpuscular Hemoglobin


Concent 34 g/dL


(31-37)


 


Red Cell Distribution Width


 16.0 %


(11.5-14.5)


 


Platelet Count


 281 x10^3/uL


(140-400)


 


Neutrophils (%) (Auto) 62 % (31-73) 


 


Lymphocytes (%) (Auto) 27 % (24-48) 


 


Monocytes (%) (Auto) 9 % (0-9) 


 


Eosinophils (%) (Auto) 1 % (0-3) 


 


Basophils (%) (Auto) 1 % (0-3) 


 


Neutrophils # (Auto)


 3.3 x10^3/uL


(1.8-7.7)


 


Lymphocytes # (Auto)


 1.4 x10^3/uL


(1.0-4.8)


 


Monocytes # (Auto)


 0.5 x10^3/uL


(0.0-1.1)


 


Eosinophils # (Auto)


 0.1 x10^3/uL


(0.0-0.7)


 


Basophils # (Auto)


 0.1 x10^3/uL


(0.0-0.2)


 


Sodium Level


 140 mmol/L


(136-145)


 


Potassium Level


 3.9 mmol/L


(3.5-5.1)


 


Chloride Level


 103 mmol/L


()


 


Carbon Dioxide Level


 28 mmol/L


(21-32)


 


Anion Gap 9 (6-14) 


 


Blood Urea Nitrogen 7 mg/dL (7-20) 


 


Creatinine


 1.0 mg/dL


(0.6-1.0)


 


Estimated GFR


(Cockcroft-Gault) 56.2 





 


Glucose Level


 101 mg/dL


(70-99)


 


Calcium Level


 9.9 mg/dL


(8.5-10.1)











Assessment and Plan


Assessmemt and Plan


Problems


Medical Problems:


(1) Acute on chronic pancreatitis


Status: Acute  





(2) Right upper quadrant abdominal pain with positive Hodge's Sign


Status: Acute  





(3) Transaminitis


Status: Acute  











Comment


Review of Relevant


I have reviewed the following items raffi (where applicable) has been applied.





Justifications for Admission


Other Justification


Acute pancreatitis











MILTON BERRIOS MD            Jan 23, 2021 16:35

## 2021-01-23 NOTE — PDOC
SURGICAL PROGRESS NOTE


DATE: 1/23/21 


TIME: 07:57


Subjective


Patient resting comfortably does state she has some abdominal pain has improved


Vital Signs





Vital Signs








  Date Time  Temp Pulse Resp B/P (MAP) Pulse Ox O2 Delivery O2 Flow Rate FiO2


 


1/23/21 07:30   18  97 Room Air  


 


1/23/21 03:07 98.4 67  128/60 (82)    





 98.4       








I&O











Intake and Output 


 


 1/23/21





 07:00


 


Intake Total 660 ml


 


Balance 660 ml


 


 


 


Intake Oral 660 ml


 


# Voids 4








PATIENT HAS A LEE:  No


General:  Alert, Oriented X3, Cooperative, mild distress


Abdomen:  Normal bowel sounds, Soft, Other (Tender to the epigastrium)


Labs





Laboratory Tests








Test


 1/21/21


09:15 1/22/21


07:42


 


White Blood Count


 5.5 x10^3/uL


(4.0-11.0) 6.4 x10^3/uL


(4.0-11.0)


 


Red Blood Count


 3.78 x10^6/uL


(3.50-5.40) 4.05 x10^6/uL


(3.50-5.40)


 


Hemoglobin


 13.1 g/dL


(12.0-15.5) 13.8 g/dL


(12.0-15.5)


 


Hematocrit


 38.5 %


(36.0-47.0) 41.4 %


(36.0-47.0)


 


Mean Corpuscular Volume


 102 fL


() 102 fL


()


 


Mean Corpuscular Hemoglobin 35 pg (25-35)  34 pg (25-35) 


 


Mean Corpuscular Hemoglobin


Concent 34 g/dL


(31-37) 33 g/dL


(31-37)


 


Red Cell Distribution Width


 15.6 %


(11.5-14.5) 15.6 %


(11.5-14.5)


 


Platelet Count


 285 x10^3/uL


(140-400) 325 x10^3/uL


(140-400)


 


Neutrophils (%) (Auto) 58 % (31-73)  65 % (31-73) 


 


Lymphocytes (%) (Auto) 30 % (24-48)  26 % (24-48) 


 


Monocytes (%) (Auto) 9 % (0-9)  7 % (0-9) 


 


Eosinophils (%) (Auto) 2 % (0-3)  1 % (0-3) 


 


Basophils (%) (Auto) 1 % (0-3)  1 % (0-3) 


 


Neutrophils # (Auto)


 3.2 x10^3/uL


(1.8-7.7) 4.1 x10^3/uL


(1.8-7.7)


 


Lymphocytes # (Auto)


 1.7 x10^3/uL


(1.0-4.8) 1.7 x10^3/uL


(1.0-4.8)


 


Monocytes # (Auto)


 0.5 x10^3/uL


(0.0-1.1) 0.4 x10^3/uL


(0.0-1.1)


 


Eosinophils # (Auto)


 0.1 x10^3/uL


(0.0-0.7) 0.1 x10^3/uL


(0.0-0.7)


 


Basophils # (Auto)


 0.1 x10^3/uL


(0.0-0.2) 0.1 x10^3/uL


(0.0-0.2)


 


Sodium Level


 141 mmol/L


(136-145) 140 mmol/L


(136-145)


 


Potassium Level


 3.9 mmol/L


(3.5-5.1) 4.1 mmol/L


(3.5-5.1)


 


Chloride Level


 105 mmol/L


() 103 mmol/L


()


 


Carbon Dioxide Level


 26 mmol/L


(21-32) 27 mmol/L


(21-32)


 


Anion Gap 10 (6-14)  10 (6-14) 


 


Blood Urea Nitrogen 9 mg/dL (7-20)  9 mg/dL (7-20) 


 


Creatinine


 1.0 mg/dL


(0.6-1.0) 1.0 mg/dL


(0.6-1.0)


 


Estimated GFR


(Cockcroft-Gault) 56.2 


 56.2 





 


Glucose Level


 104 mg/dL


(70-99) 126 mg/dL


(70-99)


 


Calcium Level


 8.5 mg/dL


(8.5-10.1) 9.8 mg/dL


(8.5-10.1)


 


C-Reactive Protein,


Quantitative 4.0 mg/L


(0-3.3) 





 


Triglycerides Level


 117 mg/dL


(0-150) 





 


CA 19-9 Antigen 75 U/mL (0-35)  


 


Thyroid Stimulating Hormone


(TSH) 63.893 uIU/mL


(0.358-3.74) 





 


Total Bilirubin


 


 0.8 mg/dL


(0.2-1.0)


 


Direct Bilirubin


 


 0.4 mg/dL


(0.0-0.2)


 


Aspartate Amino Transf


(AST/SGOT) 


 97 U/L (15-37) 





 


Alanine Aminotransferase


(ALT/SGPT) 


 296 U/L


(14-59)


 


Alkaline Phosphatase


 


 236 U/L


()


 


Total Protein


 


 8.3 g/dL


(6.4-8.2)


 


Albumin


 


 3.7 g/dL


(3.4-5.0)








Problem List


Problems


Medical Problems:


(1) Acute on chronic pancreatitis


Status: Acute  





(2) Right upper quadrant abdominal pain with positive Hodge's Sign


Status: Acute  





(3) Transaminitis


Status: Acute  








Assessment/Plan


Pancreatitis with fluid collection IR plans to aspirate this Monday for 

diagnostic purposes


We will follow up on aspiration





Justicifation of Admission Dx:


Justifications for Admission:


Justification of Admission Dx:  Yes











ALEJANDRA RÍOS MD             Jan 23, 2021 07:58

## 2021-01-24 VITALS — DIASTOLIC BLOOD PRESSURE: 78 MMHG | SYSTOLIC BLOOD PRESSURE: 133 MMHG

## 2021-01-24 VITALS — DIASTOLIC BLOOD PRESSURE: 94 MMHG | SYSTOLIC BLOOD PRESSURE: 154 MMHG

## 2021-01-24 VITALS — DIASTOLIC BLOOD PRESSURE: 87 MMHG | SYSTOLIC BLOOD PRESSURE: 145 MMHG

## 2021-01-24 VITALS — SYSTOLIC BLOOD PRESSURE: 146 MMHG | DIASTOLIC BLOOD PRESSURE: 84 MMHG

## 2021-01-24 VITALS — SYSTOLIC BLOOD PRESSURE: 150 MMHG | DIASTOLIC BLOOD PRESSURE: 87 MMHG

## 2021-01-24 VITALS — SYSTOLIC BLOOD PRESSURE: 120 MMHG | DIASTOLIC BLOOD PRESSURE: 77 MMHG

## 2021-01-24 LAB
ANION GAP SERPL CALC-SCNC: 10 MMOL/L (ref 6–14)
BASOPHILS # BLD AUTO: 0 X10^3/UL (ref 0–0.2)
BASOPHILS NFR BLD: 1 % (ref 0–3)
BUN SERPL-MCNC: 5 MG/DL (ref 7–20)
CALCIUM SERPL-MCNC: 9.5 MG/DL (ref 8.5–10.1)
CHLORIDE SERPL-SCNC: 102 MMOL/L (ref 98–107)
CO2 SERPL-SCNC: 29 MMOL/L (ref 21–32)
CREAT SERPL-MCNC: 0.9 MG/DL (ref 0.6–1)
EOSINOPHIL NFR BLD: 0.1 X10^3/UL (ref 0–0.7)
EOSINOPHIL NFR BLD: 2 % (ref 0–3)
ERYTHROCYTE [DISTWIDTH] IN BLOOD BY AUTOMATED COUNT: 15.9 % (ref 11.5–14.5)
GFR SERPLBLD BASED ON 1.73 SQ M-ARVRAT: 63.4 ML/MIN
GLUCOSE SERPL-MCNC: 103 MG/DL (ref 70–99)
HCT VFR BLD CALC: 39.8 % (ref 36–47)
HGB BLD-MCNC: 13.6 G/DL (ref 12–15.5)
LYMPHOCYTES # BLD: 1.4 X10^3/UL (ref 1–4.8)
LYMPHOCYTES NFR BLD AUTO: 24 % (ref 24–48)
MCH RBC QN AUTO: 35 PG (ref 25–35)
MCHC RBC AUTO-ENTMCNC: 34 G/DL (ref 31–37)
MCV RBC AUTO: 102 FL (ref 79–100)
MONO #: 0.5 X10^3/UL (ref 0–1.1)
MONOCYTES NFR BLD: 8 % (ref 0–9)
NEUT #: 4 X10^3/UL (ref 1.8–7.7)
NEUTROPHILS NFR BLD AUTO: 66 % (ref 31–73)
PLATELET # BLD AUTO: 271 X10^3/UL (ref 140–400)
POTASSIUM SERPL-SCNC: 3.4 MMOL/L (ref 3.5–5.1)
RBC # BLD AUTO: 3.91 X10^6/UL (ref 3.5–5.4)
SODIUM SERPL-SCNC: 141 MMOL/L (ref 136–145)
WBC # BLD AUTO: 6.1 X10^3/UL (ref 4–11)

## 2021-01-24 RX ADMIN — PROCHLORPERAZINE EDISYLATE PRN MG: 5 INJECTION INTRAMUSCULAR; INTRAVENOUS at 12:41

## 2021-01-24 RX ADMIN — HYDROMORPHONE HYDROCHLORIDE PRN MG: 2 INJECTION INTRAMUSCULAR; INTRAVENOUS; SUBCUTANEOUS at 22:23

## 2021-01-24 RX ADMIN — CYCLOBENZAPRINE HYDROCHLORIDE PRN MG: 10 TABLET, FILM COATED ORAL at 13:41

## 2021-01-24 RX ADMIN — HYDROMORPHONE HYDROCHLORIDE PRN MG: 2 INJECTION INTRAMUSCULAR; INTRAVENOUS; SUBCUTANEOUS at 06:52

## 2021-01-24 RX ADMIN — HYDROMORPHONE HYDROCHLORIDE PRN MG: 2 INJECTION INTRAMUSCULAR; INTRAVENOUS; SUBCUTANEOUS at 04:41

## 2021-01-24 RX ADMIN — HYDROMORPHONE HYDROCHLORIDE PRN MG: 2 INJECTION INTRAMUSCULAR; INTRAVENOUS; SUBCUTANEOUS at 01:00

## 2021-01-24 RX ADMIN — LEVOTHYROXINE SODIUM SCH MCG: 150 TABLET ORAL at 06:51

## 2021-01-24 RX ADMIN — HYDROMORPHONE HYDROCHLORIDE PRN MG: 2 INJECTION INTRAMUSCULAR; INTRAVENOUS; SUBCUTANEOUS at 10:28

## 2021-01-24 RX ADMIN — NICOTINE SCH PATCH: 21 PATCH, EXTENDED RELEASE TOPICAL at 08:34

## 2021-01-24 RX ADMIN — Medication SCH EA: at 19:48

## 2021-01-24 RX ADMIN — PROCHLORPERAZINE EDISYLATE PRN MG: 5 INJECTION INTRAMUSCULAR; INTRAVENOUS at 04:48

## 2021-01-24 RX ADMIN — ENOXAPARIN SODIUM SCH MG: 40 INJECTION SUBCUTANEOUS at 19:32

## 2021-01-24 RX ADMIN — HYDROMORPHONE HYDROCHLORIDE PRN MG: 2 INJECTION INTRAMUSCULAR; INTRAVENOUS; SUBCUTANEOUS at 19:48

## 2021-01-24 RX ADMIN — HYDROMORPHONE HYDROCHLORIDE PRN MG: 2 INJECTION INTRAMUSCULAR; INTRAVENOUS; SUBCUTANEOUS at 12:37

## 2021-01-24 RX ADMIN — PANTOPRAZOLE SODIUM SCH MG: 40 INJECTION, POWDER, FOR SOLUTION INTRAVENOUS at 08:33

## 2021-01-24 RX ADMIN — HYDROMORPHONE HYDROCHLORIDE PRN MG: 2 INJECTION INTRAMUSCULAR; INTRAVENOUS; SUBCUTANEOUS at 15:05

## 2021-01-24 RX ADMIN — HYDROMORPHONE HYDROCHLORIDE PRN MG: 2 INJECTION INTRAMUSCULAR; INTRAVENOUS; SUBCUTANEOUS at 01:04

## 2021-01-24 RX ADMIN — LOSARTAN POTASSIUM SCH MG: 50 TABLET ORAL at 08:33

## 2021-01-24 RX ADMIN — PROCHLORPERAZINE EDISYLATE PRN MG: 5 INJECTION INTRAMUSCULAR; INTRAVENOUS at 19:54

## 2021-01-24 RX ADMIN — CYCLOBENZAPRINE HYDROCHLORIDE PRN MG: 10 TABLET, FILM COATED ORAL at 22:23

## 2021-01-24 RX ADMIN — HYDROMORPHONE HYDROCHLORIDE PRN MG: 2 INJECTION INTRAMUSCULAR; INTRAVENOUS; SUBCUTANEOUS at 17:42

## 2021-01-24 NOTE — PDOC
G I PROGRESS NOTE


Subjective


More discomfort today radiating to back.


Physical Exam


Lungs clear.


RRR


Abdomen soft, tender upper.


Review of Relevant


I have reviewed the following items raffi (where applicable) has been applied.


Labs





Laboratory Tests








Test


 1/23/21


08:01 1/24/21


07:50


 


White Blood Count


 5.3 x10^3/uL


(4.0-11.0) 6.1 x10^3/uL


(4.0-11.0)


 


Red Blood Count


 4.03 x10^6/uL


(3.50-5.40) 3.91 x10^6/uL


(3.50-5.40)


 


Hemoglobin


 14.0 g/dL


(12.0-15.5) 13.6 g/dL


(12.0-15.5)


 


Hematocrit


 41.1 %


(36.0-47.0) 39.8 %


(36.0-47.0)


 


Mean Corpuscular Volume


 102 fL


() 102 fL


()


 


Mean Corpuscular Hemoglobin 35 pg (25-35)  35 pg (25-35) 


 


Mean Corpuscular Hemoglobin


Concent 34 g/dL


(31-37) 34 g/dL


(31-37)


 


Red Cell Distribution Width


 16.0 %


(11.5-14.5) 15.9 %


(11.5-14.5)


 


Platelet Count


 281 x10^3/uL


(140-400) 271 x10^3/uL


(140-400)


 


Neutrophils (%) (Auto) 62 % (31-73)  66 % (31-73) 


 


Lymphocytes (%) (Auto) 27 % (24-48)  24 % (24-48) 


 


Monocytes (%) (Auto) 9 % (0-9)  8 % (0-9) 


 


Eosinophils (%) (Auto) 1 % (0-3)  2 % (0-3) 


 


Basophils (%) (Auto) 1 % (0-3)  1 % (0-3) 


 


Neutrophils # (Auto)


 3.3 x10^3/uL


(1.8-7.7) 4.0 x10^3/uL


(1.8-7.7)


 


Lymphocytes # (Auto)


 1.4 x10^3/uL


(1.0-4.8) 1.4 x10^3/uL


(1.0-4.8)


 


Monocytes # (Auto)


 0.5 x10^3/uL


(0.0-1.1) 0.5 x10^3/uL


(0.0-1.1)


 


Eosinophils # (Auto)


 0.1 x10^3/uL


(0.0-0.7) 0.1 x10^3/uL


(0.0-0.7)


 


Basophils # (Auto)


 0.1 x10^3/uL


(0.0-0.2) 0.0 x10^3/uL


(0.0-0.2)


 


Sodium Level


 140 mmol/L


(136-145) 141 mmol/L


(136-145)


 


Potassium Level


 3.9 mmol/L


(3.5-5.1) 3.4 mmol/L


(3.5-5.1)


 


Chloride Level


 103 mmol/L


() 102 mmol/L


()


 


Carbon Dioxide Level


 28 mmol/L


(21-32) 29 mmol/L


(21-32)


 


Anion Gap 9 (6-14)  10 (6-14) 


 


Blood Urea Nitrogen 7 mg/dL (7-20)  5 mg/dL (7-20) 


 


Creatinine


 1.0 mg/dL


(0.6-1.0) 0.9 mg/dL


(0.6-1.0)


 


Estimated GFR


(Cockcroft-Gault) 56.2 


 63.4 





 


Glucose Level


 101 mg/dL


(70-99) 103 mg/dL


(70-99)


 


Calcium Level


 9.9 mg/dL


(8.5-10.1) 9.5 mg/dL


(8.5-10.1)








Laboratory Tests








Test


 1/24/21


07:50


 


White Blood Count


 6.1 x10^3/uL


(4.0-11.0)


 


Red Blood Count


 3.91 x10^6/uL


(3.50-5.40)


 


Hemoglobin


 13.6 g/dL


(12.0-15.5)


 


Hematocrit


 39.8 %


(36.0-47.0)


 


Mean Corpuscular Volume


 102 fL


()


 


Mean Corpuscular Hemoglobin 35 pg (25-35) 


 


Mean Corpuscular Hemoglobin


Concent 34 g/dL


(31-37)


 


Red Cell Distribution Width


 15.9 %


(11.5-14.5)


 


Platelet Count


 271 x10^3/uL


(140-400)


 


Neutrophils (%) (Auto) 66 % (31-73) 


 


Lymphocytes (%) (Auto) 24 % (24-48) 


 


Monocytes (%) (Auto) 8 % (0-9) 


 


Eosinophils (%) (Auto) 2 % (0-3) 


 


Basophils (%) (Auto) 1 % (0-3) 


 


Neutrophils # (Auto)


 4.0 x10^3/uL


(1.8-7.7)


 


Lymphocytes # (Auto)


 1.4 x10^3/uL


(1.0-4.8)


 


Monocytes # (Auto)


 0.5 x10^3/uL


(0.0-1.1)


 


Eosinophils # (Auto)


 0.1 x10^3/uL


(0.0-0.7)


 


Basophils # (Auto)


 0.0 x10^3/uL


(0.0-0.2)


 


Sodium Level


 141 mmol/L


(136-145)


 


Potassium Level


 3.4 mmol/L


(3.5-5.1)


 


Chloride Level


 102 mmol/L


()


 


Carbon Dioxide Level


 29 mmol/L


(21-32)


 


Anion Gap 10 (6-14) 


 


Blood Urea Nitrogen 5 mg/dL (7-20) 


 


Creatinine


 0.9 mg/dL


(0.6-1.0)


 


Estimated GFR


(Cockcroft-Gault) 63.4 





 


Glucose Level


 103 mg/dL


(70-99)


 


Calcium Level


 9.5 mg/dL


(8.5-10.1)








Microbiology


1/21/21 Urine Culture - Final, Complete


          


1/21/21 Antimicrobic Susceptibility - Final, Complete


Vitals/I & O





Vital Sign - Last 24 Hours








 1/23/21 1/23/21 1/23/21 1/23/21





 13:24 13:25 14:00 15:59


 


Temp    98.2





    98.2


 


Pulse    83


 


Resp    20


 


B/P (MAP)    121/63 (82)


 


Pulse Ox   98 98


 


O2 Delivery Room Air Room Air Room Air Room Air


 


    





    





 1/23/21 1/23/21 1/23/21 1/23/21





 15:59 17:05 17:06 18:30


 


Pulse Ox    98


 


O2 Delivery Room Air Room Air Room Air Room Air





 1/23/21 1/23/21 1/23/21 1/23/21





 19:00 19:24 19:56 20:08


 


Temp 98.1   





 98.1   


 


Pulse 77   


 


Resp 20  18 


 


B/P (MAP) 140/62 (88)   


 


Pulse Ox 97  97 


 


O2 Delivery Room Air Room Air Room Air Room Air


 


    





    





 1/23/21 1/23/21 1/23/21 1/23/21





 21:30 22:40 22:44 23:02


 


Temp    98.0





    98.0


 


Pulse    77


 


Resp 18 18 18 20


 


B/P (MAP)    136/78 (97)


 


Pulse Ox 97 97 97 96


 


O2 Delivery Room Air Room Air Room Air Room Air


 


    





    





 1/24/21 1/24/21 1/24/21 1/24/21





 00:05 01:04 02:54 03:35


 


Temp    97.9





    97.9


 


Pulse    69


 


Resp 18 18 18 20


 


B/P (MAP)    133/78 (96)


 


Pulse Ox  96 96 96


 


O2 Delivery  Room Air Room Air Room Air


 


    





    





 1/24/21 1/24/21 1/24/21 1/24/21





 04:41 05:20 06:52 07:23


 


Resp 18  18 18


 


Pulse Ox 96 96 96 96


 


O2 Delivery Room Air Room Air Room Air Room Air





 1/24/21 1/24/21 1/24/21 1/24/21





 07:59 08:33 08:34 08:34


 


Temp 98.3   





 98.3   


 


Pulse 63 63 63 


 


Resp 20   


 


B/P (MAP) 154/94 (114) 154/94 154/94 


 


Pulse Ox 98   


 


O2 Delivery Room Air   Room Air


 


    





    





 1/24/21 1/24/21 1/24/21 1/24/21





 10:28 10:28 11:56 12:37


 


Pulse Ox   98 


 


O2 Delivery Room Air Room Air Room Air Room Air














Intake and Output   


 


 1/23/21 1/23/21 1/24/21





 15:00 23:00 07:00


 


Intake Total 980 ml  


 


Balance 980 ml  








Problem List


Problems


Medical Problems:


(1) Acute on chronic pancreatitis


Status: Acute  





(2) Right upper quadrant abdominal pain with positive Hodge's Sign


Status: Acute  





(3) Transaminitis


Status: Acute  





Assessment


Pancreatic cyst/pseudocyst--unclearly benign.


Symptoms c/w pancreatitis; from the cyst?


Plan of Care Note


To have cyst aspirated tomorrow.  Await this.





Justicifation of Admission Dx:


Justifications for Admission:


Justification of Admission Dx:  Yes











HEATHER HENRIQUEZ MD          Jan 24, 2021 12:50

## 2021-01-24 NOTE — PDOC
[facility] PROGRESS NOTE


Date of Service


DOS:


DATE: 1/24/21 


TIME: 08:32





Chief Complaint


Chief Complaint


Acute on chronic pancreatitis


Pancreatic pseudocyst


Dilated gallbladder vs cholecystitis


Transaminitis





Plan:


NPO; continue IV fluids at 250cc


Consult to GI


Follow urine output


Consult general surgery


No gallstones seen on ultrasound; will defer to general surgery for 

recommendations on HIDA scan


Dilaudid IV as needed for pain


Resume home medications


FEN - NPO


PPX  - Lovenox


FULL CODE


Dispo - inpatient for above





History of Present Illness


History of Present Illness


Patient 60-year-old female presents to the ER with complaint of left upper quadr

ant abdominal pain.  She was seen in the ED yesterday for similar symptoms, but 

left AMA.  CT abdomen/pelvis at that time showed acute on chronic hepatitis with

7.8 cm pancreatic pseudocyst and distended gallbladder.  She returned to the ER 

today the behest of her PCP.  She reports abdominal pain, 10/10.





1/24: Afebrile.  Still complains of abdominal pain.  Plan for IR to biopsy and 

potentially drain pseudocyst on Monday.  Continue full liquid diet.





1/23: Discussed results MRCP with patient.  She has 7.5 cm pseudocyst is seen 

within the head of the pancreas, mass effect compressing the proximal main 

pancreatic duct and the common bile duct resulting in intra and extrahepatic 

biliary ductal dilatation and gallbladder distention and consulted 

interventional radiology to try to biopsy and aspirate pseudocyst.  This will 

take place Monday.  Patient reports 8/10 abdominal pain, requesting regular 

diet.  Discussed this would not be consistent with appropriate medical 

management pancreatitis.  Continue full liquid diet.





1/22: HIDA scan yesterday showing slow passage of radiotracer into the small 

bowel without evidence of a high-grade bile duct obstruction.  Continue 

treatment of pancreatitis with bowel rest and IV fluids.  Follow for resolution 

of pancreatic pseudocyst.  Will consult IR to see about sampling pancreatic 

pseudocyst.  She is scheduled for MRCP today.  Patient does admits to improperly

taking her levothyroxine with her other home medications.  Discussed taking 

levothyroxine as first medication in the morning at least 1 hour prior to any 

other food or medications.





Vitals/I&O


Vitals/I&O:





                                   Vital Signs








  Date Time  Temp Pulse Resp B/P (MAP) Pulse Ox O2 Delivery O2 Flow Rate FiO2


 


1/24/21 07:23   18  96 Room Air  


 


1/24/21 03:35 97.9 69  133/78 (96)    





 97.9       














                                    I & O   


 


 1/23/21 1/23/21 1/24/21





 15:00 23:00 07:00


 


Intake Total 980 ml  


 


Balance 980 ml  











Physical Exam


General:  Alert, Oriented X3, Cooperative, mild distress


Heart:  Regular rate, Normal S1, Normal S2


Lungs:  Clear


Abdomen:  Normal bowel sounds, Soft, Other (Tender to the epigastrium)


Extremities:  No clubbing, No cyanosis


Skin:  No rashes, No breakdown





Assessment and Plan


Assessmemt and Plan


Problems


Medical Problems:


(1) Acute on chronic pancreatitis


Status: Acute  





(2) Right upper quadrant abdominal pain with positive Hodge's Sign


Status: Acute  





(3) Transaminitis


Status: Acute  











Comment


Review of Relevant


I have reviewed the following items raffi (where applicable) has been applied.





Justifications for Admission


Other Justification


Acute pancreatitis











MILTON BERRIOS MD            Jan 24, 2021 08:33

## 2021-01-25 VITALS — SYSTOLIC BLOOD PRESSURE: 129 MMHG | DIASTOLIC BLOOD PRESSURE: 75 MMHG

## 2021-01-25 VITALS — DIASTOLIC BLOOD PRESSURE: 77 MMHG | SYSTOLIC BLOOD PRESSURE: 104 MMHG

## 2021-01-25 VITALS — SYSTOLIC BLOOD PRESSURE: 156 MMHG | DIASTOLIC BLOOD PRESSURE: 84 MMHG

## 2021-01-25 VITALS — DIASTOLIC BLOOD PRESSURE: 82 MMHG | SYSTOLIC BLOOD PRESSURE: 140 MMHG

## 2021-01-25 VITALS — DIASTOLIC BLOOD PRESSURE: 90 MMHG | SYSTOLIC BLOOD PRESSURE: 151 MMHG

## 2021-01-25 VITALS — SYSTOLIC BLOOD PRESSURE: 130 MMHG | DIASTOLIC BLOOD PRESSURE: 77 MMHG

## 2021-01-25 VITALS — DIASTOLIC BLOOD PRESSURE: 75 MMHG | SYSTOLIC BLOOD PRESSURE: 122 MMHG

## 2021-01-25 VITALS — SYSTOLIC BLOOD PRESSURE: 129 MMHG | DIASTOLIC BLOOD PRESSURE: 82 MMHG

## 2021-01-25 VITALS — DIASTOLIC BLOOD PRESSURE: 75 MMHG | SYSTOLIC BLOOD PRESSURE: 117 MMHG

## 2021-01-25 VITALS — DIASTOLIC BLOOD PRESSURE: 78 MMHG | SYSTOLIC BLOOD PRESSURE: 147 MMHG

## 2021-01-25 LAB
ANION GAP SERPL CALC-SCNC: 8 MMOL/L (ref 6–14)
APTT BLD: 32 SEC (ref 24–38)
BASOPHILS # BLD AUTO: 0 X10^3/UL (ref 0–0.2)
BASOPHILS NFR BLD: 0 % (ref 0–3)
BUN SERPL-MCNC: 5 MG/DL (ref 7–20)
CALCIUM SERPL-MCNC: 9.5 MG/DL (ref 8.5–10.1)
CHLORIDE SERPL-SCNC: 102 MMOL/L (ref 98–107)
CO2 SERPL-SCNC: 30 MMOL/L (ref 21–32)
CREAT SERPL-MCNC: 1 MG/DL (ref 0.6–1)
EOSINOPHIL NFR BLD: 0.2 X10^3/UL (ref 0–0.7)
EOSINOPHIL NFR BLD: 3 % (ref 0–3)
ERYTHROCYTE [DISTWIDTH] IN BLOOD BY AUTOMATED COUNT: 15.5 % (ref 11.5–14.5)
GFR SERPLBLD BASED ON 1.73 SQ M-ARVRAT: 56.2 ML/MIN
GLUCOSE SERPL-MCNC: 101 MG/DL (ref 70–99)
HCT VFR BLD CALC: 37.9 % (ref 36–47)
HGB BLD-MCNC: 12.7 G/DL (ref 12–15.5)
LYMPHOCYTES # BLD: 1.2 X10^3/UL (ref 1–4.8)
LYMPHOCYTES NFR BLD AUTO: 19 % (ref 24–48)
MCH RBC QN AUTO: 34 PG (ref 25–35)
MCHC RBC AUTO-ENTMCNC: 34 G/DL (ref 31–37)
MCV RBC AUTO: 102 FL (ref 79–100)
MONO #: 0.6 X10^3/UL (ref 0–1.1)
MONOCYTES NFR BLD: 9 % (ref 0–9)
NEUT #: 4.6 X10^3/UL (ref 1.8–7.7)
NEUTROPHILS NFR BLD AUTO: 70 % (ref 31–73)
PLATELET # BLD AUTO: 243 X10^3/UL (ref 140–400)
POTASSIUM SERPL-SCNC: 3.5 MMOL/L (ref 3.5–5.1)
PROTHROMBIN TIME: 12.4 SEC (ref 11.7–14)
RBC # BLD AUTO: 3.72 X10^6/UL (ref 3.5–5.4)
SODIUM SERPL-SCNC: 140 MMOL/L (ref 136–145)
WBC # BLD AUTO: 6.6 X10^3/UL (ref 4–11)

## 2021-01-25 RX ADMIN — ENOXAPARIN SODIUM SCH MG: 40 INJECTION SUBCUTANEOUS at 21:44

## 2021-01-25 RX ADMIN — HYDROMORPHONE HYDROCHLORIDE PRN MG: 2 INJECTION INTRAMUSCULAR; INTRAVENOUS; SUBCUTANEOUS at 07:59

## 2021-01-25 RX ADMIN — LOSARTAN POTASSIUM SCH MG: 50 TABLET ORAL at 09:00

## 2021-01-25 RX ADMIN — CEFTRIAXONE SCH GM: 1 INJECTION, POWDER, FOR SOLUTION INTRAMUSCULAR; INTRAVENOUS at 14:23

## 2021-01-25 RX ADMIN — LEVOTHYROXINE SODIUM SCH MCG: 150 TABLET ORAL at 05:09

## 2021-01-25 RX ADMIN — HYDROMORPHONE HYDROCHLORIDE PRN MG: 2 INJECTION INTRAMUSCULAR; INTRAVENOUS; SUBCUTANEOUS at 14:22

## 2021-01-25 RX ADMIN — HYDROMORPHONE HYDROCHLORIDE PRN MG: 2 INJECTION INTRAMUSCULAR; INTRAVENOUS; SUBCUTANEOUS at 05:56

## 2021-01-25 RX ADMIN — ENOXAPARIN SODIUM SCH MG: 40 INJECTION SUBCUTANEOUS at 21:50

## 2021-01-25 RX ADMIN — HYDROMORPHONE HYDROCHLORIDE PRN MG: 2 INJECTION INTRAMUSCULAR; INTRAVENOUS; SUBCUTANEOUS at 03:39

## 2021-01-25 RX ADMIN — PROCHLORPERAZINE EDISYLATE PRN MG: 5 INJECTION INTRAMUSCULAR; INTRAVENOUS at 18:58

## 2021-01-25 RX ADMIN — NICOTINE SCH PATCH: 21 PATCH, EXTENDED RELEASE TOPICAL at 08:00

## 2021-01-25 RX ADMIN — HYDROMORPHONE HYDROCHLORIDE PRN MG: 2 INJECTION INTRAMUSCULAR; INTRAVENOUS; SUBCUTANEOUS at 23:53

## 2021-01-25 RX ADMIN — PROCHLORPERAZINE EDISYLATE PRN MG: 5 INJECTION INTRAMUSCULAR; INTRAVENOUS at 05:58

## 2021-01-25 RX ADMIN — PANTOPRAZOLE SODIUM SCH MG: 40 INJECTION, POWDER, FOR SOLUTION INTRAVENOUS at 07:59

## 2021-01-25 RX ADMIN — HYDROMORPHONE HYDROCHLORIDE PRN MG: 2 INJECTION INTRAMUSCULAR; INTRAVENOUS; SUBCUTANEOUS at 21:43

## 2021-01-25 RX ADMIN — HYDROMORPHONE HYDROCHLORIDE PRN MG: 2 INJECTION INTRAMUSCULAR; INTRAVENOUS; SUBCUTANEOUS at 00:51

## 2021-01-25 RX ADMIN — HYDROMORPHONE HYDROCHLORIDE PRN MG: 2 INJECTION INTRAMUSCULAR; INTRAVENOUS; SUBCUTANEOUS at 16:40

## 2021-01-25 RX ADMIN — Medication SCH EA: at 21:00

## 2021-01-25 RX ADMIN — HYDROMORPHONE HYDROCHLORIDE PRN MG: 2 INJECTION INTRAMUSCULAR; INTRAVENOUS; SUBCUTANEOUS at 10:15

## 2021-01-25 RX ADMIN — HYDROMORPHONE HYDROCHLORIDE PRN MG: 2 INJECTION INTRAMUSCULAR; INTRAVENOUS; SUBCUTANEOUS at 18:55

## 2021-01-25 NOTE — NUR
SW following. Discussed with RN, pt from home with spouse, room air, full liquid diet. Pt 
having aspiration of cyst today. RN advised no SW needs at this time. SW will continue to 
follow.

## 2021-01-25 NOTE — PDOC
PROGRESS NOTES


Date of Service:


DATE: 21 


TIME: 09:06





Chief Complaint


Chief Complaint


impression =======================











Acute on chronic pancreatitis


Pancreatic pseudocyst


Dilated gallbladder vs cholecystitis


Transaminitis


UTI E COLI, IV ROCEPHIN 








Plan:





NPO; continue IV fluids 


GI following 


Follow urine output


Consult general surgery


No gallstones seen on ultrasound; will defer to general surgery for 

recommendations on HIDA scan


Dilaudid IV as needed for pain


Resume home medications


FEN - NPO


PPX  - Lovenox


FULL CODE


Dispo - inpatient for above








d/w RN





History of Present Illness


History of Present Illness


Patient 60-year-old female presented  to the ER with complaint of left upper 

quadrant abdominal pain.  She was seen in the ED yesterday for similar symptoms,

but left AMA.  CT abdomen/pelvis at that time showed acute on chronic hepatitis 

with 7.8 cm pancreatic pseudocyst and distended gallbladder.  She returned to 

the ER  the behest of her PCP.  She reports abdominal pain, 10/10.








 drainage of pseudocyst today  c/o persistent pain


D/W RN 








: Afebrile.  Still complains of abdominal pain.  Plan for IR to biopsy and 

potentially drain pseudocyst on Monday.  Continue full liquid diet.





: Discussed results MRCP with patient.  She has 7.5 cm pseudocyst is seen 

within the head of the pancreas, mass effect compressing the proximal main 

pancreatic duct and the common bile duct resulting in intra and extrahepatic 

biliary ductal dilatation and gallbladder distention and consulted 

interventional radiology to try to biopsy and aspirate pseudocyst.  This will 

take place Monday.  Patient reports 8/10 abdominal pain, requesting regular 

diet.  Discussed this would not be consistent with appropriate medical 

management pancreatitis.  Continue full liquid diet.





: HIDA scan yesterday showing slow passage of radiotracer into the small 

bowel without evidence of a high-grade bile duct obstruction.  Continue 

treatment of pancreatitis with bowel rest and IV fluids.  Follow for resolution 

of pancreatic pseudocyst.  Will consult IR to see about sampling pancreatic 

pseudocyst.  She is scheduled for MRCP today.  Patient does admits to improperly

taking her levothyroxine with her other home medications.  Discussed taking 

levothyroxine as first medication in the morning at least 1 hour prior to any 

other food or medications.





Vitals


Vitals





Vital Signs








  Date Time  Temp Pulse Resp B/P (MAP) Pulse Ox O2 Delivery O2 Flow Rate FiO2


 


21 08:29      Room Air  


 


21 07:00 98.1 49 18 147/78 (101) 94   





 98.1       











Physical Exam


General:  Alert, Oriented X3, Cooperative, mild distress


Heart:  Regular rate, Normal S1, Normal S2


Lungs:  Clear


Abdomen:  Normal bowel sounds, Soft, Other (Tender to the epigastrium)


Extremities:  No clubbing, No cyanosis


Skin:  No rashes, No breakdown





Labs


LABS








SPEC #: [id number]       FELICITAS:      STATUS:  COMP           REQ 

#: [id number]


                            RECD:      SUBM DR: MILTON BERRIOS MD

          


SOURCE: VOID                ENTR:      OT DR: MEKHI LEONARD MD 

          


SPDESC:                                                      ANDRES MICHELE MD, MICHAEL F MD


ORDERED:  URINE CULTURE                                                         

           


 

--------------------------------------------------------------------------------


------------





  Procedure                         Result                                      

         


------------------------------------------------------

--------------------------------------





  URINE CULTURE  Final  


        Final





        GREATER THAN 100,000 CFU/ML GRAM NEGATIVE RODS on 21


        at 0810


        FINAL ID= [ESCHERICHIA COLI]


                           Testing Performed by:


                         [address] [address]


          For Inquires, the Physician may contact the Microbiology


                        department at [phone]


        ESCHERICHIA COLI





  ANTIMICROBIAL SUSCEPTIBILITY  Final  


        Comment





        NEG ELENA 56


        ESCHERICHIA COLI


        ANTIBIOTIC                        RESULT          INTERPRETATION


        AMPICILLIN/SULBACTAM              16/8                  I


        AMIKACIN                          <=16                  S


        AMPICILLIN                        >16                   R


        AMOXICILLIN/K CLAVULANATE         >16/8                 R


        AZTREONAM                         <=4                   S


        CEFTRIAXONE                       <=1                   S


        CEFTAZIDIME                       <=1                   S


        CEFOTAXIME                        <=2                   S


        CEFOXITIN                         <=8                   S


        CIPROFLOXACIN                     <=0.25                S


        CEFEPIME                          <=2                   S


        CEFUROXIME                        8                     S


        CEFTAZIDIME/AVIBACTAM             <=4                   S


        ERTAPENEM                         <=0.5                 S


        NITROFURANTOIN                    <=32                  S


        GENTAMICIN                        <=2                   S


        LEVOFLOXACIN                      <=0.5                 S


PATIENT: PAMELA DU  ACCOUNT: [id number]     MRN#: [id number]


: 1960           LOCATION: [address] [place] [address]         AGE: 60


SEX: F                    EXAM DT: 21         ACCESSION#: [id number]


STATUS: ADM IN            ORD. PHYSICIAN: MELY MARIEE


REASON: chronic pancreatitis and pseudocyst - unclear etiology, dilated CBD


PROCEDURE: MRCP WO CONTRAST





MRI of the abdomen without contrast to include a MRCP 2021





CLINICAL HISTORY: Pancreatitis and pseudocyst. Dilated common bile duct.





TECHNIQUE: Unenhanced T2-weighted axial and coronal, fat saturated T2-weighted 

axial, diffusion-weighted axial and in and out of phase T1-weighted axial images

of the abdomen were obtained. Additionally thin section fat saturated T2-

weighted coronal images of the abdomen were obtained. Multiplanar 3-D MIP 

reconstructed images of the biliary system were obtained for an MRCP.





FINDINGS: Comparison is made to the patient's CT scan of the abdomen dated 

2021.





Fatty infiltration of the liver is again noted. A 1.8 cm oval-shaped area of 

increased signal intensity is seen on the T2-weighted images involving the 

posterior aspect of the right lobe of the liver which demonstrates decreased 

signal intensity on the T1-weighted images. This likely represents a cyst. The 

spleen and adrenal glands are within normal limits. Rounded high signal 

intensity lesions are seen scattered throughout both kidneys on the T2-weighted 

images. These measure 3 mm to 1.6 cm in size. They likely represent cysts. No 

further imaging evaluation is recommended.





A pancreatic pseudocyst is seen within the head of the pancreas which measures 

7.5 x 6.4 x 5.5 cm in AP, transverse and craniocaudal dimensions. Mass effect 

related to this pancreatic pseudocyst extrinsically compresses the proximal main

pancreatic duct and the common bile duct. The mid/distal main pancreatic duct is

dilated. The common hepatic duct and left and right hepatic ducts are dilated. 

No additional pancreatic pseudocyst is noted.





The abdominal aorta tapers normally. No free fluid is seen. There is no evidence

of bowel obstruction. Mild S-shaped curvature of the thoracolumbar spine is 

again noted.





MRCP images demonstrate dilatation of the gallbladder and cystic duct along with

the common hepatic duct and the left and right hepatic ducts and their proximal 

branches. Extrinsic mass effect from patient's pseudocyst compresses the common 

bile duct. No cholelithiasis or choledocholithiasis is seen.





IMPRESSION: A 7.5 cm pseudocyst is seen within the head of the pancreas. Mass 

effect related to this pseudocyst compresses the proximal main pancreatic duct 

and the common bile duct resulting in intra and extrahepatic biliary ductal 

dilatation and gallbladder distention. No choledocholithiasis is seen.





Electronically signed by: Mark Yoo MD (2021 1:45 PM) [id number]





Laboratory Tests








Test


 21


07:10


 


White Blood Count


 6.6 x10^3/uL


(4.0-11.0)


 


Red Blood Count


 3.72 x10^6/uL


(3.50-5.40)


 


Hemoglobin


 12.7 g/dL


(12.0-15.5)


 


Hematocrit


 37.9 %


(36.0-47.0)


 


Mean Corpuscular Volume


 102 fL


()


 


Mean Corpuscular Hemoglobin 34 pg (25-35) 


 


Mean Corpuscular Hemoglobin


Concent 34 g/dL


(31-37)


 


Red Cell Distribution Width


 15.5 %


(11.5-14.5)


 


Platelet Count


 243 x10^3/uL


(140-400)


 


Neutrophils (%) (Auto) 70 % (31-73) 


 


Lymphocytes (%) (Auto) 19 % (24-48) 


 


Monocytes (%) (Auto) 9 % (0-9) 


 


Eosinophils (%) (Auto) 3 % (0-3) 


 


Basophils (%) (Auto) 0 % (0-3) 


 


Neutrophils # (Auto)


 4.6 x10^3/uL


(1.8-7.7)


 


Lymphocytes # (Auto)


 1.2 x10^3/uL


(1.0-4.8)


 


Monocytes # (Auto)


 0.6 x10^3/uL


(0.0-1.1)


 


Eosinophils # (Auto)


 0.2 x10^3/uL


(0.0-0.7)


 


Basophils # (Auto)


 0.0 x10^3/uL


(0.0-0.2)


 


Prothrombin Time


 12.4 SEC


(11.7-14.0)


 


Prothromb Time International


Ratio 1.0 (0.8-1.1) 





 


Activated Partial


Thromboplast Time 32 SEC (24-38) 














Assessment and Plan


Assessmemt and Plan


Problems


Medical Problems:


(1) Acute on chronic pancreatitis


Status: Acute  





(2) Right upper quadrant abdominal pain with positive Hodge's Sign


Status: Acute  





(3) Transaminitis


Status: Acute  











Comment


Review of Relevant


I have reviewed the following items raffi (where applicable) has been applied.


Labs





Laboratory Tests








Test


 21


07:50 21


07:10


 


White Blood Count


 6.1 x10^3/uL


(4.0-11.0) 6.6 x10^3/uL


(4.0-11.0)


 


Red Blood Count


 3.91 x10^6/uL


(3.50-5.40) 3.72 x10^6/uL


(3.50-5.40)


 


Hemoglobin


 13.6 g/dL


(12.0-15.5) 12.7 g/dL


(12.0-15.5)


 


Hematocrit


 39.8 %


(36.0-47.0) 37.9 %


(36.0-47.0)


 


Mean Corpuscular Volume


 102 fL


() 102 fL


()


 


Mean Corpuscular Hemoglobin 35 pg (25-35)  34 pg (25-35) 


 


Mean Corpuscular Hemoglobin


Concent 34 g/dL


(31-37) 34 g/dL


(31-37)


 


Red Cell Distribution Width


 15.9 %


(11.5-14.5) 15.5 %


(11.5-14.5)


 


Platelet Count


 271 x10^3/uL


(140-400) 243 x10^3/uL


(140-400)


 


Neutrophils (%) (Auto) 66 % (31-73)  70 % (31-73) 


 


Lymphocytes (%) (Auto) 24 % (24-48)  19 % (24-48) 


 


Monocytes (%) (Auto) 8 % (0-9)  9 % (0-9) 


 


Eosinophils (%) (Auto) 2 % (0-3)  3 % (0-3) 


 


Basophils (%) (Auto) 1 % (0-3)  0 % (0-3) 


 


Neutrophils # (Auto)


 4.0 x10^3/uL


(1.8-7.7) 4.6 x10^3/uL


(1.8-7.7)


 


Lymphocytes # (Auto)


 1.4 x10^3/uL


(1.0-4.8) 1.2 x10^3/uL


(1.0-4.8)


 


Monocytes # (Auto)


 0.5 x10^3/uL


(0.0-1.1) 0.6 x10^3/uL


(0.0-1.1)


 


Eosinophils # (Auto)


 0.1 x10^3/uL


(0.0-0.7) 0.2 x10^3/uL


(0.0-0.7)


 


Basophils # (Auto)


 0.0 x10^3/uL


(0.0-0.2) 0.0 x10^3/uL


(0.0-0.2)


 


Sodium Level


 141 mmol/L


(136-145) 





 


Potassium Level


 3.4 mmol/L


(3.5-5.1) 





 


Chloride Level


 102 mmol/L


() 





 


Carbon Dioxide Level


 29 mmol/L


(21-32) 





 


Anion Gap 10 (6-14)  


 


Blood Urea Nitrogen 5 mg/dL (7-20)  


 


Creatinine


 0.9 mg/dL


(0.6-1.0) 





 


Estimated GFR


(Cockcroft-Gault) 63.4 


 





 


Glucose Level


 103 mg/dL


(70-99) 





 


Calcium Level


 9.5 mg/dL


(8.5-10.1) 





 


Prothrombin Time


 


 12.4 SEC


(11.7-14.0)


 


Prothromb Time International


Ratio 


 1.0 (0.8-1.1) 





 


Activated Partial


Thromboplast Time 


 32 SEC (24-38) 











Laboratory Tests








Test


 21


07:10


 


White Blood Count


 6.6 x10^3/uL


(4.0-11.0)


 


Red Blood Count


 3.72 x10^6/uL


(3.50-5.40)


 


Hemoglobin


 12.7 g/dL


(12.0-15.5)


 


Hematocrit


 37.9 %


(36.0-47.0)


 


Mean Corpuscular Volume


 102 fL


()


 


Mean Corpuscular Hemoglobin 34 pg (25-35) 


 


Mean Corpuscular Hemoglobin


Concent 34 g/dL


(31-37)


 


Red Cell Distribution Width


 15.5 %


(11.5-14.5)


 


Platelet Count


 243 x10^3/uL


(140-400)


 


Neutrophils (%) (Auto) 70 % (31-73) 


 


Lymphocytes (%) (Auto) 19 % (24-48) 


 


Monocytes (%) (Auto) 9 % (0-9) 


 


Eosinophils (%) (Auto) 3 % (0-3) 


 


Basophils (%) (Auto) 0 % (0-3) 


 


Neutrophils # (Auto)


 4.6 x10^3/uL


(1.8-7.7)


 


Lymphocytes # (Auto)


 1.2 x10^3/uL


(1.0-4.8)


 


Monocytes # (Auto)


 0.6 x10^3/uL


(0.0-1.1)


 


Eosinophils # (Auto)


 0.2 x10^3/uL


(0.0-0.7)


 


Basophils # (Auto)


 0.0 x10^3/uL


(0.0-0.2)


 


Prothrombin Time


 12.4 SEC


(11.7-14.0)


 


Prothromb Time International


Ratio 1.0 (0.8-1.1) 





 


Activated Partial


Thromboplast Time 32 SEC (24-38) 











Microbiology


21 Urine Culture - Final, Complete


          


21 Antimicrobic Susceptibility - Final, Complete


Medications





Current Medications


Sodium Chloride 1,000 ml @  1,000 mls/hr Q1H IV  Last administered on 21at 

20:15;  Start 21 at 20:00;  Stop 21 at 20:59;  Status DC


Sodium Chloride 1,000 ml @  250 mls/hr Q4H IV  Last administered on 21at 

21:00;  Start 21 at 20:00;  Stop 21 at 02:05;  Status DC


Hydromorphone HCl (Dilaudid) 1 mg 1X  ONCE IVP  Last administered on 21at 

20:18;  Start 21 at 20:00;  Stop 21 at 20:11;  Status DC


Hydromorphone HCl (Dilaudid) 2 mg STK-MED ONCE .ROUTE ;  Start 21 at 20:03;

 Stop 21 at 20:03;  Status DC


Hydromorphone HCl (Dilaudid) 1 mg 1X  ONCE IVP  Last administered on 21at 

21:21;  Start 21 at 21:15;  Stop 21 at 21:16;  Status DC


Ondansetron HCl (Zofran) 4 mg PRN Q8HRS  PRN IV NAUSEA/VOMITING;  Start 21 

at 22:00;  Stop 21 at 11:29;  Status DC


Sodium Chloride 1,000 ml @  100 mls/hr Q10H IV  Last administered on 21at 

19:42;  Start 21 at 22:00;  Stop 21 at 21:59;  Status DC


Acetaminophen (Tylenol) 650 mg PRN Q4HRS  PRN PO FEVER > 100.3'F;  Start 21

at 22:00;  Stop 21 at 11:29;  Status DC


Hydromorphone HCl (Dilaudid) 2 mg PRN Q4HRS  PRN IVP PAIN Last administered on 

21at 23:02;  Start 21 at 22:00;  Stop 21 at 23:32;  Status DC


Hydromorphone HCl (Dilaudid) 4 mg PRN Q2HR  PRN IVP MODERATE TO SEVERE PAIN Last

administered on 21at 19:40;  Start 21 at 23:30;  Stop 21 at 

20:45;  Status DC


Ondansetron HCl (Zofran) 4 mg PRN Q6HRS  PRN IVP NAUSEA/VOMITING;  Start 21

at 23:45


Prochlorperazine Edisylate (Compazine) 10 mg PRN Q6HRS  PRN IVP NAUSEA/VOMITING 

Last administered on 21at 05:58;  Start 21 at 23:45


Al Hydroxide/Mg Hydroxide (Mylanta Plus Xs) 30 ml PRN Q3HRS  PRN PO HEARTBURN / 

GAS;  Start 21 at 23:45


Calcium Carbonate/ Glycine (Tums) 500 mg PRN Q3HRS  PRN PO UPSET STOMACH;  Start

21 at 23:45


Acetaminophen (Tylenol) 650 mg PRN Q6HRS  PRN PO Headaches, Temp > 101.5F;  

Start 21 at 23:45


Magnesium Hydroxide (Milk Of Magnesia) 2,400 mg PRN Q12HR  PRN PO CONSTIPATION; 

Start 21 at 23:45


Bisacodyl (Dulcolax Supp) 10 mg PRN DAILY  PRN [place] CONSTIPATION;  Start 21 

at 23:45


Enoxaparin Sodium (Lovenox 40mg Syringe) 40 mg Q24H SQ ;  Start 21 at 23:45


Amlodipine Besylate (Norvasc) 10 mg DAILY PO  Last administered on 21at 

08:34;  Start 21 at 09:00


Cyclobenzaprine HCl (Flexeril) 10 mg PRN TID  PRN PO MUSCLE PAIN Last 

administered on 21at 22:23;  Start 21 at 23:45


Hydrochlorothiazide (Hydrodiuril) 25 mg DAILY PO ;  Start 21 at 09:00;  

Stop 21 at 20:45;  Status DC


Levothyroxine Sodium (Synthroid) 150 mcg DAILY06 PO  Last administered on 

21at 06:51;  Start 21 at 06:00


Lidocaine (Lidoderm) 1 patch PRN DAILY  PRN TP TOPICAL PAIN;  Start 21 at 

23:45


Oxycodone HCl (Roxicodone) 30 mg PRN Q4HRS  PRN PO MODERATE TO SEVERE PAIN Last 

administered on 21at 21:32;  Start 21 at 23:45


Sumatriptan Succinate (Imitrex) 100 mg PRN Q24HRS  PRN PO HEADACHE Last 

administered on 21at 00:20;  Start 21 at 23:45


Losartan Potassium (Cozaar) 100 mg DAILY PO  Last administered on 21at 

08:33;  Start 21 at 09:00


Lorazepam (Ativan Inj) 1 mg PRN Q4HRS  PRN IVP ANXIETY / AGITATION Last 

administered on 21at 19:40;  Start 21 at 23:45;  Stop 21 at 

20:46;  Status DC


Miscellaneous (Lidoderm Patch Removal) 1 ea QHS  ;  Start 21 at 21:00


Pantoprazole Sodium (PROTONIX VIAL for IV PUSH) 40 mg DAILYAC IVP  Last 

administered on 21at 07:59;  Start 21 at 10:45


Morphine Sulfate (Morphine Sulfate) 4 mg 1X  ONCE IV  Last administered on 

21at 14:55;  Start 21 at 14:45;  Stop 21 at 14:46;  Status DC


Morphine Sulfate (Morphine Sulfate) 4 mg STK-MED ONCE .ROUTE ;  Start 21 at

14:42;  Stop 21 at 14:43;  Status DC


Nicotine (Nicoderm Cq 21mg) 1 patch DAILY TD  Last administered on 21at 

08:00;  Start 21 at 17:00


Hydromorphone HCl (Dilaudid) 2 mg PRN Q2HR  PRN IVP MODERATE TO SEVERE PAIN Last

administered on 21at 07:59;  Start 21 at 20:45


Hydralazine HCl (Apresoline Inj) 10 mg PRN Q4HRS  PRN IVP ELEVATED BP, SEE 

COMMENTS Last administered on 21at 03:26;  Start 21 at 03:15





Active Scripts


Active


Pyridium (Phenazopyridine Hcl) 100 Mg Tablet 100 Mg PO TID


Cipro (Ciprofloxacin Hcl) 250 Mg Tablet 1 Tab PO BID


Lidocaine PATCH  ** (Lidocaine) 1 Each Adh..patch 1 Each TP DAILY


     REMOVE AFTER 12 HOURS


Lidocaine PATCH  ** (Lidocaine) 1 Each Adh..patch 1 Each TP DAILY PRN


Diclofenac Sodium 50 Mg Tablet.dr 1 Tab PO BID


Cyclobenzaprine Hcl 10 Mg Tablet 1 Tab PO TID PRN


Bentyl (Dicyclomine Hcl) 10 Mg/1 Ml Ampul 10 Mg IM BID


Cyclobenzaprine Hcl 10 Mg Tablet 1 Tab PO TID PRN


Levaquin (Levofloxacin) 500 Mg Tablet 1 Tab PO DAILY


Macrobid 100 Mg Capsule (Nitrofurantoin Monohyd/M-Cryst) 100 Mg Capsule 1 Cap PO

BID


Cyclobenzaprine Hcl 10 Mg Tablet 1 Tab PO TID 30 Days


Levothyroxine Sodium 150 Mcg Tablet 150 Mcg PO DAILY06 30 Days


Imitrex (Sumatriptan Succinate) 100 Mg Tablet 1 Tab PO UD


Losartan Potassium 100 Mg Tablet 100 Mg PO DAILY 30 Days


Hydrochlorothiazide Tablet  ** (Hydrochlorothiazide) 25 Mg Tablet 1 Tab PO DAILY


Amlodipine Besylate 10 Mg Tablet 10 Mg PO DAILY 30 Days


Reported


Oxycodone Hcl Immed.release ** (Oxycodone Hcl) 5 Mg Tablet 30 Mg PO PRN Q4HRS 

PRN


Vitals/I & O





Vital Sign - Last 24 Hours








 21





 10:28 10:28 11:56 11:59


 


Temp    97.9





    97.9


 


Pulse    68


 


Resp    20


 


B/P (MAP)    120/77 (91)


 


Pulse Ox   98 98


 


O2 Delivery Room Air Room Air Room Air Room Air


 


    





    





 21





 12:37 13:41 15:05 15:59


 


Temp    98.0





    98.0


 


Pulse    92


 


Resp    20


 


B/P (MAP)    146/84 (104)


 


Pulse Ox    96


 


O2 Delivery Room Air Room Air Room Air Room Air


 


    





    





 21





 16:11 16:12 17:42 17:45


 


Pulse Ox    96


 


O2 Delivery Room Air Room Air Room Air Room Air





 21





 19:00 19:48 20:00 20:18


 


Temp 97.8   





 97.8   


 


Pulse 83   


 


Resp 20 18  


 


B/P (MAP) 145/87 (106)   


 


Pulse Ox 100 96  96


 


O2 Delivery Room Air Room Air Room Air Room Air


 


    





    





 21





 21:32 22:23 22:32 22:53


 


Resp   16 16


 


Pulse Ox 96 96  96


 


O2 Delivery Room Air Room Air  Room Air





 21





 23:01 00:51 01:21 03:39


 


Temp 97.6   





 97.6   


 


Pulse 87   


 


Resp  18 16 18


 


B/P (MAP) 150/87 (108)   


 


Pulse Ox 98 98 98 98


 


O2 Delivery Room Air Room Air Room Air Room Air


 


    





    





 21





 03:41 04:09 05:56 06:26


 


Temp 98.2   





 98.2   


 


Pulse 93   


 


Resp 20 16 16 18


 


B/P (MAP) 151/90 (110)   


 


Pulse Ox 94 94 94 


 


O2 Delivery Room Air Room Air Room Air 


 


    





    





 21 





 07:00 07:59 08:29 


 


Temp 98.1   





 98.1   


 


Pulse 49   


 


Resp 18   


 


B/P (MAP) 147/78 (101)   


 


Pulse Ox 94   


 


O2 Delivery Room Air Room Air Room Air 














Intake and Output   


 


 21





 14:59 22:59 06:59


 


Intake Total 440 ml  


 


Balance 440 ml  











Justicifation of Admission Dx:


Justifications for Admission:


Justification of Admission Dx:  Yes











ALEJANDRA YIN MD          2021 09:06

## 2021-01-25 NOTE — PDOC
Date of Service:


DATE: 1/25/21 


TIME: 09:06





Subjective:


Subjective:


Just had pain meds.


Wants real food - tolerated full liquids and "stole some angela crackers" - no 

change in pain w/ eating.





Objective:


Objective:


Nurse says IR called and needed orders.


Vital Signs:





                                   Vital Signs








  Date Time  Temp Pulse Resp B/P (MAP) Pulse Ox O2 Delivery O2 Flow Rate FiO2


 


1/25/21 08:29      Room Air  


 


1/25/21 07:00 98.1 49 18 147/78 (101) 94   





 98.1       








Labs:





Laboratory Tests








Test


 1/25/21


07:10


 


White Blood Count 6.6 x10^3/uL 


 


Red Blood Count 3.72 x10^6/uL 


 


Hemoglobin 12.7 g/dL 


 


Hematocrit 37.9 % 


 


Mean Corpuscular Volume 102 fL 


 


Mean Corpuscular Hemoglobin 34 pg 


 


Mean Corpuscular Hemoglobin


Concent 34 g/dL 





 


Red Cell Distribution Width 15.5 % 


 


Platelet Count 243 x10^3/uL 


 


Neutrophils (%) (Auto) 70 % 


 


Lymphocytes (%) (Auto) 19 % 


 


Monocytes (%) (Auto) 9 % 


 


Eosinophils (%) (Auto) 3 % 


 


Basophils (%) (Auto) 0 % 


 


Neutrophils # (Auto) 4.6 x10^3/uL 


 


Lymphocytes # (Auto) 1.2 x10^3/uL 


 


Monocytes # (Auto) 0.6 x10^3/uL 


 


Eosinophils # (Auto) 0.2 x10^3/uL 


 


Basophils # (Auto) 0.0 x10^3/uL 


 


Prothrombin Time 12.4 SEC 


 


Prothromb Time International


Ratio 1.0 





 


Activated Partial


Thromboplast Time 32 SEC 











  URINE CULTURE  Final  


        Final





        GREATER THAN 100,000 CFU/ML GRAM NEGATIVE RODS on 01/22/21


        at 0810


        FINAL ID= [ESCHERICHIA COLI]


Imaging:


MRCP 1/22


IMPRESSION: A 7.5 cm pseudocyst is seen within the head of the pancreas. Mass 

effect related to this pseudocyst compresses the proximal main pancreatic duct 

and the common bile duct resulting in intra and extrahepatic biliary ductal 

dilatation and gallbladder distention. No choledocholithiasis is seen.





PE:





GEN: NAD


LUNGS: CTAB


HEART: RRR


ABD: NABS, S/ND/NT


NEURO/PSYCH: A & O 3





A/P:


Abd/back pain, pancreatic pseudocyst w/ PD and CBD compression


Abnormal LFTs, elevated 


UTI, hypothyroidism - per primary





--


Cyst aspiration today - see orders.


Will review diet w/ Dr. Andrade.





Justicifation of Admission Dx:


Justifications for Admission:


Justification of Admission Dx:  Yes











MELY MARIEE         Jan 25, 2021 09:14

## 2021-01-25 NOTE — PDOC
TEA BENTLEY APRN 1/25/21 1055:


SURGICAL PROGRESS NOTE


DATE: 1/25/21 


TIME: 10:55


Subjective


down for aspiration of pseudocyst


will FU on results


Vital Signs





Vital Signs








  Date Time  Temp Pulse Resp B/P (MAP) Pulse Ox O2 Delivery O2 Flow Rate FiO2


 


1/25/21 08:29      Room Air  


 


1/25/21 07:00 98.1 49 18 147/78 (101) 94   





 98.1       








I&O











Intake and Output 


 


 1/25/21





 07:00


 


Intake Total 440 ml


 


Balance 440 ml


 


 


 


Intake Oral 440 ml


 


# Voids 2








Labs





Laboratory Tests








Test


 1/24/21


07:50 1/25/21


07:10 1/25/21


09:22


 


White Blood Count


 6.1 x10^3/uL


(4.0-11.0) 6.6 x10^3/uL


(4.0-11.0) 





 


Red Blood Count


 3.91 x10^6/uL


(3.50-5.40) 3.72 x10^6/uL


(3.50-5.40) 





 


Hemoglobin


 13.6 g/dL


(12.0-15.5) 12.7 g/dL


(12.0-15.5) 





 


Hematocrit


 39.8 %


(36.0-47.0) 37.9 %


(36.0-47.0) 





 


Mean Corpuscular Volume


 102 fL


() 102 fL


() 





 


Mean Corpuscular Hemoglobin 35 pg (25-35)  34 pg (25-35)  


 


Mean Corpuscular Hemoglobin


Concent 34 g/dL


(31-37) 34 g/dL


(31-37) 





 


Red Cell Distribution Width


 15.9 %


(11.5-14.5) 15.5 %


(11.5-14.5) 





 


Platelet Count


 271 x10^3/uL


(140-400) 243 x10^3/uL


(140-400) 





 


Neutrophils (%) (Auto) 66 % (31-73)  70 % (31-73)  


 


Lymphocytes (%) (Auto) 24 % (24-48)  19 % (24-48)  


 


Monocytes (%) (Auto) 8 % (0-9)  9 % (0-9)  


 


Eosinophils (%) (Auto) 2 % (0-3)  3 % (0-3)  


 


Basophils (%) (Auto) 1 % (0-3)  0 % (0-3)  


 


Neutrophils # (Auto)


 4.0 x10^3/uL


(1.8-7.7) 4.6 x10^3/uL


(1.8-7.7) 





 


Lymphocytes # (Auto)


 1.4 x10^3/uL


(1.0-4.8) 1.2 x10^3/uL


(1.0-4.8) 





 


Monocytes # (Auto)


 0.5 x10^3/uL


(0.0-1.1) 0.6 x10^3/uL


(0.0-1.1) 





 


Eosinophils # (Auto)


 0.1 x10^3/uL


(0.0-0.7) 0.2 x10^3/uL


(0.0-0.7) 





 


Basophils # (Auto)


 0.0 x10^3/uL


(0.0-0.2) 0.0 x10^3/uL


(0.0-0.2) 





 


Sodium Level


 141 mmol/L


(136-145) 140 mmol/L


(136-145) 





 


Potassium Level


 3.4 mmol/L


(3.5-5.1) 3.5 mmol/L


(3.5-5.1) 





 


Chloride Level


 102 mmol/L


() 102 mmol/L


() 





 


Carbon Dioxide Level


 29 mmol/L


(21-32) 30 mmol/L


(21-32) 





 


Anion Gap 10 (6-14)  8 (6-14)  


 


Blood Urea Nitrogen 5 mg/dL (7-20)  5 mg/dL (7-20)  


 


Creatinine


 0.9 mg/dL


(0.6-1.0) 1.0 mg/dL


(0.6-1.0) 





 


Estimated GFR


(Cockcroft-Gault) 63.4 


 56.2 


 





 


Glucose Level


 103 mg/dL


(70-99) 101 mg/dL


(70-99) 





 


Calcium Level


 9.5 mg/dL


(8.5-10.1) 9.5 mg/dL


(8.5-10.1) 





 


Prothrombin Time


 


 12.4 SEC


(11.7-14.0) 





 


Prothromb Time International


Ratio 


 1.0 (0.8-1.1) 


 





 


Activated Partial


Thromboplast Time 


 32 SEC (24-38) 


 





 


SARS-CoV-2 Antigen (Rapid)


 


 


 Negative


(NEGATIVE)








Laboratory Tests








Test


 1/25/21


07:10 1/25/21


09:22


 


White Blood Count


 6.6 x10^3/uL


(4.0-11.0) 





 


Red Blood Count


 3.72 x10^6/uL


(3.50-5.40) 





 


Hemoglobin


 12.7 g/dL


(12.0-15.5) 





 


Hematocrit


 37.9 %


(36.0-47.0) 





 


Mean Corpuscular Volume


 102 fL


() 





 


Mean Corpuscular Hemoglobin 34 pg (25-35)  


 


Mean Corpuscular Hemoglobin


Concent 34 g/dL


(31-37) 





 


Red Cell Distribution Width


 15.5 %


(11.5-14.5) 





 


Platelet Count


 243 x10^3/uL


(140-400) 





 


Neutrophils (%) (Auto) 70 % (31-73)  


 


Lymphocytes (%) (Auto) 19 % (24-48)  


 


Monocytes (%) (Auto) 9 % (0-9)  


 


Eosinophils (%) (Auto) 3 % (0-3)  


 


Basophils (%) (Auto) 0 % (0-3)  


 


Neutrophils # (Auto)


 4.6 x10^3/uL


(1.8-7.7) 





 


Lymphocytes # (Auto)


 1.2 x10^3/uL


(1.0-4.8) 





 


Monocytes # (Auto)


 0.6 x10^3/uL


(0.0-1.1) 





 


Eosinophils # (Auto)


 0.2 x10^3/uL


(0.0-0.7) 





 


Basophils # (Auto)


 0.0 x10^3/uL


(0.0-0.2) 





 


Prothrombin Time


 12.4 SEC


(11.7-14.0) 





 


Prothromb Time International


Ratio 1.0 (0.8-1.1) 


 





 


Activated Partial


Thromboplast Time 32 SEC (24-38) 


 





 


Sodium Level


 140 mmol/L


(136-145) 





 


Potassium Level


 3.5 mmol/L


(3.5-5.1) 





 


Chloride Level


 102 mmol/L


() 





 


Carbon Dioxide Level


 30 mmol/L


(21-32) 





 


Anion Gap 8 (6-14)  


 


Blood Urea Nitrogen 5 mg/dL (7-20)  


 


Creatinine


 1.0 mg/dL


(0.6-1.0) 





 


Estimated GFR


(Cockcroft-Gault) 56.2 


 





 


Glucose Level


 101 mg/dL


(70-99) 





 


Calcium Level


 9.5 mg/dL


(8.5-10.1) 





 


SARS-CoV-2 Antigen (Rapid)


 


 Negative


(NEGATIVE)








Problem List


Problems


Medical Problems:


(1) Acute on chronic pancreatitis


Status: Acute  





(2) Right upper quadrant abdominal pain with positive Hodge's Sign


Status: Acute  





(3) Transaminitis


Status: Acute  











Justicifation of Admission Dx:


Justifications for Admission:


Justification of Admission Dx:  Yes





MEKHI LEONARD MD 1/25/21 1323:


SURGICAL PROGRESS NOTE


Assessment/Plan


Agree with above











TEA BENTLEY APRN            Jan 25, 2021 10:55


MEKHI LEONARD MD             Jan 25, 2021 13:23

## 2021-01-26 VITALS — SYSTOLIC BLOOD PRESSURE: 145 MMHG | DIASTOLIC BLOOD PRESSURE: 84 MMHG

## 2021-01-26 VITALS — SYSTOLIC BLOOD PRESSURE: 106 MMHG | DIASTOLIC BLOOD PRESSURE: 65 MMHG

## 2021-01-26 VITALS — DIASTOLIC BLOOD PRESSURE: 86 MMHG | SYSTOLIC BLOOD PRESSURE: 132 MMHG

## 2021-01-26 VITALS — SYSTOLIC BLOOD PRESSURE: 137 MMHG | DIASTOLIC BLOOD PRESSURE: 83 MMHG

## 2021-01-26 LAB
ANION GAP SERPL CALC-SCNC: 9 MMOL/L (ref 6–14)
BASOPHILS # BLD AUTO: 0 X10^3/UL (ref 0–0.2)
BASOPHILS NFR BLD: 1 % (ref 0–3)
BUN SERPL-MCNC: 10 MG/DL (ref 7–20)
CALCIUM SERPL-MCNC: 9 MG/DL (ref 8.5–10.1)
CHLORIDE SERPL-SCNC: 101 MMOL/L (ref 98–107)
CO2 SERPL-SCNC: 30 MMOL/L (ref 21–32)
CREAT SERPL-MCNC: 1 MG/DL (ref 0.6–1)
EOSINOPHIL NFR BLD: 0.2 X10^3/UL (ref 0–0.7)
EOSINOPHIL NFR BLD: 3 % (ref 0–3)
ERYTHROCYTE [DISTWIDTH] IN BLOOD BY AUTOMATED COUNT: 16.1 % (ref 11.5–14.5)
GFR SERPLBLD BASED ON 1.73 SQ M-ARVRAT: 56.2 ML/MIN
GLUCOSE SERPL-MCNC: 84 MG/DL (ref 70–99)
HCT VFR BLD CALC: 37.5 % (ref 36–47)
HGB BLD-MCNC: 12.8 G/DL (ref 12–15.5)
LYMPHOCYTES # BLD: 1.5 X10^3/UL (ref 1–4.8)
LYMPHOCYTES NFR BLD AUTO: 29 % (ref 24–48)
MCH RBC QN AUTO: 35 PG (ref 25–35)
MCHC RBC AUTO-ENTMCNC: 34 G/DL (ref 31–37)
MCV RBC AUTO: 102 FL (ref 79–100)
MONO #: 0.4 X10^3/UL (ref 0–1.1)
MONOCYTES NFR BLD: 9 % (ref 0–9)
NEUT #: 3 X10^3/UL (ref 1.8–7.7)
NEUTROPHILS NFR BLD AUTO: 58 % (ref 31–73)
PLATELET # BLD AUTO: 234 X10^3/UL (ref 140–400)
POTASSIUM SERPL-SCNC: 3.6 MMOL/L (ref 3.5–5.1)
RBC # BLD AUTO: 3.68 X10^6/UL (ref 3.5–5.4)
SODIUM SERPL-SCNC: 140 MMOL/L (ref 136–145)
WBC # BLD AUTO: 5.1 X10^3/UL (ref 4–11)

## 2021-01-26 PROCEDURE — 0F9G3ZZ DRAINAGE OF PANCREAS, PERCUTANEOUS APPROACH: ICD-10-PCS | Performed by: RADIOLOGY

## 2021-01-26 RX ADMIN — HYDROMORPHONE HYDROCHLORIDE PRN MG: 2 INJECTION INTRAMUSCULAR; INTRAVENOUS; SUBCUTANEOUS at 01:53

## 2021-01-26 RX ADMIN — HYDROMORPHONE HYDROCHLORIDE PRN MG: 2 INJECTION INTRAMUSCULAR; INTRAVENOUS; SUBCUTANEOUS at 06:32

## 2021-01-26 RX ADMIN — NICOTINE SCH PATCH: 21 PATCH, EXTENDED RELEASE TOPICAL at 08:13

## 2021-01-26 RX ADMIN — HYDROMORPHONE HYDROCHLORIDE PRN MG: 2 INJECTION INTRAMUSCULAR; INTRAVENOUS; SUBCUTANEOUS at 09:39

## 2021-01-26 RX ADMIN — HYDROMORPHONE HYDROCHLORIDE PRN MG: 2 INJECTION INTRAMUSCULAR; INTRAVENOUS; SUBCUTANEOUS at 12:05

## 2021-01-26 RX ADMIN — PROCHLORPERAZINE EDISYLATE PRN MG: 5 INJECTION INTRAMUSCULAR; INTRAVENOUS at 12:04

## 2021-01-26 RX ADMIN — LOSARTAN POTASSIUM SCH MG: 50 TABLET ORAL at 08:12

## 2021-01-26 RX ADMIN — LEVOTHYROXINE SODIUM SCH MCG: 150 TABLET ORAL at 06:31

## 2021-01-26 RX ADMIN — CYCLOBENZAPRINE HYDROCHLORIDE PRN MG: 10 TABLET, FILM COATED ORAL at 12:04

## 2021-01-26 RX ADMIN — HYDROMORPHONE HYDROCHLORIDE PRN MG: 2 INJECTION INTRAMUSCULAR; INTRAVENOUS; SUBCUTANEOUS at 04:35

## 2021-01-26 RX ADMIN — CEFTRIAXONE SCH GM: 1 INJECTION, POWDER, FOR SOLUTION INTRAMUSCULAR; INTRAVENOUS at 14:03

## 2021-01-26 NOTE — PDOC
SURGICAL PROGRESS NOTE


DATE: 1/26/21 


TIME: 10:45


Subjective


says she will go home if they stop iv pain meds, why stay


most of pain is left flank, hx of kidney stones


Vital Signs





Vital Signs








  Date Time  Temp Pulse Resp B/P (MAP) Pulse Ox O2 Delivery O2 Flow Rate FiO2


 


1/26/21 09:41      Room Air  


 


1/26/21 08:13  78  145/84    


 


1/26/21 07:00 98.5  18  96   





 98.5       


 


1/25/21 22:15       2.0 








I&O











Intake and Output 


 


 1/26/21





 07:00


 


Intake Total 2000 ml


 


Output Total 100 ml


 


Balance 1900 ml


 


 


 


Intake Oral 2000 ml


 


Output Urine Total 0 ml


 


Drainage Total 100 ml


 


# Voids 1








General:  Alert, Oriented X3, Cooperative


Abdomen:  Soft


Labs





Laboratory Tests








Test


 1/25/21


07:10 1/25/21


09:22 1/26/21


06:50


 


White Blood Count


 6.6 x10^3/uL


(4.0-11.0) 


 5.1 x10^3/uL


(4.0-11.0)


 


Red Blood Count


 3.72 x10^6/uL


(3.50-5.40) 


 3.68 x10^6/uL


(3.50-5.40)


 


Hemoglobin


 12.7 g/dL


(12.0-15.5) 


 12.8 g/dL


(12.0-15.5)


 


Hematocrit


 37.9 %


(36.0-47.0) 


 37.5 %


(36.0-47.0)


 


Mean Corpuscular Volume


 102 fL


() 


 102 fL


()


 


Mean Corpuscular Hemoglobin 34 pg (25-35)   35 pg (25-35) 


 


Mean Corpuscular Hemoglobin


Concent 34 g/dL


(31-37) 


 34 g/dL


(31-37)


 


Red Cell Distribution Width


 15.5 %


(11.5-14.5) 


 16.1 %


(11.5-14.5)


 


Platelet Count


 243 x10^3/uL


(140-400) 


 234 x10^3/uL


(140-400)


 


Neutrophils (%) (Auto) 70 % (31-73)   58 % (31-73) 


 


Lymphocytes (%) (Auto) 19 % (24-48)   29 % (24-48) 


 


Monocytes (%) (Auto) 9 % (0-9)   9 % (0-9) 


 


Eosinophils (%) (Auto) 3 % (0-3)   3 % (0-3) 


 


Basophils (%) (Auto) 0 % (0-3)   1 % (0-3) 


 


Neutrophils # (Auto)


 4.6 x10^3/uL


(1.8-7.7) 


 3.0 x10^3/uL


(1.8-7.7)


 


Lymphocytes # (Auto)


 1.2 x10^3/uL


(1.0-4.8) 


 1.5 x10^3/uL


(1.0-4.8)


 


Monocytes # (Auto)


 0.6 x10^3/uL


(0.0-1.1) 


 0.4 x10^3/uL


(0.0-1.1)


 


Eosinophils # (Auto)


 0.2 x10^3/uL


(0.0-0.7) 


 0.2 x10^3/uL


(0.0-0.7)


 


Basophils # (Auto)


 0.0 x10^3/uL


(0.0-0.2) 


 0.0 x10^3/uL


(0.0-0.2)


 


Prothrombin Time


 12.4 SEC


(11.7-14.0) 


 





 


Prothromb Time International


Ratio 1.0 (0.8-1.1) 


 


 





 


Activated Partial


Thromboplast Time 32 SEC (24-38) 


 


 





 


Sodium Level


 140 mmol/L


(136-145) 


 140 mmol/L


(136-145)


 


Potassium Level


 3.5 mmol/L


(3.5-5.1) 


 3.6 mmol/L


(3.5-5.1)


 


Chloride Level


 102 mmol/L


() 


 101 mmol/L


()


 


Carbon Dioxide Level


 30 mmol/L


(21-32) 


 30 mmol/L


(21-32)


 


Anion Gap 8 (6-14)   9 (6-14) 


 


Blood Urea Nitrogen


 5 mg/dL (7-20) 


 


 10 mg/dL


(7-20)


 


Creatinine


 1.0 mg/dL


(0.6-1.0) 


 1.0 mg/dL


(0.6-1.0)


 


Estimated GFR


(Cockcroft-Gault) 56.2 


 


 56.2 





 


Glucose Level


 101 mg/dL


(70-99) 


 84 mg/dL


(70-99)


 


Calcium Level


 9.5 mg/dL


(8.5-10.1) 


 9.0 mg/dL


(8.5-10.1)


 


SARS-CoV-2 Antigen (Rapid)


 


 Negative


(NEGATIVE) 











Laboratory Tests








Test


 1/26/21


06:50


 


White Blood Count


 5.1 x10^3/uL


(4.0-11.0)


 


Red Blood Count


 3.68 x10^6/uL


(3.50-5.40)


 


Hemoglobin


 12.8 g/dL


(12.0-15.5)


 


Hematocrit


 37.5 %


(36.0-47.0)


 


Mean Corpuscular Volume


 102 fL


()


 


Mean Corpuscular Hemoglobin 35 pg (25-35) 


 


Mean Corpuscular Hemoglobin


Concent 34 g/dL


(31-37)


 


Red Cell Distribution Width


 16.1 %


(11.5-14.5)


 


Platelet Count


 234 x10^3/uL


(140-400)


 


Neutrophils (%) (Auto) 58 % (31-73) 


 


Lymphocytes (%) (Auto) 29 % (24-48) 


 


Monocytes (%) (Auto) 9 % (0-9) 


 


Eosinophils (%) (Auto) 3 % (0-3) 


 


Basophils (%) (Auto) 1 % (0-3) 


 


Neutrophils # (Auto)


 3.0 x10^3/uL


(1.8-7.7)


 


Lymphocytes # (Auto)


 1.5 x10^3/uL


(1.0-4.8)


 


Monocytes # (Auto)


 0.4 x10^3/uL


(0.0-1.1)


 


Eosinophils # (Auto)


 0.2 x10^3/uL


(0.0-0.7)


 


Basophils # (Auto)


 0.0 x10^3/uL


(0.0-0.2)


 


Sodium Level


 140 mmol/L


(136-145)


 


Potassium Level


 3.6 mmol/L


(3.5-5.1)


 


Chloride Level


 101 mmol/L


()


 


Carbon Dioxide Level


 30 mmol/L


(21-32)


 


Anion Gap 9 (6-14) 


 


Blood Urea Nitrogen


 10 mg/dL


(7-20)


 


Creatinine


 1.0 mg/dL


(0.6-1.0)


 


Estimated GFR


(Cockcroft-Gault) 56.2 





 


Glucose Level


 84 mg/dL


(70-99)


 


Calcium Level


 9.0 mg/dL


(8.5-10.1)








Problem List


Problems


Medical Problems:


(1) Acute on chronic pancreatitis


Status: Acute  





(2) Right upper quadrant abdominal pain with positive Hodge's Sign


Status: Acute  





(3) Transaminitis


Status: Acute  








Assessment/Plan


pancreatic cyst


fluid analysis pending


GI following


no current surgical plans





Justicifation of Admission Dx:


Justifications for Admission:


Justification of Admission Dx:  Yes











TEA BENTLEY APRN            Jan 26, 2021 10:47

## 2021-01-26 NOTE — PDOC
PROGRESS NOTES


Date of Service:


DATE: 1/26/21 


TIME: 06:52





Chief Complaint


Chief Complaint


-=========hospital d/c summary =======================








DATE OF ADMIT  01-20-21





DATE OF D/C  01-26-21





CONSULTATIONS


GI, GENERAL SURGERY





COMPLICATIONS   NONE





D/C CONDITION GOOD





PROCEDURES  CT-guided aspiration of probable pancreatic pseudocyst 1-25 








TO SEE GI IN ONE WEEK  // , PCP IN ONE WEEK














discharge dx ===============================





Acute on chronic pancreatitis


Pancreatic pseudocyst


Dilated gallbladder vs cholecystitis


Transaminitis


UTI E COLI, IV ROCEPHIN  CHG PO AUGMENTIN ON D/C 





intractable back and abdominal pain








Plan:





SOFT DIET


GI following OK WITH D/C 1-26 


Follow urine output


Consult general surgery


No gallstones seen on ultrasound; will defer to general surgery for 

recommendations on HIDA scan


Dilaudid IV as needed for pain, STOPPED 1/26


Resume home medications


PPX  - Lovenox


FULL CODE


Dispo - inpatient for above


Fluid amylase still pending.  








d/w RN  PT DESIRES TO GO HOME TODAY








D/C PLANNING   33  MIN





History of Present Illness


History of Present Illness


Patient 60-year-old female presented  to the ER with complaint of left upper 

quadrant abdominal pain.  She was seen in the ED yesterday for similar symptoms,

but left AMA.  CT abdomen/pelvis at that time showed acute on chronic hepatitis 

with 7.8 cm pancreatic pseudocyst and distended gallbladder.  She returned to 

the ER  the behest of her PCP.  She reports abdominal pain, 10/10.








1/25 drainage of pseudocyst today  c/o persistent pain


D/W RN 





1-26 IMPROVED, HOME TODAY 








1/24: Afebrile.  Still complains of abdominal pain.  Plan for IR to biopsy and 

potentially drain pseudocyst on Monday.  Continue full liquid diet.





1/23: Discussed results MRCP with patient.  She has 7.5 cm pseudocyst is seen 

within the head of the pancreas, mass effect compressing the proximal main 

pancreatic duct and the common bile duct resulting in intra and extrahepatic 

biliary ductal dilatation and gallbladder distention and consulted 

interventional radiology to try to biopsy and aspirate pseudocyst.  This will 

take place Monday.  Patient reports 8/10 abdominal pain, requesting regular 

diet.  Discussed this would not be consistent with appropriate medical 

management pancreatitis.  Continue full liquid diet.





1/22: HIDA scan yesterday showing slow passage of radiotracer into the small 

bowel without evidence of a high-grade bile duct obstruction.  Continue 

treatment of pancreatitis with bowel rest and IV fluids.  Follow for resolution 

of pancreatic pseudocyst.  Will consult IR to see about sampling pancreatic 

pseudocyst.  She is scheduled for MRCP today.  Patient does admits to improperly

taking her levothyroxine with her other home medications.  Discussed taking 

levothyroxine as first medication in the morning at least 1 hour prior to any 

other food or medications.





Vitals


Vitals





Vital Signs








  Date Time  Temp Pulse Resp B/P (MAP) Pulse Ox O2 Delivery O2 Flow Rate FiO2


 


1/26/21 06:32     95 Room Air  


 


1/26/21 04:35   20     


 


1/26/21 03:00 98.2 80  132/86 (101)    





 98.2       


 


1/25/21 22:15       2.0 











Physical Exam


General:  Alert, Oriented X3, Cooperative, No acute distress, mild distress


Heart:  Regular rate, Normal S1, Normal S2


Lungs:  Clear


Abdomen:  Normal bowel sounds, Soft, Other (Tender to the epigastrium)


Extremities:  No clubbing, No cyanosis


Skin:  No rashes, No breakdown





Labs


LABS





1/26/2021 11:02 AM





Procedure: CT-guided aspiration probable pancreatic pseudocysts pseudocyst





Clinical Indication:   PANCREATIC PSEUDOCYST





Discussion:


 


 The procedure was explained in its entirety to the patient or the patients


designated representative by a member of the treatment team, including a


discussion of the risks, benefits and commonly accepted alternatives to the


procedure, as well as the expected consequences of no therapy whatsoever.  


Discussion of the risks included, but was not limited to, those that are most


frequent and those that are rare but possibly severe or life-threatening, as


well as the possibility of unforeseen complications.





  All elements of maximal sterile barrier technique including the use of a


cap, mask, sterile gown, sterile gloves, large sterile sheet, appropriate hand


hygiene, and 2% chlorhexidine for cutaneous antisepsis (or acceptable


alternative antiseptic per current guidelines) were followed for this


procedure.





The patient was brought to the CT scanner and placed in the supine position. A


timeout procedure was performed. CT imaging was performed redemonstrating


previously seen pancreatic pseudocyst. The overlying anterior abdomen was


prepped and draped using sterile barrier technique as described above. 1%


lidocaine was administered for local anesthesia. Under intermittent CT


guidance a 22-gauge needle was advanced into the cyst. Cyst contents were


aspirated. Approximately 100 cc of fluid was removed. This fluid was sent for


further evaluation per ordering physician request. The needle was removed.


Manual pressure was held. Repeat CT demonstrates no significant hemorrhage or


other complication. Patient tolerated the procedure well.





The procedures performed under conscious sedation including continuous


cardiopulmonary monitoring via dedicated sedation nurse. 


Face-to-face sedation time: 26 minutes





Impression: CT-guided aspiration of probable pancreatic pseudocyst











PQRS Compliance Statement:





One or more of the following individualized dose reduction techniques were


utilized for this examination:  


1. Automated exposure control  


2. Adjustment of the mA and/or kV according to patient size  


3. Use of iterative reconstruction technique














DICTATED and SIGNED BY:     CHRISTAL RICHARDSON MD


DATE:     01/26/21 [id number]





Laboratory Tests








Test


 1/25/21


07:10 1/25/21


09:22


 


White Blood Count


 6.6 x10^3/uL


(4.0-11.0) 





 


Red Blood Count


 3.72 x10^6/uL


(3.50-5.40) 





 


Hemoglobin


 12.7 g/dL


(12.0-15.5) 





 


Hematocrit


 37.9 %


(36.0-47.0) 





 


Mean Corpuscular Volume


 102 fL


() 





 


Mean Corpuscular Hemoglobin 34 pg (25-35)  


 


Mean Corpuscular Hemoglobin


Concent 34 g/dL


(31-37) 





 


Red Cell Distribution Width


 15.5 %


(11.5-14.5) 





 


Platelet Count


 243 x10^3/uL


(140-400) 





 


Neutrophils (%) (Auto) 70 % (31-73)  


 


Lymphocytes (%) (Auto) 19 % (24-48)  


 


Monocytes (%) (Auto) 9 % (0-9)  


 


Eosinophils (%) (Auto) 3 % (0-3)  


 


Basophils (%) (Auto) 0 % (0-3)  


 


Neutrophils # (Auto)


 4.6 x10^3/uL


(1.8-7.7) 





 


Lymphocytes # (Auto)


 1.2 x10^3/uL


(1.0-4.8) 





 


Monocytes # (Auto)


 0.6 x10^3/uL


(0.0-1.1) 





 


Eosinophils # (Auto)


 0.2 x10^3/uL


(0.0-0.7) 





 


Basophils # (Auto)


 0.0 x10^3/uL


(0.0-0.2) 





 


Prothrombin Time


 12.4 SEC


(11.7-14.0) 





 


Prothromb Time International


Ratio 1.0 (0.8-1.1) 


 





 


Activated Partial


Thromboplast Time 32 SEC (24-38) 


 





 


Sodium Level


 140 mmol/L


(136-145) 





 


Potassium Level


 3.5 mmol/L


(3.5-5.1) 





 


Chloride Level


 102 mmol/L


() 





 


Carbon Dioxide Level


 30 mmol/L


(21-32) 





 


Anion Gap 8 (6-14)  


 


Blood Urea Nitrogen 5 mg/dL (7-20)  


 


Creatinine


 1.0 mg/dL


(0.6-1.0) 





 


Estimated GFR


(Cockcroft-Gault) 56.2 


 





 


Glucose Level


 101 mg/dL


(70-99) 





 


Calcium Level


 9.5 mg/dL


(8.5-10.1) 





 


SARS-CoV-2 Antigen (Rapid)


 


 Negative


(NEGATIVE)











Assessment and Plan


Assessmemt and Plan


Problems


Medical Problems:


(1) Acute on chronic pancreatitis


Status: Acute  





(2) Right upper quadrant abdominal pain with positive Hodge's Sign


Status: Acute  





(3) Transaminitis


Status: Acute  











Comment


Review of Relevant


I have reviewed the following items raffi (where applicable) has been applied.


Labs





Laboratory Tests








Test


 1/24/21


07:50 1/25/21


07:10 1/25/21


09:22


 


White Blood Count


 6.1 x10^3/uL


(4.0-11.0) 6.6 x10^3/uL


(4.0-11.0) 





 


Red Blood Count


 3.91 x10^6/uL


(3.50-5.40) 3.72 x10^6/uL


(3.50-5.40) 





 


Hemoglobin


 13.6 g/dL


(12.0-15.5) 12.7 g/dL


(12.0-15.5) 





 


Hematocrit


 39.8 %


(36.0-47.0) 37.9 %


(36.0-47.0) 





 


Mean Corpuscular Volume


 102 fL


() 102 fL


() 





 


Mean Corpuscular Hemoglobin 35 pg (25-35)  34 pg (25-35)  


 


Mean Corpuscular Hemoglobin


Concent 34 g/dL


(31-37) 34 g/dL


(31-37) 





 


Red Cell Distribution Width


 15.9 %


(11.5-14.5) 15.5 %


(11.5-14.5) 





 


Platelet Count


 271 x10^3/uL


(140-400) 243 x10^3/uL


(140-400) 





 


Neutrophils (%) (Auto) 66 % (31-73)  70 % (31-73)  


 


Lymphocytes (%) (Auto) 24 % (24-48)  19 % (24-48)  


 


Monocytes (%) (Auto) 8 % (0-9)  9 % (0-9)  


 


Eosinophils (%) (Auto) 2 % (0-3)  3 % (0-3)  


 


Basophils (%) (Auto) 1 % (0-3)  0 % (0-3)  


 


Neutrophils # (Auto)


 4.0 x10^3/uL


(1.8-7.7) 4.6 x10^3/uL


(1.8-7.7) 





 


Lymphocytes # (Auto)


 1.4 x10^3/uL


(1.0-4.8) 1.2 x10^3/uL


(1.0-4.8) 





 


Monocytes # (Auto)


 0.5 x10^3/uL


(0.0-1.1) 0.6 x10^3/uL


(0.0-1.1) 





 


Eosinophils # (Auto)


 0.1 x10^3/uL


(0.0-0.7) 0.2 x10^3/uL


(0.0-0.7) 





 


Basophils # (Auto)


 0.0 x10^3/uL


(0.0-0.2) 0.0 x10^3/uL


(0.0-0.2) 





 


Sodium Level


 141 mmol/L


(136-145) 140 mmol/L


(136-145) 





 


Potassium Level


 3.4 mmol/L


(3.5-5.1) 3.5 mmol/L


(3.5-5.1) 





 


Chloride Level


 102 mmol/L


() 102 mmol/L


() 





 


Carbon Dioxide Level


 29 mmol/L


(21-32) 30 mmol/L


(21-32) 





 


Anion Gap 10 (6-14)  8 (6-14)  


 


Blood Urea Nitrogen 5 mg/dL (7-20)  5 mg/dL (7-20)  


 


Creatinine


 0.9 mg/dL


(0.6-1.0) 1.0 mg/dL


(0.6-1.0) 





 


Estimated GFR


(Cockcroft-Gault) 63.4 


 56.2 


 





 


Glucose Level


 103 mg/dL


(70-99) 101 mg/dL


(70-99) 





 


Calcium Level


 9.5 mg/dL


(8.5-10.1) 9.5 mg/dL


(8.5-10.1) 





 


Prothrombin Time


 


 12.4 SEC


(11.7-14.0) 





 


Prothromb Time International


Ratio 


 1.0 (0.8-1.1) 


 





 


Activated Partial


Thromboplast Time 


 32 SEC (24-38) 


 





 


SARS-CoV-2 Antigen (Rapid)


 


 


 Negative


(NEGATIVE)








Laboratory Tests








Test


 1/25/21


07:10 1/25/21


09:22


 


White Blood Count


 6.6 x10^3/uL


(4.0-11.0) 





 


Red Blood Count


 3.72 x10^6/uL


(3.50-5.40) 





 


Hemoglobin


 12.7 g/dL


(12.0-15.5) 





 


Hematocrit


 37.9 %


(36.0-47.0) 





 


Mean Corpuscular Volume


 102 fL


() 





 


Mean Corpuscular Hemoglobin 34 pg (25-35)  


 


Mean Corpuscular Hemoglobin


Concent 34 g/dL


(31-37) 





 


Red Cell Distribution Width


 15.5 %


(11.5-14.5) 





 


Platelet Count


 243 x10^3/uL


(140-400) 





 


Neutrophils (%) (Auto) 70 % (31-73)  


 


Lymphocytes (%) (Auto) 19 % (24-48)  


 


Monocytes (%) (Auto) 9 % (0-9)  


 


Eosinophils (%) (Auto) 3 % (0-3)  


 


Basophils (%) (Auto) 0 % (0-3)  


 


Neutrophils # (Auto)


 4.6 x10^3/uL


(1.8-7.7) 





 


Lymphocytes # (Auto)


 1.2 x10^3/uL


(1.0-4.8) 





 


Monocytes # (Auto)


 0.6 x10^3/uL


(0.0-1.1) 





 


Eosinophils # (Auto)


 0.2 x10^3/uL


(0.0-0.7) 





 


Basophils # (Auto)


 0.0 x10^3/uL


(0.0-0.2) 





 


Prothrombin Time


 12.4 SEC


(11.7-14.0) 





 


Prothromb Time International


Ratio 1.0 (0.8-1.1) 


 





 


Activated Partial


Thromboplast Time 32 SEC (24-38) 


 





 


Sodium Level


 140 mmol/L


(136-145) 





 


Potassium Level


 3.5 mmol/L


(3.5-5.1) 





 


Chloride Level


 102 mmol/L


() 





 


Carbon Dioxide Level


 30 mmol/L


(21-32) 





 


Anion Gap 8 (6-14)  


 


Blood Urea Nitrogen 5 mg/dL (7-20)  


 


Creatinine


 1.0 mg/dL


(0.6-1.0) 





 


Estimated GFR


(Cockcroft-Gault) 56.2 


 





 


Glucose Level


 101 mg/dL


(70-99) 





 


Calcium Level


 9.5 mg/dL


(8.5-10.1) 





 


SARS-CoV-2 Antigen (Rapid)


 


 Negative


(NEGATIVE)








Microbiology


1/21/21 Urine Culture - Final, Complete


          


1/21/21 Antimicrobic Susceptibility - Final, Complete


Medications





Current Medications


Sodium Chloride 1,000 ml @  1,000 mls/hr Q1H IV  Last administered on 1/20/21at 

20:15;  Start 1/20/21 at 20:00;  Stop 1/20/21 at 20:59;  Status DC


Sodium Chloride 1,000 ml @  250 mls/hr Q4H IV  Last administered on 1/20/21at 

21:00;  Start 1/20/21 at 20:00;  Stop 1/21/21 at 02:05;  Status DC


Hydromorphone HCl (Dilaudid) 1 mg 1X  ONCE IVP  Last administered on 1/20/21at 

20:18;  Start 1/20/21 at 20:00;  Stop 1/20/21 at 20:11;  Status DC


Hydromorphone HCl (Dilaudid) 2 mg STK-MED ONCE .ROUTE ;  Start 1/20/21 at 20:03;

 Stop 1/20/21 at 20:03;  Status DC


Hydromorphone HCl (Dilaudid) 1 mg 1X  ONCE IVP  Last administered on 1/20/21at 

21:21;  Start 1/20/21 at 21:15;  Stop 1/20/21 at 21:16;  Status DC


Ondansetron HCl (Zofran) 4 mg PRN Q8HRS  PRN IV NAUSEA/VOMITING;  Start 1/20/21 

at 22:00;  Stop 1/21/21 at 11:29;  Status DC


Sodium Chloride 1,000 ml @  100 mls/hr Q10H IV  Last administered on 1/21/21at 

19:42;  Start 1/20/21 at 22:00;  Stop 1/21/21 at 21:59;  Status DC


Acetaminophen (Tylenol) 650 mg PRN Q4HRS  PRN PO FEVER > 100.3'F;  Start 1/20/21

at 22:00;  Stop 1/21/21 at 11:29;  Status DC


Hydromorphone HCl (Dilaudid) 2 mg PRN Q4HRS  PRN IVP PAIN Last administered on 

1/20/21at 23:02;  Start 1/20/21 at 22:00;  Stop 1/20/21 at 23:32;  Status DC


Hydromorphone HCl (Dilaudid) 4 mg PRN Q2HR  PRN IVP MODERATE TO SEVERE PAIN Last

administered on 1/21/21at 19:40;  Start 1/20/21 at 23:30;  Stop 1/21/21 at 

20:45;  Status DC


Ondansetron HCl (Zofran) 4 mg PRN Q6HRS  PRN IVP NAUSEA/VOMITING;  Start 1/20/21

at 23:45


Prochlorperazine Edisylate (Compazine) 10 mg PRN Q6HRS  PRN IVP NAUSEA/VOMITING 

Last administered on 1/25/21at 18:58;  Start 1/20/21 at 23:45


Al Hydroxide/Mg Hydroxide (Mylanta Plus Xs) 30 ml PRN Q3HRS  PRN PO HEARTBURN / 

GAS;  Start 1/20/21 at 23:45


Calcium Carbonate/ Glycine (Tums) 500 mg PRN Q3HRS  PRN PO UPSET STOMACH;  Start

1/20/21 at 23:45


Acetaminophen (Tylenol) 650 mg PRN Q6HRS  PRN PO Headaches, Temp > 101.5F;  

Start 1/20/21 at 23:45


Magnesium Hydroxide (Milk Of Magnesia) 2,400 mg PRN Q12HR  PRN PO CONSTIPATION; 

Start 1/20/21 at 23:45


Bisacodyl (Dulcolax Supp) 10 mg PRN DAILY  PRN MO CONSTIPATION;  Start 1/20/21 

at 23:45


Enoxaparin Sodium (Lovenox 40mg Syringe) 40 mg Q24H SQ ;  Start 1/20/21 at 23:45


Amlodipine Besylate (Norvasc) 10 mg DAILY PO  Last administered on 1/24/21at 

08:34;  Start 1/21/21 at 09:00


Cyclobenzaprine HCl (Flexeril) 10 mg PRN TID  PRN PO MUSCLE PAIN Last 

administered on 1/24/21at 22:23;  Start 1/20/21 at 23:45


Hydrochlorothiazide (Hydrodiuril) 25 mg DAILY PO ;  Start 1/21/21 at 09:00;  

Stop 1/21/21 at 20:45;  Status DC


Levothyroxine Sodium (Synthroid) 150 mcg DAILY06 PO  Last administered on 

1/26/21at 06:31;  Start 1/21/21 at 06:00


Lidocaine (Lidoderm) 1 patch PRN DAILY  PRN TP TOPICAL PAIN;  Start 1/20/21 at 

23:45


Oxycodone HCl (Roxicodone) 30 mg PRN Q4HRS  PRN PO MODERATE TO SEVERE PAIN Last 

administered on 1/26/21at 03:18;  Start 1/20/21 at 23:45


Sumatriptan Succinate (Imitrex) 100 mg PRN Q24HRS  PRN PO HEADACHE Last 

administered on 1/24/21at 00:20;  Start 1/20/21 at 23:45


Losartan Potassium (Cozaar) 100 mg DAILY PO  Last administered on 1/24/21at 

08:33;  Start 1/21/21 at 09:00


Lorazepam (Ativan Inj) 1 mg PRN Q4HRS  PRN IVP ANXIETY / AGITATION Last 

administered on 1/21/21at 19:40;  Start 1/20/21 at 23:45;  Stop 1/21/21 at 

20:46;  Status DC


Miscellaneous (Lidoderm Patch Removal) 1 ea QHS MC ;  Start 1/22/21 at 21:00


Pantoprazole Sodium (PROTONIX VIAL for IV PUSH) 40 mg DAILYAC IVP  Last 

administered on 1/25/21at 07:59;  Start 1/21/21 at 10:45;  Stop 1/25/21 at 

09:14;  Status DC


Morphine Sulfate (Morphine Sulfate) 4 mg 1X  ONCE IV  Last administered on 

1/21/21at 14:55;  Start 1/21/21 at 14:45;  Stop 1/21/21 at 14:46;  Status DC


Morphine Sulfate (Morphine Sulfate) 4 mg STK-MED ONCE .ROUTE ;  Start 1/21/21 at

14:42;  Stop 1/21/21 at 14:43;  Status DC


Nicotine (Nicoderm Cq 21mg) 1 patch DAILY TD  Last administered on 1/25/21at 

08:00;  Start 1/21/21 at 17:00


Hydromorphone HCl (Dilaudid) 2 mg PRN Q2HR  PRN IVP MODERATE TO SEVERE PAIN Last

administered on 1/26/21at 06:32;  Start 1/21/21 at 20:45


Hydralazine HCl (Apresoline Inj) 10 mg PRN Q4HRS  PRN IVP ELEVATED BP, SEE 

COMMENTS Last administered on 1/22/21at 03:26;  Start 1/22/21 at 03:15


Pantoprazole Sodium (Protonix) 40 mg DAILYAC PO ;  Start 1/26/21 at 07:30


Ceftriaxone Sodium (Rocephin) 1 gm Q24H IVP  Last administered on 1/25/21at 

14:23;  Start 1/25/21 at 14:00


Lidocaine HCl (Buffered Lidocaine 1%) 3 ml STK-MED ONCE .ROUTE ;  Start 1/25/21 

at 10:45;  Stop 1/25/21 at 10:45;  Status DC


Midazolam HCl (Versed) 2 mg STK-MED ONCE .ROUTE ;  Start 1/25/21 at 10:47;  Stop

1/25/21 at 10:48;  Status DC


Fentanyl Citrate (Fentanyl 2ml Vial) 100 mcg STK-MED ONCE .ROUTE ;  Start 

1/25/21 at 10:48;  Stop 1/25/21 at 10:48;  Status DC


Lidocaine HCl (Buffered Lidocaine 1%) 3 ml 1X  ONCE IJ  Last administered on 

1/25/21at 11:34;  Start 1/25/21 at 11:00;  Stop 1/25/21 at 11:01;  Status DC


Fentanyl Citrate (Fentanyl 2ml Vial) 100 mcg 1X  ONCE IV  Last administered on 

1/25/21at 11:35;  Start 1/25/21 at 11:00;  Stop 1/25/21 at 11:01;  Status DC


Midazolam HCl (Versed) 2 mg 1X  ONCE IV  Last administered on 1/25/21at 11:34;  

Start 1/25/21 at 11:00;  Stop 1/25/21 at 11:01;  Status DC





Active Scripts


Active


Pyridium (Phenazopyridine Hcl) 100 Mg Tablet 100 Mg PO TID


Cipro (Ciprofloxacin Hcl) 250 Mg Tablet 1 Tab PO BID


Lidocaine PATCH  ** (Lidocaine) 1 Each Adh..patch 1 Each TP DAILY


     REMOVE AFTER 12 HOURS


Lidocaine PATCH  ** (Lidocaine) 1 Each Adh..patch 1 Each TP DAILY PRN


Diclofenac Sodium 50 Mg Tablet.dr 1 Tab PO BID


Cyclobenzaprine Hcl 10 Mg Tablet 1 Tab PO TID PRN


Bentyl (Dicyclomine Hcl) 10 Mg/1 Ml Ampul 10 Mg IM BID


Cyclobenzaprine Hcl 10 Mg Tablet 1 Tab PO TID PRN


Levaquin (Levofloxacin) 500 Mg Tablet 1 Tab PO DAILY


Macrobid 100 Mg Capsule (Nitrofurantoin Monohyd/M-Cryst) 100 Mg Capsule 1 Cap PO

BID


Cyclobenzaprine Hcl 10 Mg Tablet 1 Tab PO TID 30 Days


Levothyroxine Sodium 150 Mcg Tablet 150 Mcg PO DAILY06 30 Days


Imitrex (Sumatriptan Succinate) 100 Mg Tablet 1 Tab PO UD


Losartan Potassium 100 Mg Tablet 100 Mg PO DAILY 30 Days


Hydrochlorothiazide Tablet  ** (Hydrochlorothiazide) 25 Mg Tablet 1 Tab PO DAILY


Amlodipine Besylate 10 Mg Tablet 10 Mg PO DAILY 30 Days


Reported


Oxycodone Hcl Immed.release ** (Oxycodone Hcl) 5 Mg Tablet 30 Mg PO PRN Q4HRS 

PRN


Vitals/I & O





Vital Sign - Last 24 Hours








 1/25/21 1/25/21 1/25/21 1/25/21





 07:00 07:59 08:00 08:29


 


Temp 98.1   





 98.1   


 


Pulse 49   


 


Resp 18   


 


B/P (MAP) 147/78 (101)   


 


Pulse Ox 94   


 


O2 Delivery Room Air Room Air Room Air Room Air


 


    





    





 1/25/21 1/25/21 1/25/21 1/25/21





 10:15 10:45 11:00 11:10


 


Temp   98.3 





   98.3 


 


Pulse   66 68


 


Resp   18 19


 


B/P (MAP)   156/84 (108) 


 


Pulse Ox   97 99


 


O2 Delivery Room Air Room Air Room Air Nasal Cannula


 


O2 Flow Rate    2.0


 


    





    





 1/25/21 1/25/21 1/25/21 1/25/21





 11:15 11:20 11:25 11:30


 


Pulse 69 68 70 70


 


Resp 12 16 17 19


 


Pulse Ox 98 98 98 98


 


O2 Delivery Nasal Cannula Nasal Cannula Nasal Cannula Nasal Cannula


 


O2 Flow Rate 2.0 2.0 2.0 2.0





 1/25/21 1/25/21 1/25/21 1/25/21





 11:35 12:34 13:34 14:22


 


Resp 19   


 


Pulse Ox 98   


 


O2 Delivery Nasal Cannula Room Air Room Air Room Air


 


O2 Flow Rate 2.0   





 1/25/21 1/25/21 1/25/21 1/25/21





 14:52 15:00 16:40 17:10


 


Temp  97.9  





  97.9  


 


Pulse  90  


 


Resp  18  


 


B/P (MAP)  129/75 (93)  


 


Pulse Ox  97  


 


O2 Delivery Room Air Room Air Room Air Room Air


 


    





    





 1/25/21 1/25/21 1/25/21 1/25/21





 17:48 18:48 18:55 19:00


 


Temp    98.0





    98.0


 


Pulse    91


 


Resp    18


 


B/P (MAP)    129/82 (98)


 


Pulse Ox    96


 


O2 Delivery Room Air Room Air Room Air Room Air


 


    





    





 1/25/21 1/25/21 1/25/21 1/25/21





 19:30 20:00 21:43 22:15


 


Resp 20   20


 


Pulse Ox 96  96 96


 


O2 Delivery Room Air Room Air Room Air Room Air


 


O2 Flow Rate 2.0  2.0 2.0





 1/25/21 1/26/21 1/26/21 1/26/21





 23:53 00:40 01:53 02:23


 


Resp 20  20 


 


Pulse Ox 96  96 96


 


O2 Delivery Room Air Room Air Room Air Room Air





 1/26/21 1/26/21 1/26/21 1/26/21





 03:00 03:18 04:35 05:21


 


Temp 98.2   





 98.2   


 


Pulse 80   


 


Resp 18 20 20 


 


B/P (MAP) 132/86 (101)   


 


Pulse Ox 94   


 


O2 Delivery Room Air Room Air Room Air Room Air


 


    





    





 1/26/21 1/26/21  





 05:22 06:32  


 


Pulse Ox 94 95  


 


O2 Delivery Room Air Room Air  














Intake and Output   


 


 1/25/21 1/25/21 1/26/21





 15:00 23:00 07:00


 


Intake Total 400 ml  1600 ml


 


Output Total 100 ml  0 ml


 


Balance 300 ml  1600 ml











Justicifation of Admission Dx:


Justifications for Admission:


Justification of Admission Dx:  Yes











ALEJANDRA YIN MD          Jan 26, 2021 06:52

## 2021-01-26 NOTE — NUR
SW following. Discussed with RN, pt from home with spouse, room air, GI soft diet, rapid 
COVID-19 negative. Iv pain meds have been stopped. [facility] following for self pay status. 
RN anticipates discharge home in the next day or two. SW will continue to follow.

## 2021-01-26 NOTE — NUR
Discharge Note:

Patient was discharged home with self care. Patients IV was discontinued without any 
complications per RN. Patient ready to be discharged. Patient was given discharge 
summary/instructions, follow-ups, and educational material. Patients prescriptions were sent 
to patients preferred pharmacy. Patient was in a hurry to get out of the hospital that she 
cut the discharge education short and stated "I just need to know where to sign." Patient 
ambulated to the ER entrance with all personal belongings accompanied by ASCHI Ornelas, where 
patients car was sitting since she drove herself to the ER.

## 2021-01-26 NOTE — PATHOLOGY
Note

[facility] Accession Number: [id number]

   TESTS               RESULT  FLAG  UNITS    REF RANGE  LAB

------------------------------------------------------------

   Clinician Provided Cytology Information

   No. of containers..01 Other (Miscellaneous)

Source:                                                   01

   PANC PSEUDO CYST

DIAGNOSIS:                                                02

   PANC PSEUDO CYST

   NEGATIVE FOR MALIGNANT CELLS.

   ACUTE AND CHRONIC INFLAMMATORY CELLS, HISTIOCYTES, HEMOSIDERIN-LADEN

   MACROPHAGES, HEMATOIDIN, AND RED BLOOD CELLS PRESENT.

   THIS INTERPRETATION INCLUDES EVALUATION OF A CELL BLOCK.

Signed out by:                                            02

   Manoj Campuzano MD, Pathologist

   NPI- [id number]

Performed by:                                             01

   Valerie Gar, Cytotechnologist ([facility])

Gross description:                                        01

   20ML, DARK BROWN, 1 TP 1 CB

   /LCS  01/25/2021  1533 Local



------------------------------------------------------------

    FLAG LEGEND:

    L-Low Normal,H-High Normal,LL-Alert Low,HH-Alert High

    <-Panic Low,>-Panic High,A-Abnormal,AA-Critical Abnormal

------------------------------------------------------------



Performed at:

[address] [facility]

   [address]

   [place]  [address]

   Sukhjinder Anand MD, Phone: [phone]

[address] [facility]

   [address] [place]  [address]

   Manoj Campuzano MD, Phone: [phone]

Specimen Comment: A courtesy copy of this report has been sent to [phone], [phone]

Specimen Comment: [id number], [phone], [phone]

Specimen Comment: Report sent to ,DR LEONARD,DR MICHELE / DR HENRIQUEZ

Specimen Comment: A duplicate report has been generated due to demographic updates.

***Performed at:  01

   [address], [place], [place]  [id number]

   MD Sukhjinder Anand MD Phone:  [phone]

## 2021-01-26 NOTE — RAD
1/26/2021 11:02 AM



Procedure: CT-guided aspiration probable pancreatic pseudocysts pseudocyst



Clinical Indication:   PANCREATIC PSEUDOCYST



Discussion:

 

 The procedure was explained in its entirety to the patient or the patients

designated representative by a member of the treatment team, including a

discussion of the risks, benefits and commonly accepted alternatives to the

procedure, as well as the expected consequences of no therapy whatsoever.  

Discussion of the risks included, but was not limited to, those that are most

frequent and those that are rare but possibly severe or life-threatening, as

well as the possibility of unforeseen complications.



  All elements of maximal sterile barrier technique including the use of a

cap, mask, sterile gown, sterile gloves, large sterile sheet, appropriate hand

hygiene, and 2% chlorhexidine for cutaneous antisepsis (or acceptable

alternative antiseptic per current guidelines) were followed for this

procedure.



The patient was brought to the CT scanner and placed in the supine position. A

timeout procedure was performed. CT imaging was performed redemonstrating

previously seen pancreatic pseudocyst. The overlying anterior abdomen was

prepped and draped using sterile barrier technique as described above. 1%

lidocaine was administered for local anesthesia. Under intermittent CT

guidance a 22-gauge needle was advanced into the cyst. Cyst contents were

aspirated. Approximately 100 cc of fluid was removed. This fluid was sent for

further evaluation per ordering physician request. The needle was removed.

Manual pressure was held. Repeat CT demonstrates no significant hemorrhage or

other complication. Patient tolerated the procedure well.



The procedures performed under conscious sedation including continuous

cardiopulmonary monitoring via dedicated sedation nurse. 

Face-to-face sedation time: 26 minutes



Impression: CT-guided aspiration of probable pancreatic pseudocyst







PQRS Compliance Statement:



One or more of the following individualized dose reduction techniques were

utilized for this examination:  

1. Automated exposure control  

2. Adjustment of the mA and/or kV according to patient size  

3. Use of iterative reconstruction technique

## 2021-01-26 NOTE — PDOC
Date of Service:


DATE: 1/26/21 


TIME: 09:36





Subjective:


Subjective:


Doesn't feel good.


No abdominal pain today.  Eating and stooling.


Has pain in left flank - similar to past kidney stones.  Chronic back pain is 

mid-lower back.


Wants to have a diagnosis/plan before she goes home.  Says she'd be stupid not 

to take IV and PO meds here for pain because IV is available, but also says she 

could go home and take her usual PO pain meds just fine.  Also wants to know if 

she has a kidney stone before she leaves so she won't have to come back a third 

time and "butt heads with the ER doctor again."


Wants to know when test results will be available.  Also wants to know if I'll 

have answers the next time I come by instead of "just stopping by without having

anything to say."





Objective:


Objective:


D/w nurse - tolerating GI soft, stooling, c/o back pain and wondering about 

kidney stone, getting IV and PO pain meds.


Vital Signs:





                                   Vital Signs








  Date Time  Temp Pulse Resp B/P (MAP) Pulse Ox O2 Delivery O2 Flow Rate FiO2


 


1/26/21 08:13  78  145/84    


 


1/26/21 08:12      Room Air  


 


1/26/21 07:00 98.5  18  96   





 98.5       


 


1/25/21 22:15       2.0 








Labs:





Laboratory Tests








Test


 1/26/21


06:50


 


White Blood Count 5.1 x10^3/uL 


 


Red Blood Count 3.68 x10^6/uL 


 


Hemoglobin 12.8 g/dL 


 


Hematocrit 37.5 % 


 


Mean Corpuscular Volume 102 fL 


 


Mean Corpuscular Hemoglobin 35 pg 


 


Mean Corpuscular Hemoglobin


Concent 34 g/dL 





 


Red Cell Distribution Width 16.1 % 


 


Platelet Count 234 x10^3/uL 


 


Neutrophils (%) (Auto) 58 % 


 


Lymphocytes (%) (Auto) 29 % 


 


Monocytes (%) (Auto) 9 % 


 


Eosinophils (%) (Auto) 3 % 


 


Basophils (%) (Auto) 1 % 


 


Neutrophils # (Auto) 3.0 x10^3/uL 


 


Lymphocytes # (Auto) 1.5 x10^3/uL 


 


Monocytes # (Auto) 0.4 x10^3/uL 


 


Eosinophils # (Auto) 0.2 x10^3/uL 


 


Basophils # (Auto) 0.0 x10^3/uL 


 


Sodium Level 140 mmol/L 


 


Potassium Level 3.6 mmol/L 


 


Chloride Level 101 mmol/L 


 


Carbon Dioxide Level 30 mmol/L 


 


Anion Gap 9 


 


Blood Urea Nitrogen 10 mg/dL 


 


Creatinine 1.0 mg/dL 


 


Estimated GFR


(Cockcroft-Gault) 56.2 





 


Glucose Level 84 mg/dL 


 


Calcium Level 9.0 mg/dL 





ORDERED:  ANAER/AEROB/GS                                                        

           


COMMENTS: PANCREATIC PSEUDOCYST, FLUID                                


 

--------------------------------------------------------------------------------


------------





  Procedure                         Result                                      

         


-----------------------------------------------

---------------------------------------------





  GRAM STAIN  Final  


        Final





        NO ORGANISMS SEEN.


        SQUAMOUS EPI CELL:NOT APPLICABLE


        PMN (WBCs):NONE SEEN


        Unless otherwise specified, Testing Performed by:


        [place] [address]


        For Inquires, the Physician may contact the Microbiology


        department at [phone]





  ANAEROBIC-AEROBIC CULTURE  


                                PENDING





PE:





GEN: NAD


LUNGS: diminished


HEART: RRR


ABD: left flank tenderness


NEURO/PSYCH: A & O 3





A/P:


Pancreatic cyst w/ PD and CBD compression


Left flank pain, chronic LBP


UTI, hypothyroidism





--


Gram stain as above, culture and amylase pending.


Per CT report from 1/19: 7 mm nonobstructing calculus is seen involving the 

lower pole the right kidney.


Other per Dr. Andrade.





Justicifation of Admission Dx:


Justifications for Admission:


Justification of Admission Dx:  Yes











MELY MARIEE         Jan 26, 2021 09:45

## 2021-01-26 NOTE — DISCH
DISCHARGE INSTRUCTIONS


Condition on Discharge


Condition on Discharge:  Stable





Activity After Discharge


Activity Instructions for Disc:  Activity as tolerated, Progressive ambulation, 

Walk in house, Other ROM activity


Lifting Instructions after Dis:  No heavy lifting, No pulling or pushing, Do not

lift >10 pounds


Exercise Instruction after Dis:  Progress as tolerated


Driving Instructions after Dis:  Do not drive, Do not drive today


Weight Bearing Status after Di:  As tolerated





Diet after Discharge


Diet after Discharge:  Cardiac, Regular


Diet Texture:  Regular


Liquid Texture:  Thin Liquid


Swallowing Supervision:  None needed





Checks after Discharge


Checks after discharge:  Check blood press - daily, Check your Temp as needed





Contacting the DR. after DC


Call your doctor for:  If your condition worsens





Follow-Up


Follow up with:  GI ON ONE WEEK,  PCP IN 10-14 DAYS





Treatment/Equipment after DC


Adaptive Equipment Issued:  None


Comment:  midline above umbilicus











ALEJANDRA YIN MD          Jan 26, 2021 15:53

## 2021-01-26 NOTE — DISCH
DISCHARGE INSTRUCTIONS


Condition on Discharge


Condition on Discharge:  Stable





Activity After Discharge


Activity Instructions for Disc:  Resume previous activity, Activity as marly

ated, Progressive ambulation, Walk in house, Other ROM activity


Lifting Instructions after Dis:  No heavy lifting, No pulling or pushing, Do not

lift >10 pounds


Exercise Instruction after Dis:  Progress as tolerated


Driving Instructions after Dis:  Do not drive, Do not drive today


Weight Bearing Status after Di:  As tolerated





Diet after Discharge


Diet after Discharge:  Cardiac, Regular


Diet Texture:  Regular


Liquid Texture:  Thin Liquid


Swallowing Supervision:  None needed





Checks after Discharge


Checks after discharge:  Check blood press - daily, Check your Temp as needed





Contacting the DR. after DC


Call your doctor for:  If your condition worsens





Follow-Up


Follow up with:  GI ON ONE WEEK,  PCP IN 10-14 DAYS,  SURGERY IN 2 WEEKS





Treatment/Equipment after DC


Adaptive Equipment Issued:  None


Comment:  midline above umbilicus











ALEJANDRA YIN MD          Jan 26, 2021 15:55

## 2021-01-26 NOTE — PDOC3
Discharge Summary


Date of Admission:  Jan 20, 2021


Date of Discharge:  Jan 26, 2021


Follow-Up:  3-5 days


Admitting Diagnosis comment:





Chief Complaint


Chief Complaint


-=========hospital d/c summary =======================








DATE OF ADMIT  01-20-21





DATE OF D/C  01-26-21





CONSULTATIONS


GI, GENERAL SURGERY





COMPLICATIONS   NONE





D/C CONDITION GOOD





PROCEDURES  CT-guided aspiration of probable pancreatic pseudocyst 1-25    

Approximately 100 cc of fluid was removed. This fluid was sent for


further evaluation / AMYLASE 








TO SEE GI IN ONE WEEK  // , PCP IN ONE WEEK














discharge dx ===============================





Acute on chronic pancreatitis


Pancreatic pseudocyst


Dilated gallbladder vs cholecystitis


Transaminitis


UTI E COLI, IV ROCEPHIN  CHG PO AUGMENTIN ON D/C 





intractable back and abdominal pain








Plan:





SOFT DIET


GI following OK WITH D/C 1-26 


Follow urine output


Consult general surgery


No gallstones seen on ultrasound; will defer to general surgery for 

recommendations on HIDA scan


Dilaudid IV as needed for pain, STOPPED 1/26


Resume home medications


PPX  - Lovenox


FULL CODE


Dispo - inpatient for above


Fluid amylase still pending.  








d/w RN  PT DESIRES TO GO HOME TODAY








D/C PLANNING   33  MIN





History of Present Illness


History of Present Illness


Patient 60-year-old female presented  to the ER with complaint of left upper 

quadrant abdominal pain.  She was seen in the ED yesterday for similar symptoms,

but left AMA.  CT abdomen/pelvis at that time showed acute on chronic hepatitis 

with 7.8 cm pancreatic pseudocyst and distended gallbladder.  She returned to 

the ER  the behest of her PCP.  She reports abdominal pain, 10/10.








1/25 drainage of pseudocyst today  c/o persistent pain


D/W RN 





1-26 IMPROVED, HOME TODAY 








1/24: Afebrile.  Still complains of abdominal pain.  Plan for IR to biopsy and 

potentially drain pseudocyst on Monday.  Continue full liquid diet.





1/23: Discussed results MRCP with patient.  She has 7.5 cm pseudocyst is seen 

within the head of the pancreas, mass effect compressing the proximal main 

pancreatic duct and the common bile duct resulting in intra and extrahepatic 

biliary ductal dilatation and gallbladder distention and consulted 

interventional radiology to try to biopsy and aspirate pseudocyst.  This will 

take place Monday.  Patient reports 8/10 abdominal pain, requesting regular 

diet.  Discussed this would not be consistent with appropriate medical 

management pancreatitis.  Continue full liquid diet.





1/22: HIDA scan yesterday showing slow passage of radiotracer into the small 

bowel without evidence of a high-grade bile duct obstruction.  Continue 

treatment of pancreatitis with bowel rest and IV fluids.  Follow for resolution 

of pancreatic pseudocyst.  Will consult IR to see about sampling pancreatic 

pseudocyst.  She is scheduled for MRCP today.  Patient does admits to improperly

taking her levothyroxine with her other home medications.  Discussed taking 

levothyroxine as first medication in the morning at least 1 hour prior to any 

other food or medications.





Vitals


Vitals





Vital Signs








  Date Time  Temp Pulse Resp B/P (MAP) Pulse Ox O2 Delivery O2 Flow Rate FiO2


 


1/26/21 06:32     95 Room Air  


 


1/26/21 04:35   20     


 


1/26/21 03:00 98.2 80  132/86 (101)    





 98.2       


 


1/25/21 22:15       2.0 











Physical Exam


General:  Alert, Oriented X3, Cooperative, No acute distress, mild distress


Heart:  Regular rate, Normal S1, Normal S2


Lungs:  Clear


Abdomen:  Normal bowel sounds, Soft, Other (Tender to the epigastrium)


Extremities:  No clubbing, No cyanosis


Skin:  No rashes, No breakdown





Labs


LABS





1/26/2021 11:02 AM





Procedure: CT-guided aspiration probable pancreatic  pseudocyst





Clinical Indication:   PANCREATIC PSEUDOCYST





Discussion:


 


 The procedure was explained in its entirety to the patient or the patients


designated representative by a member of the treatment team, including a


discussion of the risks, benefits and commonly accepted alternatives to the


procedure, as well as the expected consequences of no therapy whatsoever.  


Discussion of the risks included, but was not limited to, those that are most


frequent and those that are rare but possibly severe or life-threatening, as


well as the possibility of unforeseen complications.





  All elements of maximal sterile barrier technique including the use of a


cap, mask, sterile gown, sterile gloves, large sterile sheet, appropriate hand


hygiene, and 2% chlorhexidine for cutaneous antisepsis (or acceptable


alternative antiseptic per current guidelines) were followed for this


procedure.





The patient was brought to the CT scanner and placed in the supine position. A


timeout procedure was performed. CT imaging was performed redemonstrating


previously seen pancreatic pseudocyst. The overlying anterior abdomen was


prepped and draped using sterile barrier technique as described above. 1%


lidocaine was administered for local anesthesia. Under intermittent CT


guidance a 22-gauge needle was advanced into the cyst. Cyst contents were


aspirated. Approximately 100 cc of fluid was removed. This fluid was sent for


further evaluation per ordering physician request. The needle was removed.


Manual pressure was held. Repeat CT demonstrates no significant hemorrhage or


other complication. Patient tolerated the procedure well.





The procedures performed under conscious sedation including continuous


cardiopulmonary monitoring via dedicated sedation nurse. 


Face-to-face sedation time: 26 minutes





Impression: CT-guided aspiration of probable pancreatic pseudocyst











PQRS Compliance Statement:





One or more of the following individualized dose reduction techniques were


utilized for this examination:  


1. Automated exposure control  


2. Adjustment of the mA and/or kV according to patient size  


3. Use of iterative reconstruction technique


FINAL DIAGNOSIS


Problems


Medical Problems:


(1) Acute on chronic pancreatitis


Status: Acute  





(2) Right upper quadrant abdominal pain with positive Hodge's Sign


Status: Acute  





(3) Transaminitis


Status: Acute  








Brief Hospital Course


Ms. Chand  is a 60 old [sex] who presented with [INTRACTABLE PAIN, PANCREATIC 

PSEUDOCYST ]


CONDITION AT DISCHARGE:  Improved


Discharge Medications





Current Medications


Sodium Chloride 1,000 ml @  1,000 mls/hr Q1H IV  Last administered on 1/20/21at 

20:15;  Start 1/20/21 at 20:00;  Stop 1/20/21 at 20:59;  Status DC


Sodium Chloride 1,000 ml @  250 mls/hr Q4H IV  Last administered on 1/20/21at 

21:00;  Start 1/20/21 at 20:00;  Stop 1/21/21 at 02:05;  Status DC


Hydromorphone HCl (Dilaudid) 1 mg 1X  ONCE IVP  Last administered on 1/20/21at 

20:18;  Start 1/20/21 at 20:00;  Stop 1/20/21 at 20:11;  Status DC


Hydromorphone HCl (Dilaudid) 2 mg STK-MED ONCE .ROUTE ;  Start 1/20/21 at 20:03;

 Stop 1/20/21 at 20:03;  Status DC


Hydromorphone HCl (Dilaudid) 1 mg 1X  ONCE IVP  Last administered on 1/20/21at 

21:21;  Start 1/20/21 at 21:15;  Stop 1/20/21 at 21:16;  Status DC


Ondansetron HCl (Zofran) 4 mg PRN Q8HRS  PRN IV NAUSEA/VOMITING;  Start 1/20/21 

at 22:00;  Stop 1/21/21 at 11:29;  Status DC


Sodium Chloride 1,000 ml @  100 mls/hr Q10H IV  Last administered on 1/21/21at 

19:42;  Start 1/20/21 at 22:00;  Stop 1/21/21 at 21:59;  Status DC


Acetaminophen (Tylenol) 650 mg PRN Q4HRS  PRN PO FEVER > 100.3'F;  Start 1/20/21

at 22:00;  Stop 1/21/21 at 11:29;  Status DC


Hydromorphone HCl (Dilaudid) 2 mg PRN Q4HRS  PRN IVP PAIN Last administered on 

1/20/21at 23:02;  Start 1/20/21 at 22:00;  Stop 1/20/21 at 23:32;  Status DC


Hydromorphone HCl (Dilaudid) 4 mg PRN Q2HR  PRN IVP MODERATE TO SEVERE PAIN Last

administered on 1/21/21at 19:40;  Start 1/20/21 at 23:30;  Stop 1/21/21 at 

20:45;  Status DC


Ondansetron HCl (Zofran) 4 mg PRN Q6HRS  PRN IVP NAUSEA/VOMITING;  Start 1/20/21

at 23:45


Prochlorperazine Edisylate (Compazine) 10 mg PRN Q6HRS  PRN IVP NAUSEA/VOMITING 

Last administered on 1/26/21at 12:04;  Start 1/20/21 at 23:45


Al Hydroxide/Mg Hydroxide (Mylanta Plus Xs) 30 ml PRN Q3HRS  PRN PO HEARTBURN / 

GAS;  Start 1/20/21 at 23:45


Calcium Carbonate/ Glycine (Tums) 500 mg PRN Q3HRS  PRN PO UPSET STOMACH;  Start

1/20/21 at 23:45


Acetaminophen (Tylenol) 650 mg PRN Q6HRS  PRN PO Headaches, Temp > 101.5F;  

Start 1/20/21 at 23:45


Magnesium Hydroxide (Milk Of Magnesia) 2,400 mg PRN Q12HR  PRN PO CONSTIPATION; 

Start 1/20/21 at 23:45


Bisacodyl (Dulcolax Supp) 10 mg PRN DAILY  PRN MS CONSTIPATION;  Start 1/20/21 

at 23:45


Enoxaparin Sodium (Lovenox 40mg Syringe) 40 mg Q24H SQ ;  Start 1/20/21 at 23:45


Amlodipine Besylate (Norvasc) 10 mg DAILY PO  Last administered on 1/26/21at 

08:13;  Start 1/21/21 at 09:00


Cyclobenzaprine HCl (Flexeril) 10 mg PRN TID  PRN PO MUSCLE PAIN Last 

administered on 1/26/21at 12:04;  Start 1/20/21 at 23:45


Hydrochlorothiazide (Hydrodiuril) 25 mg DAILY PO ;  Start 1/21/21 at 09:00;  

Stop 1/21/21 at 20:45;  Status DC


Levothyroxine Sodium (Synthroid) 150 mcg DAILY06 PO  Last administered on 

1/26/21at 06:31;  Start 1/21/21 at 06:00


Lidocaine (Lidoderm) 1 patch PRN DAILY  PRN TP TOPICAL PAIN;  Start 1/20/21 at 

23:45


Oxycodone HCl (Roxicodone) 30 mg PRN Q4HRS  PRN PO MODERATE TO SEVERE PAIN Last 

administered on 1/26/21at 14:02;  Start 1/20/21 at 23:45


Sumatriptan Succinate (Imitrex) 100 mg PRN Q24HRS  PRN PO HEADACHE Last 

administered on 1/24/21at 00:20;  Start 1/20/21 at 23:45


Losartan Potassium (Cozaar) 100 mg DAILY PO  Last administered on 1/26/21at 

08:12;  Start 1/21/21 at 09:00


Lorazepam (Ativan Inj) 1 mg PRN Q4HRS  PRN IVP ANXIETY / AGITATION Last 

administered on 1/21/21at 19:40;  Start 1/20/21 at 23:45;  Stop 1/21/21 at 

20:46;  Status DC


Miscellaneous (Lidoderm Patch Removal) 1 ea QHS  ;  Start 1/22/21 at 21:00


Pantoprazole Sodium (PROTONIX VIAL for IV PUSH) 40 mg DAILYAC IVP  Last 

administered on 1/25/21at 07:59;  Start 1/21/21 at 10:45;  Stop 1/25/21 at 

09:14;  Status DC


Morphine Sulfate (Morphine Sulfate) 4 mg 1X  ONCE IV  Last administered on 

1/21/21at 14:55;  Start 1/21/21 at 14:45;  Stop 1/21/21 at 14:46;  Status DC


Morphine Sulfate (Morphine Sulfate) 4 mg STK-MED ONCE .ROUTE ;  Start 1/21/21 at

14:42;  Stop 1/21/21 at 14:43;  Status DC


Nicotine (Nicoderm Cq 21mg) 1 patch DAILY TD  Last administered on 1/26/21at 

08:13;  Start 1/21/21 at 17:00


Hydromorphone HCl (Dilaudid) 2 mg PRN Q2HR  PRN IVP MODERATE TO SEVERE PAIN Last

administered on 1/26/21at 12:05;  Start 1/21/21 at 20:45


Hydralazine HCl (Apresoline Inj) 10 mg PRN Q4HRS  PRN IVP ELEVATED BP, SEE C

OMMENTS Last administered on 1/22/21at 03:26;  Start 1/22/21 at 03:15


Pantoprazole Sodium (Protonix) 40 mg DAILYAC PO  Last administered on 1/26/21at 

08:12;  Start 1/26/21 at 07:30


Ceftriaxone Sodium (Rocephin) 1 gm Q24H IVP  Last administered on 1/26/21at 

14:03;  Start 1/25/21 at 14:00


Lidocaine HCl (Buffered Lidocaine 1%) 3 ml STK-MED ONCE .ROUTE ;  Start 1/25/21 

at 10:45;  Stop 1/25/21 at 10:45;  Status DC


Midazolam HCl (Versed) 2 mg STK-MED ONCE .ROUTE ;  Start 1/25/21 at 10:47;  Stop

1/25/21 at 10:48;  Status DC


Fentanyl Citrate (Fentanyl 2ml Vial) 100 mcg STK-MED ONCE .ROUTE ;  Start 

1/25/21 at 10:48;  Stop 1/25/21 at 10:48;  Status DC


Lidocaine HCl (Buffered Lidocaine 1%) 3 ml 1X  ONCE IJ  Last administered on 

1/25/21at 11:34;  Start 1/25/21 at 11:00;  Stop 1/25/21 at 11:01;  Status DC


Fentanyl Citrate (Fentanyl 2ml Vial) 100 mcg 1X  ONCE IV  Last administered on 

1/25/21at 11:35;  Start 1/25/21 at 11:00;  Stop 1/25/21 at 11:01;  Status DC


Midazolam HCl (Versed) 2 mg 1X  ONCE IV  Last administered on 1/25/21at 11:34;  

Start 1/25/21 at 11:00;  Stop 1/25/21 at 11:01;  Status DC





Active Scripts


Active


Pyridium (Phenazopyridine Hcl) 100 Mg Tablet 100 Mg PO TID


Cipro (Ciprofloxacin Hcl) 250 Mg Tablet 1 Tab PO BID


Lidocaine PATCH  ** (Lidocaine) 1 Each Adh..patch 1 Each TP DAILY


     REMOVE AFTER 12 HOURS


Lidocaine PATCH  ** (Lidocaine) 1 Each Adh..patch 1 Each TP DAILY PRN


Diclofenac Sodium 50 Mg Tablet.dr 1 Tab PO BID


Cyclobenzaprine Hcl 10 Mg Tablet 1 Tab PO TID PRN


Bentyl (Dicyclomine Hcl) 10 Mg/1 Ml Ampul 10 Mg IM BID


Cyclobenzaprine Hcl 10 Mg Tablet 1 Tab PO TID PRN


Levaquin (Levofloxacin) 500 Mg Tablet 1 Tab PO DAILY


Macrobid 100 Mg Capsule (Nitrofurantoin Monohyd/M-Cryst) 100 Mg Capsule 1 Cap PO

BID


Cyclobenzaprine Hcl 10 Mg Tablet 1 Tab PO TID 30 Days


Levothyroxine Sodium 150 Mcg Tablet 150 Mcg PO DAILY06 30 Days


Imitrex (Sumatriptan Succinate) 100 Mg Tablet 1 Tab PO UD


Losartan Potassium 100 Mg Tablet 100 Mg PO DAILY 30 Days


Hydrochlorothiazide Tablet  ** (Hydrochlorothiazide) 25 Mg Tablet 1 Tab PO DAILY


Amlodipine Besylate 10 Mg Tablet 10 Mg PO DAILY 30 Days


Reported


Oxycodone Hcl Immed.release ** (Oxycodone Hcl) 5 Mg Tablet 30 Mg PO PRN Q4HRS 

PRN


Vital Signs





Vital Signs








  Date Time  Temp Pulse Resp B/P (MAP) Pulse Ox O2 Delivery O2 Flow Rate FiO2


 


1/26/21 15:30      Room Air  


 


1/26/21 15:00 98.3 88 18 106/65 (79) 96   





 98.3       


 


1/25/21 22:15       2.0 








Labs





Laboratory Tests








Test


 1/25/21


07:10 1/25/21


09:22 1/25/21


11:09 1/26/21


06:50


 


White Blood Count


 6.6 x10^3/uL


(4.0-11.0) 


 


 5.1 x10^3/uL


(4.0-11.0)


 


Red Blood Count


 3.72 x10^6/uL


(3.50-5.40) 


 


 3.68 x10^6/uL


(3.50-5.40)


 


Hemoglobin


 12.7 g/dL


(12.0-15.5) 


 


 12.8 g/dL


(12.0-15.5)


 


Hematocrit


 37.9 %


(36.0-47.0) 


 


 37.5 %


(36.0-47.0)


 


Mean Corpuscular Volume


 102 fL


() 


 


 102 fL


()


 


Mean Corpuscular Hemoglobin 34 pg (25-35)    35 pg (25-35) 


 


Mean Corpuscular Hemoglobin


Concent 34 g/dL


(31-37) 


 


 34 g/dL


(31-37)


 


Red Cell Distribution Width


 15.5 %


(11.5-14.5) 


 


 16.1 %


(11.5-14.5)


 


Platelet Count


 243 x10^3/uL


(140-400) 


 


 234 x10^3/uL


(140-400)


 


Neutrophils (%) (Auto) 70 % (31-73)    58 % (31-73) 


 


Lymphocytes (%) (Auto) 19 % (24-48)    29 % (24-48) 


 


Monocytes (%) (Auto) 9 % (0-9)    9 % (0-9) 


 


Eosinophils (%) (Auto) 3 % (0-3)    3 % (0-3) 


 


Basophils (%) (Auto) 0 % (0-3)    1 % (0-3) 


 


Neutrophils # (Auto)


 4.6 x10^3/uL


(1.8-7.7) 


 


 3.0 x10^3/uL


(1.8-7.7)


 


Lymphocytes # (Auto)


 1.2 x10^3/uL


(1.0-4.8) 


 


 1.5 x10^3/uL


(1.0-4.8)


 


Monocytes # (Auto)


 0.6 x10^3/uL


(0.0-1.1) 


 


 0.4 x10^3/uL


(0.0-1.1)


 


Eosinophils # (Auto)


 0.2 x10^3/uL


(0.0-0.7) 


 


 0.2 x10^3/uL


(0.0-0.7)


 


Basophils # (Auto)


 0.0 x10^3/uL


(0.0-0.2) 


 


 0.0 x10^3/uL


(0.0-0.2)


 


Prothrombin Time


 12.4 SEC


(11.7-14.0) 


 


 





 


Prothromb Time International


Ratio 1.0 (0.8-1.1) 


 


 


 





 


Activated Partial


Thromboplast Time 32 SEC (24-38) 


 


 


 





 


Sodium Level


 140 mmol/L


(136-145) 


 


 140 mmol/L


(136-145)


 


Potassium Level


 3.5 mmol/L


(3.5-5.1) 


 


 3.6 mmol/L


(3.5-5.1)


 


Chloride Level


 102 mmol/L


() 


 


 101 mmol/L


()


 


Carbon Dioxide Level


 30 mmol/L


(21-32) 


 


 30 mmol/L


(21-32)


 


Anion Gap 8 (6-14)    9 (6-14) 


 


Blood Urea Nitrogen


 5 mg/dL (7-20) 


 


 


 10 mg/dL


(7-20)


 


Creatinine


 1.0 mg/dL


(0.6-1.0) 


 


 1.0 mg/dL


(0.6-1.0)


 


Estimated GFR


(Cockcroft-Gault) 56.2 


 


 


 56.2 





 


Glucose Level


 101 mg/dL


(70-99) 


 


 84 mg/dL


(70-99)


 


Calcium Level


 9.5 mg/dL


(8.5-10.1) 


 


 9.0 mg/dL


(8.5-10.1)


 


SARS-CoV-2 Antigen (Rapid)


 


 Negative


(NEGATIVE) 


 





 


Body Fluid Amylase   3289 U/L (.)  








Laboratory Tests








Test


 1/26/21


06:50


 


White Blood Count


 5.1 x10^3/uL


(4.0-11.0)


 


Red Blood Count


 3.68 x10^6/uL


(3.50-5.40)


 


Hemoglobin


 12.8 g/dL


(12.0-15.5)


 


Hematocrit


 37.5 %


(36.0-47.0)


 


Mean Corpuscular Volume


 102 fL


()


 


Mean Corpuscular Hemoglobin 35 pg (25-35) 


 


Mean Corpuscular Hemoglobin


Concent 34 g/dL


(31-37)


 


Red Cell Distribution Width


 16.1 %


(11.5-14.5)


 


Platelet Count


 234 x10^3/uL


(140-400)


 


Neutrophils (%) (Auto) 58 % (31-73) 


 


Lymphocytes (%) (Auto) 29 % (24-48) 


 


Monocytes (%) (Auto) 9 % (0-9) 


 


Eosinophils (%) (Auto) 3 % (0-3) 


 


Basophils (%) (Auto) 1 % (0-3) 


 


Neutrophils # (Auto)


 3.0 x10^3/uL


(1.8-7.7)


 


Lymphocytes # (Auto)


 1.5 x10^3/uL


(1.0-4.8)


 


Monocytes # (Auto)


 0.4 x10^3/uL


(0.0-1.1)


 


Eosinophils # (Auto)


 0.2 x10^3/uL


(0.0-0.7)


 


Basophils # (Auto)


 0.0 x10^3/uL


(0.0-0.2)


 


Sodium Level


 140 mmol/L


(136-145)


 


Potassium Level


 3.6 mmol/L


(3.5-5.1)


 


Chloride Level


 101 mmol/L


()


 


Carbon Dioxide Level


 30 mmol/L


(21-32)


 


Anion Gap 9 (6-14) 


 


Blood Urea Nitrogen


 10 mg/dL


(7-20)


 


Creatinine


 1.0 mg/dL


(0.6-1.0)


 


Estimated GFR


(Cockcroft-Gault) 56.2 





 


Glucose Level


 84 mg/dL


(70-99)


 


Calcium Level


 9.0 mg/dL


(8.5-10.1)








Allergies





                                    Allergies








Coded Allergies Type Severity Reaction Last Updated Verified


 


  No Known Drug Allergies    10/8/16 No








Disposition/Orders:  D/C to Home





Justicifation of Admission Dx:


Justifications for Admission:


Justification of Admission Dx:  Yes











ALEJANDRA YIN MD          Jan 26, 2021 15:48

## 2021-01-31 ENCOUNTER — HOSPITAL ENCOUNTER (INPATIENT)
Dept: HOSPITAL 61 - ER | Age: 61
LOS: 1 days | Discharge: HOME | DRG: 438 | End: 2021-02-01
Attending: INTERNAL MEDICINE | Admitting: INTERNAL MEDICINE
Payer: MEDICAID

## 2021-01-31 VITALS — HEIGHT: 68 IN | WEIGHT: 185.19 LBS | BODY MASS INDEX: 28.07 KG/M2

## 2021-01-31 VITALS — DIASTOLIC BLOOD PRESSURE: 78 MMHG | SYSTOLIC BLOOD PRESSURE: 128 MMHG

## 2021-01-31 DIAGNOSIS — R74.8: ICD-10-CM

## 2021-01-31 DIAGNOSIS — J84.9: ICD-10-CM

## 2021-01-31 DIAGNOSIS — Z79.891: ICD-10-CM

## 2021-01-31 DIAGNOSIS — G89.29: ICD-10-CM

## 2021-01-31 DIAGNOSIS — Z85.41: ICD-10-CM

## 2021-01-31 DIAGNOSIS — Z20.822: ICD-10-CM

## 2021-01-31 DIAGNOSIS — Z92.3: ICD-10-CM

## 2021-01-31 DIAGNOSIS — I10: ICD-10-CM

## 2021-01-31 DIAGNOSIS — Z98.51: ICD-10-CM

## 2021-01-31 DIAGNOSIS — N17.0: ICD-10-CM

## 2021-01-31 DIAGNOSIS — E87.6: ICD-10-CM

## 2021-01-31 DIAGNOSIS — E66.9: ICD-10-CM

## 2021-01-31 DIAGNOSIS — K86.3: Primary | ICD-10-CM

## 2021-01-31 DIAGNOSIS — J44.9: ICD-10-CM

## 2021-01-31 DIAGNOSIS — E03.9: ICD-10-CM

## 2021-01-31 DIAGNOSIS — K86.2: ICD-10-CM

## 2021-01-31 DIAGNOSIS — D75.89: ICD-10-CM

## 2021-01-31 DIAGNOSIS — F17.200: ICD-10-CM

## 2021-01-31 DIAGNOSIS — Z92.21: ICD-10-CM

## 2021-01-31 DIAGNOSIS — D53.9: ICD-10-CM

## 2021-01-31 LAB
ALBUMIN SERPL-MCNC: 3.6 G/DL (ref 3.4–5)
ALBUMIN/GLOB SERPL: 0.8 {RATIO} (ref 1–1.7)
ALP SERPL-CCNC: 164 U/L (ref 46–116)
ALT SERPL-CCNC: 62 U/L (ref 14–59)
AMPHETAMINE/METHAMPHETAMINE: (no result)
ANION GAP SERPL CALC-SCNC: 11 MMOL/L (ref 6–14)
AST SERPL-CCNC: 29 U/L (ref 15–37)
BARBITURATES UR-MCNC: (no result) UG/ML
BASE EXCESS ABG: 0 MMOL/L (ref -3–3)
BASOPHILS # BLD AUTO: 0.1 X10^3/UL (ref 0–0.2)
BASOPHILS NFR BLD: 1 % (ref 0–3)
BENZODIAZ UR-MCNC: (no result) UG/L
BILIRUB SERPL-MCNC: 0.5 MG/DL (ref 0.2–1)
BUN SERPL-MCNC: 6 MG/DL (ref 7–20)
BUN/CREAT SERPL: 5 (ref 6–20)
CALCIUM SERPL-MCNC: 8.8 MG/DL (ref 8.5–10.1)
CANNABINOIDS UR-MCNC: (no result) UG/L
CHLORIDE SERPL-SCNC: 98 MMOL/L (ref 98–107)
CO2 SERPL-SCNC: 28 MMOL/L (ref 21–32)
COCAINE UR-MCNC: (no result) NG/ML
CREAT SERPL-MCNC: 1.1 MG/DL (ref 0.6–1)
EOSINOPHIL NFR BLD: 0.1 X10^3/UL (ref 0–0.7)
EOSINOPHIL NFR BLD: 1 % (ref 0–3)
ERYTHROCYTE [DISTWIDTH] IN BLOOD BY AUTOMATED COUNT: 15.3 % (ref 11.5–14.5)
GFR SERPLBLD BASED ON 1.73 SQ M-ARVRAT: 50.3 ML/MIN
GLUCOSE SERPL-MCNC: 104 MG/DL (ref 70–99)
HCO3 BLDA-SCNC: 24 MMOL/L (ref 21–28)
HCT VFR BLD CALC: 34.9 % (ref 36–47)
HGB BLD-MCNC: 12.1 G/DL (ref 12–15.5)
INFLUENZA A PATIENT: NEGATIVE
INFLUENZA B PATIENT: NEGATIVE
INSPIRATION SETTING TIME VENT: (no result)
LIPASE: 42 U/L (ref 73–393)
LYMPHOCYTES # BLD: 1.5 X10^3/UL (ref 1–4.8)
LYMPHOCYTES NFR BLD AUTO: 15 % (ref 24–48)
MAGNESIUM SERPL-MCNC: 1.8 MG/DL (ref 1.8–2.4)
MCH RBC QN AUTO: 35 PG (ref 25–35)
MCHC RBC AUTO-ENTMCNC: 35 G/DL (ref 31–37)
MCV RBC AUTO: 101 FL (ref 79–100)
METHADONE SERPL-MCNC: (no result) NG/ML
MONO #: 0.9 X10^3/UL (ref 0–1.1)
MONOCYTES NFR BLD: 9 % (ref 0–9)
NEUT #: 7.6 X10^3/UL (ref 1.8–7.7)
NEUTROPHILS NFR BLD AUTO: 75 % (ref 31–73)
OPIATES UR-MCNC: (no result) NG/ML
PCO2 BLDA: 37 MMHG (ref 35–46)
PCP SERPL-MCNC: (no result) MG/DL
PLATELET # BLD AUTO: 285 X10^3/UL (ref 140–400)
PO2 BLDA: 73 MMHG (ref 65–108)
POTASSIUM SERPL-SCNC: 2.7 MMOL/L (ref 3.5–5.1)
PROT SERPL-MCNC: 8.3 G/DL (ref 6.4–8.2)
RBC # BLD AUTO: 3.45 X10^6/UL (ref 3.5–5.4)
SAO2 % BLDA: 95 % (ref 92–99)
SODIUM SERPL-SCNC: 137 MMOL/L (ref 136–145)
WBC # BLD AUTO: 10.2 X10^3/UL (ref 4–11)

## 2021-01-31 PROCEDURE — 80053 COMPREHEN METABOLIC PANEL: CPT

## 2021-01-31 PROCEDURE — 99285 EMERGENCY DEPT VISIT HI MDM: CPT

## 2021-01-31 PROCEDURE — 74176 CT ABD & PELVIS W/O CONTRAST: CPT

## 2021-01-31 PROCEDURE — 36600 WITHDRAWAL OF ARTERIAL BLOOD: CPT

## 2021-01-31 PROCEDURE — 80307 DRUG TEST PRSMV CHEM ANLYZR: CPT

## 2021-01-31 PROCEDURE — 83605 ASSAY OF LACTIC ACID: CPT

## 2021-01-31 PROCEDURE — 96375 TX/PRO/DX INJ NEW DRUG ADDON: CPT

## 2021-01-31 PROCEDURE — 96376 TX/PRO/DX INJ SAME DRUG ADON: CPT

## 2021-01-31 PROCEDURE — 96365 THER/PROPH/DIAG IV INF INIT: CPT

## 2021-01-31 PROCEDURE — 80048 BASIC METABOLIC PNL TOTAL CA: CPT

## 2021-01-31 PROCEDURE — G0378 HOSPITAL OBSERVATION PER HR: HCPCS

## 2021-01-31 PROCEDURE — 93005 ELECTROCARDIOGRAM TRACING: CPT

## 2021-01-31 PROCEDURE — 96367 TX/PROPH/DG ADDL SEQ IV INF: CPT

## 2021-01-31 PROCEDURE — 84100 ASSAY OF PHOSPHORUS: CPT

## 2021-01-31 PROCEDURE — 83690 ASSAY OF LIPASE: CPT

## 2021-01-31 PROCEDURE — 87040 BLOOD CULTURE FOR BACTERIA: CPT

## 2021-01-31 PROCEDURE — 83735 ASSAY OF MAGNESIUM: CPT

## 2021-01-31 PROCEDURE — U0003 INFECTIOUS AGENT DETECTION BY NUCLEIC ACID (DNA OR RNA); SEVERE ACUTE RESPIRATORY SYNDROME CORONAVIRUS 2 (SARS-COV-2) (CORONAVIRUS DISEASE [COVID-19]), AMPLIFIED PROBE TECHNIQUE, MAKING USE OF HIGH THROUGHPUT TECHNOLOGIES AS DESCRIBED BY CMS-2020-01-R: HCPCS

## 2021-01-31 PROCEDURE — 87804 INFLUENZA ASSAY W/OPTIC: CPT

## 2021-01-31 PROCEDURE — 71045 X-RAY EXAM CHEST 1 VIEW: CPT

## 2021-01-31 PROCEDURE — 82805 BLOOD GASES W/O2 SATURATION: CPT

## 2021-01-31 PROCEDURE — 84484 ASSAY OF TROPONIN QUANT: CPT

## 2021-01-31 PROCEDURE — 85025 COMPLETE CBC W/AUTO DIFF WBC: CPT

## 2021-01-31 PROCEDURE — 83880 ASSAY OF NATRIURETIC PEPTIDE: CPT

## 2021-01-31 PROCEDURE — 36415 COLL VENOUS BLD VENIPUNCTURE: CPT

## 2021-01-31 RX ADMIN — ENOXAPARIN SODIUM SCH MG: 40 INJECTION SUBCUTANEOUS at 22:00

## 2021-01-31 RX ADMIN — ENOXAPARIN SODIUM SCH MG: 40 INJECTION SUBCUTANEOUS at 21:37

## 2021-01-31 RX ADMIN — BACITRACIN SCH MLS/HR: 5000 INJECTION, POWDER, FOR SOLUTION INTRAMUSCULAR at 21:37

## 2021-01-31 NOTE — RAD
EXAM: CT Abdomen and Pelvis without IV contrast



INDICATION: Reason: left paraspinal lumbar back pain / Spl. Instructions:  / History: 



TECHNIQUE: Multi-detector row CT images were acquired from the lung bases through the abdomen and pel
vis without the use of IV contrast. Sagittal and coronal images were acquired from the transaxial natalio
a. All CT scans performed at this facility utilize dose optimization techniques as appropriate to the
 exam, including the following: Automated exposure control and adjustment of the mA and/or KV accordi
ng to patient size (this includes techniques or standardized protocols for targeted exams where dose 
is indication/reason for exam).



ORAL CONTRAST: None



COMPARISON: Abdominal MRI of 1/22/2021, CT guided FNA biopsy of the abdomen of 1/25/2021



FINDINGS: 



The absence of IV contrast limits evaluation of soft tissue pathology.



LOWER CHEST: Unremarkable



LIVER:  Right hepatic lobe hypodensity compatibe with geographic fatty infiltration.



BILIARY SYSTEM: Gallbladder is distended up to 5 cm transverse diameter. Bile ducts are not dilated.



PANCREAS:  Cystic pancreatic head mass measuring 5.6 cm is redemonstrated with wall thickening. This 
likely represents a reaccumulation of fluid in the pancreatic head following recent FNA biopsy.



SPLEEN:  Scattered splenic calcifications. No splenomegaly.



ADRENALS:  Unremarkable



KIDNEYS & URETERS:  Bilateral perinephric soft tissue stranding. Mild right hydronephrosis and hydrou
reter. No radiopaque stones in the ureters identified. 7 mm inferior pole nonobstructing right renal 
stone is present.



BLADDER:  Unremarkable



REPRODUCTIVE ORGANS:  Unremarkable



GASTROINTESTINAL: The stomach, small bowel, and colon are unremarkable. The appendix is normal.



MESENTERY/PERITONEUM/RETROPERITONEUM: Surgical changes from ventral hernia mesh repair.



VASCULAR:  Scattered arterial calcifications. Mild ectasia of the infrarenal abdominal aorta to 2.4 c
m without aneurysm.



LYMPH NODES:  No adenopathy



OSSEOUS & SOFT TISSUES:  L1 vertebroplasty. L5-S1 degenerative change. Mild rightward convexity scoli
osis of the lumbar spine with facet degenerative changes most conspicuous on the left at L3-L4, L4-L5
 and L5-S1. No acute fracture or aggressive appearing osseous lesions. Generalized osteopenia. Surgic
al clips along the right pelvic sidewall, right iliac chelly chain and right aspect of the IVC are pre
sent.



IMPRESSION:



1. No specific cause for left paraspinal lumbar pain identified on noncontrast CT. In particular, the
re is bilateral perinephric soft tissue stranding that could reflect renal inflammation from pyelonep
hritis but there are no specific findings to suggest that diagnosis and there are no stones or findin
gs of obstructive uropathy.



2. Patient does have multilevel lumbar spinal degenerative spondylosis with mild rightward convexity 
scoliosis. No acute fracture or aggressive appearing bony lesions.



3. There has been reaccumulation of fluid in the cystic pancreatic head mass that was recently aspira
hollie. This could reflect reticulation of fluid in the pancreatic pseudocyst. There are wall thickening
 and surrounding soft tissue stranding could reflect inflammation. Correlate for any clinical evidenc
e of acute pancreatitis. Lack of IV contrast limits more detailed evaluation.



4. Selective hypoattenuation of the right hepatic lobe could reflect geographic fatty infiltration bu
t alternatively could reflect variant perfusion. Correlate with liver function tests and if clinicall
y warranted, further imaging could be pursued with contrast enhanced CT or MRI of the abdomen, or dup
emma ultrasound of the liver.



Electronically signed by: Nathaniel Bustillo MD (1/31/2021 4:56 PM) [facility]

## 2021-01-31 NOTE — RAD
EXAM: CT Lumbar Spine without IV contrast



INDICATION: Reason: left paraspinal lumbar back pain / Spl. Instructions:  / History: 



TECHNIQUE:  Multi-detector row CT images were obtained through the lumbar spine without the use of IV
 contrast. Post-processing sagittal and coronal reconstructed images were obtained for interpretation
. All CT scans performed at this facility utilize dose optimization techniques as appropriate to the 
exam, including the following: Automated exposure control and adjustment of the mA and/or KV accordin
g to patient size (this includes techniques or standardized protocols for targeted exams where dose i
s indication/reason for exam).



COMPARISON: Abdomen and pelvis CT of the same day.



FINDINGS: 



The lowest fully formed disc is referred to as the L5-S1 level.



ALIGNMENT: There is subtle kyphotic angulation to the thoracal lumbar junction without anterolisthesi
s or retrolisthesis and mild rightward convexity scoliotic curvature apex at L2-L3 is present. There 
is subtle rightward lateral listhesis of L4 on L5 also noted.



OSSEOUS:  No evidence of acute fracture or bone destruction. Deformity to the superior endplate of L1
 is consistent with a chronic anterior wedge compression fracture, treated with vertebroplasty. Subch
ondral sclerosis at the superior endplate of S1 and at the inferior endplate of T12 is present.



DISC SPACES:  Marked disc space narrowing at L5-S1 is present but there is additional disc space narr
owing at multiple levels in the lumbar spine.



FACET JOINTS:  Facet hypertrophic change is present most conspicuously at L4-L5 and L5-S1 bilaterally
.



SPINAL CANAL:  No bony central canal stenosis is apparent. Combination of diffuse disc bulge and mild
 bilateral facet hypertrophy. Ligamentum flavum thickening likely results in at least mild central ca
nal narrowing at L2-L3, L3-L4 and L4-L5.



NEUROFORAMINA:  At least mild bilateral foraminal narrowing is present at multiple levels, most consp
icuously at L5-S1.



SOFT TISSUES:  Abdominal aortic calcifications. No acute findings in the paraspinal musculature to vi
sualize retroperitoneum in the included field of view. Surgical clips along the right pelvic sidewall
 extending up the inferior IVC on the right are incidentally noted.



IMPRESSION:



Lumbar spinal degenerative changes with no acute findings on noncontrast L-spine CT to explain left-s
ided paraspinal lumbar back pain.



Electronically signed by: Nathaniel Bustillo MD (1/31/2021 5:06 PM) [facility]

## 2021-01-31 NOTE — RAD
EXAM: Chest, single view.



HISTORY: Chest pain.



COMPARISON: 1/19/2021.



FINDINGS: A frontal view of the chest is obtained. There is diffuse interstitial infiltrate. There is
 nodular opacity within the left upper lobe likely due to nodular infiltrate given the absence of a n
odule in this region on the recent comparison study. There is no pleural effusion or pneumothorax. Th
e heart is stable in size.



IMPRESSION: Diffuse interstitial infiltrate with superimposed nodular infiltrate within the left uppe
r lobe. Follow-up to confirm resolution.



Electronically signed by: Eufemia Durham MD (1/31/2021 3:25 PM) [facility]

## 2021-01-31 NOTE — PDOC1
History and Physical


Date of Service:


DOS:


DATE: 1/31/21 


TIME: 20:45





Chief Complaint:


Chief Complain:


Epigastric abdominal pain





History of Present Illness:


HPI:


Patient is a 60-year-old female with past medical history of acute pancreatitis 

with pseudocyst that was recently drained by GI last week with 100 cc fluid 

output.  Who comes to the ED due to abdominal pain 8 out of 10.  Patient states 

that the pain occurs in the lower back which she does have chronic pain for and 

also in the epigastric and left upper quadrant region.  Has been occurring for 

the past week.  Patient does also take oxycodone chronically for her back pain. 

Denies any fevers, shortness of breath, diarrhea, pale stools, dark urine, or 

bloody stools.  Patient in the ED was found to have oxygen level 88% and 2 L 

oxygen was placed and she improved with 94%.  Denies any sick Covid contacts.





Past Medical/Surgical History:


PMH/PSH:


Past Medical History:  Cancer, Hypertension, Kidney Stone, chronic back pain, 

CERVICAL CANCER STATUS post chemoradiation brachytherapy only, no surgery


Past Surgical History:   Tubal ligation,  Ureteral Stent, Laparoscopy,





Allergies:


Allergies:  


Coded Allergies:  


     No Known Drug Allergies (Unverified , 10/8/16)





Family History:


Family History:


Reviewed with no relevant findings





Social History:


Social History:


Smoking Status:  Current Every Day Smoker


Alcohol Use:  None


Drug Use:  None





Current Medications:


Current Medications





Current Medications


Hydromorphone HCl (Dilaudid) 1 mg 1X  ONCE IVP  Last administered on 1/31/21at 

15:12;  Start 1/31/21 at 14:45;  Stop 1/31/21 at 14:46;  Status DC


Sodium Chloride 1,000 ml @  1,000 mls/hr 1X  ONCE IV  Last administered on 

1/31/21at 15:11;  Start 1/31/21 at 14:45;  Stop 1/31/21 at 15:44;  Status DC


Potassium Bicarbonate (Potassium Effervescent Tablet) 40 meq 1X  ONCE PO  Last 

administered on 1/31/21at 16:56;  Start 1/31/21 at 16:00;  Stop 1/31/21 at 16:

01;  Status DC


Magnesium Sulfate 50 ml @ 25 mls/hr 1X  ONCE IV  Last administered on 1/31/21at 

16:57;  Start 1/31/21 at 16:00;  Stop 1/31/21 at 17:59;  Status DC


Ceftriaxone Sodium (Rocephin) 1 gm 1X  ONCE IVP  Last administered on 1/31/21at 

18:42;  Start 1/31/21 at 17:00;  Stop 1/31/21 at 17:01;  Status DC


Azithromycin 250 ml @  250 mls/hr 1X  ONCE IV  Last administered on 1/31/21at 

18:43;  Start 1/31/21 at 17:00;  Stop 1/31/21 at 17:59;  Status DC


Hydromorphone HCl (Dilaudid) 1 mg 1X  ONCE IVP  Last administered on 1/31/21at 

18:42;  Start 1/31/21 at 18:45;  Stop 1/31/21 at 18:46;  Status DC


Ondansetron HCl (Zofran) 4 mg PRN Q8HRS  PRN IV NAUSEA/VOMITING;  Start 1/31/21 

at 18:45;  Stop 2/1/21 at 18:44


Acetaminophen (Tylenol) 650 mg PRN Q4HRS  PRN PO FEVER > 100.3'F;  Start 1/31/21

at 18:45;  Stop 2/1/21 at 18:44


Hydromorphone HCl (Dilaudid) 1 mg 1X  ONCE IVP  Last administered on 1/31/21at 

20:21;  Start 1/31/21 at 19:45;  Stop 1/31/21 at 19:46;  Status DC





Active Scripts


Active


Augmentin 500-125 Tablet (Amoxicillin/Potassium Clav) 1 Each Tablet 1 Tab PO BID

10 Days


Pantoprazole Sodium  ** (Pantoprazole Sodium) 40 Mg Tablet.dr 40 Mg PO DAILYAC 

30 Days


Mag-Al Plus Xs Suspension (Mag Hydrox/Al Hydrox/Simeth) 30 Ml Oral.susp 30 Ml PO

PRN Q3HRS PRN 14 Days


Tylenol (Acetaminophen) 325 Mg Tablet 650 Mg PO PRN Q6HRS PRN 14 Days


Pyridium (Phenazopyridine Hcl) 100 Mg Tablet 100 Mg PO TID


Lidocaine PATCH  ** (Lidocaine) 1 Each Adh..patch 1 Each TP DAILY


     REMOVE AFTER 12 HOURS


Lidocaine PATCH  ** (Lidocaine) 1 Each Adh..patch 1 Each TP DAILY PRN


Cyclobenzaprine Hcl 10 Mg Tablet 1 Tab PO TID PRN


Levothyroxine Sodium 150 Mcg Tablet 150 Mcg PO DAILY06 30 Days


Imitrex (Sumatriptan Succinate) 100 Mg Tablet 1 Tab PO UD


Losartan Potassium 100 Mg Tablet 100 Mg PO DAILY 30 Days


Amlodipine Besylate 10 Mg Tablet 10 Mg PO DAILY 30 Days


Reported


Oxycodone Hcl Immed.release ** (Oxycodone Hcl) 5 Mg Tablet 30 Mg PO PRN Q4HRS 

PRN





ROS:


Review of Systems


Review of System


REVIEW OF SYSTEMS:


GENERAL:  Denies weakness


SKIN:  No bruising, hair changes or rashes.


EYES:  No blurred, double or loss of vision.


NOSE AND THROAT:  No history of nosebleeds, hoarseness or sore throat.


HEART:  No history of palpitations, chest pain or shortness of breath on


exertion.


LUNGS:  Denies cough, hemoptysis, wheezing or shortness of breath.


GASTROINTESTINAL:  Denies changes in appetite, nausea, vomiting, diarrhea or


constipation.


GENITOURINARY:  No history of frequency, urgency, hesitancy or nocturia.


NEUROLOGIC:  Denies history of numbness, tingling, or tremor.


PSYCHIATRIC:  No history of panic, anxiety or depression.


ENDOCRINE:  No history of heat or cold intolerance, polyuria or polydipsia.


EXTREMITIES:  Denies joint pain, pain on walking or stiffness.





Physical Exam:


Vital Signs:





Vital Signs








  Date Time  Temp Pulse Resp B/P (MAP) Pulse Ox O2 Delivery O2 Flow Rate FiO2


 


1/31/21 18:42   15  92 Nasal Cannula 2.0 


 


1/31/21 16:46 98.9 84  154/81 (105)    





 98.9       








Physcial Exam:


GEN:   No apparent distress.  Alert and oriented


HEENT:   Normal cephalic, atraumatic, external auditory canals are patent


EYES:   Extraocular muscles are intact, pupil are equally round and reactive to 

light and accommodation


MUSCULOSKELETAL:  Well developed , well nourished, good range of motion


ENDOCRINE:   No thyromegaly was palpated


LYMPHATICS:   No cervical chain or axillary nodes were noted


HEMATOPOIETIC:  No bruising


NECK:   Supple, no JVD, no thyromegaly was noted


LUNGS:   Clear to auscultation in all lung fields without rhonchi or wheezing


HEART:    RRR, S!, S2 present.  Peripheral pulses intact, no obvious murmurs 

noted


ABDOMEN:   Soft, epigastric tenderness.  Positive bowel sounds, no organomegaly,

normal bowel sounds


EXTREMITIES:   Without clubbing, cyanosis, or edema.  Pedal pulses intact.  

Negative Homans sign


NEUROLOGIC:   Normal speech and tone.  A&O x 3, moves all extremities, no 

obvious focal deficits


PSYCHIATRIC:   Normal affect, normal mood. Stable


SKIN:   No ulcerations or rashes, good skin turgor, no jaundice


VASCULAR:   Good capillary refill, neurovascular bundle appears to be intact





Labs:


Labs:





Laboratory Tests








Test


 1/31/21


15:00 1/31/21


15:50 1/31/21


16:49 1/31/21


17:01


 


White Blood Count


 10.2 x10^3/uL


(4.0-11.0) 


 


 





 


Red Blood Count


 3.45 x10^6/uL


(3.50-5.40) 


 


 





 


Hemoglobin


 12.1 g/dL


(12.0-15.5) 


 


 





 


Hematocrit


 34.9 %


(36.0-47.0) 


 


 





 


Mean Corpuscular Volume


 101 fL


() 


 


 





 


Mean Corpuscular Hemoglobin 35 pg (25-35)    


 


Mean Corpuscular Hemoglobin


Concent 35 g/dL


(31-37) 


 


 





 


Red Cell Distribution Width


 15.3 %


(11.5-14.5) 


 


 





 


Platelet Count


 285 x10^3/uL


(140-400) 


 


 





 


Neutrophils (%) (Auto) 75 % (31-73)    


 


Lymphocytes (%) (Auto) 15 % (24-48)    


 


Monocytes (%) (Auto) 9 % (0-9)    


 


Eosinophils (%) (Auto) 1 % (0-3)    


 


Basophils (%) (Auto) 1 % (0-3)    


 


Neutrophils # (Auto)


 7.6 x10^3/uL


(1.8-7.7) 


 


 





 


Lymphocytes # (Auto)


 1.5 x10^3/uL


(1.0-4.8) 


 


 





 


Monocytes # (Auto)


 0.9 x10^3/uL


(0.0-1.1) 


 


 





 


Eosinophils # (Auto)


 0.1 x10^3/uL


(0.0-0.7) 


 


 





 


Basophils # (Auto)


 0.1 x10^3/uL


(0.0-0.2) 


 


 





 


Sodium Level


 137 mmol/L


(136-145) 


 


 





 


Potassium Level


 2.7 mmol/L


(3.5-5.1) 


 


 





 


Chloride Level


 98 mmol/L


() 


 


 





 


Carbon Dioxide Level


 28 mmol/L


(21-32) 


 


 





 


Anion Gap 11 (6-14)    


 


Blood Urea Nitrogen 6 mg/dL (7-20)    


 


Creatinine


 1.1 mg/dL


(0.6-1.0) 


 


 





 


Estimated GFR


(Cockcroft-Gault) 50.3 


 


 


 





 


BUN/Creatinine Ratio 5 (6-20)    


 


Glucose Level


 104 mg/dL


(70-99) 


 


 





 


Lactic Acid Level


 2.0 mmol/L


(0.4-2.0) 


 


 





 


Calcium Level


 8.8 mg/dL


(8.5-10.1) 


 


 





 


Magnesium Level


 1.8 mg/dL


(1.8-2.4) 


 


 





 


Total Bilirubin


 0.5 mg/dL


(0.2-1.0) 


 


 





 


Aspartate Amino Transf


(AST/SGOT) 29 U/L (15-37) 


 


 


 





 


Alanine Aminotransferase


(ALT/SGPT) 62 U/L (14-59) 


 


 


 





 


Alkaline Phosphatase


 164 U/L


() 


 


 





 


Troponin I Quantitative


 < 0.017 ng/mL


(0.000-0.055) 


 


 





 


NT-Pro-B-Type Natriuretic


Peptide 965 pg/mL


(0-124) 


 


 





 


Total Protein


 8.3 g/dL


(6.4-8.2) 


 


 





 


Albumin


 3.6 g/dL


(3.4-5.0) 


 


 





 


Albumin/Globulin Ratio 0.8 (1.0-1.7)    


 


Lipase


 42 U/L


() 


 


 





 


Urine Opiates Screen  Pos (NEG)   


 


Urine Methadone Screen  Neg (NEG)   


 


Urine Barbiturates  Neg (NEG)   


 


Urine Phencyclidine Screen  Neg (NEG)   


 


Urine


Amphetamine/Methamphetamine 


 Neg (NEG) 


 


 





 


Urine Benzodiazepines Screen  Neg (NEG)   


 


Urine Cocaine Screen  Neg (NEG)   


 


Urine Cannabinoids Screen  Neg (NEG)   


 


Urine Ethyl Alcohol  Neg (NEG)   


 


O2 Saturation   95 % (92-99)  


 


Arterial Blood pH


 


 


 7.42


(7.35-7.45) 





 


Arterial Blood pCO2 at


Patient Temp 


 


 37 mmHg


(35-46) 





 


Arterial Blood pO2 at Patient


Temp 


 


 73 mmHg


() 





 


Arterial Blood HCO3


 


 


 24 mmol/L


(21-28) 





 


Arterial Blood Base Excess


 


 


 0 mmol/L


(-3-3) 





 


FiO2   28%  


 


Influenza Type A Antigen


 


 


 


 Negative


(NEGATIVE)


 


Influenza Type B Antigen


 


 


 


 Negative


(NEGATIVE)


 


Test


 1/31/21


18:51 


 


 





 


Troponin I Quantitative


 < 0.017 ng/mL


(0.000-0.055) 


 


 











Laboratory Tests








Test


 1/31/21


15:00 1/31/21


15:50 1/31/21


16:49 1/31/21


17:01


 


White Blood Count


 10.2 x10^3/uL


(4.0-11.0) 


 


 





 


Red Blood Count


 3.45 x10^6/uL


(3.50-5.40) 


 


 





 


Hemoglobin


 12.1 g/dL


(12.0-15.5) 


 


 





 


Hematocrit


 34.9 %


(36.0-47.0) 


 


 





 


Mean Corpuscular Volume


 101 fL


() 


 


 





 


Mean Corpuscular Hemoglobin 35 pg (25-35)    


 


Mean Corpuscular Hemoglobin


Concent 35 g/dL


(31-37) 


 


 





 


Red Cell Distribution Width


 15.3 %


(11.5-14.5) 


 


 





 


Platelet Count


 285 x10^3/uL


(140-400) 


 


 





 


Neutrophils (%) (Auto) 75 % (31-73)    


 


Lymphocytes (%) (Auto) 15 % (24-48)    


 


Monocytes (%) (Auto) 9 % (0-9)    


 


Eosinophils (%) (Auto) 1 % (0-3)    


 


Basophils (%) (Auto) 1 % (0-3)    


 


Neutrophils # (Auto)


 7.6 x10^3/uL


(1.8-7.7) 


 


 





 


Lymphocytes # (Auto)


 1.5 x10^3/uL


(1.0-4.8) 


 


 





 


Monocytes # (Auto)


 0.9 x10^3/uL


(0.0-1.1) 


 


 





 


Eosinophils # (Auto)


 0.1 x10^3/uL


(0.0-0.7) 


 


 





 


Basophils # (Auto)


 0.1 x10^3/uL


(0.0-0.2) 


 


 





 


Sodium Level


 137 mmol/L


(136-145) 


 


 





 


Potassium Level


 2.7 mmol/L


(3.5-5.1) 


 


 





 


Chloride Level


 98 mmol/L


() 


 


 





 


Carbon Dioxide Level


 28 mmol/L


(21-32) 


 


 





 


Anion Gap 11 (6-14)    


 


Blood Urea Nitrogen 6 mg/dL (7-20)    


 


Creatinine


 1.1 mg/dL


(0.6-1.0) 


 


 





 


Estimated GFR


(Cockcroft-Gault) 50.3 


 


 


 





 


BUN/Creatinine Ratio 5 (6-20)    


 


Glucose Level


 104 mg/dL


(70-99) 


 


 





 


Lactic Acid Level


 2.0 mmol/L


(0.4-2.0) 


 


 





 


Calcium Level


 8.8 mg/dL


(8.5-10.1) 


 


 





 


Magnesium Level


 1.8 mg/dL


(1.8-2.4) 


 


 





 


Total Bilirubin


 0.5 mg/dL


(0.2-1.0) 


 


 





 


Aspartate Amino Transf


(AST/SGOT) 29 U/L (15-37) 


 


 


 





 


Alanine Aminotransferase


(ALT/SGPT) 62 U/L (14-59) 


 


 


 





 


Alkaline Phosphatase


 164 U/L


() 


 


 





 


Troponin I Quantitative


 < 0.017 ng/mL


(0.000-0.055) 


 


 





 


NT-Pro-B-Type Natriuretic


Peptide 965 pg/mL


(0-124) 


 


 





 


Total Protein


 8.3 g/dL


(6.4-8.2) 


 


 





 


Albumin


 3.6 g/dL


(3.4-5.0) 


 


 





 


Albumin/Globulin Ratio 0.8 (1.0-1.7)    


 


Lipase


 42 U/L


() 


 


 





 


Urine Opiates Screen  Pos (NEG)   


 


Urine Methadone Screen  Neg (NEG)   


 


Urine Barbiturates  Neg (NEG)   


 


Urine Phencyclidine Screen  Neg (NEG)   


 


Urine


Amphetamine/Methamphetamine 


 Neg (NEG) 


 


 





 


Urine Benzodiazepines Screen  Neg (NEG)   


 


Urine Cocaine Screen  Neg (NEG)   


 


Urine Cannabinoids Screen  Neg (NEG)   


 


Urine Ethyl Alcohol  Neg (NEG)   


 


O2 Saturation   95 % (92-99)  


 


Arterial Blood pH


 


 


 7.42


(7.35-7.45) 





 


Arterial Blood pCO2 at


Patient Temp 


 


 37 mmHg


(35-46) 





 


Arterial Blood pO2 at Patient


Temp 


 


 73 mmHg


() 





 


Arterial Blood HCO3


 


 


 24 mmol/L


(21-28) 





 


Arterial Blood Base Excess


 


 


 0 mmol/L


(-3-3) 





 


FiO2   28%  


 


Influenza Type A Antigen


 


 


 


 Negative


(NEGATIVE)


 


Influenza Type B Antigen


 


 


 


 Negative


(NEGATIVE)


 


Test


 1/31/21


18:51 


 


 





 


Troponin I Quantitative


 < 0.017 ng/mL


(0.000-0.055) 


 


 














Images:


Images


CT ABD/PELVIS





IMPRESSION:





1. No specific cause for left paraspinal lumbar pain identified on noncontrast 

CT. In particular, there is bilateral perinephric soft tissue stranding that 

could reflect renal inflammation from pyelonephritis but there are no specific 

findings to suggest that diagnosis and there are no stones or findings of 

obstructive uropathy.





2. Patient does have multilevel lumbar spinal degenerative spondylosis with mild

rightward convexity scoliosis. No acute fracture or aggressive appearing bony 

lesions.





3. There has been reaccumulation of fluid in the cystic pancreatic head mass 

that was recently aspirated. This could reflect reticulation of fluid in the 

pancreatic pseudocyst. There are wall thickening and surrounding soft tissue 

stranding could reflect inflammation. Correlate for any clinical evidence of 

acute pancreatitis. Lack of IV contrast limits more detailed evaluation.





4. Selective hypoattenuation of the right hepatic lobe could reflect geographic 

fatty infiltration but alternatively could reflect variant perfusion. Correlate 

with liver function tests and if clinically warranted, further imaging could be 

pursued with contrast enhanced CT or MRI of the abdomen, or duplex ultrasound of

the liver.





Assessment/Plan


Assessment/Plan


Acute abdominal pain due to pancreatic pseudocyst measuring 5.[address] cm on CT scan 

after CT-guided aspiration, previously 7.4 cm


Hypokalemia


CHANDRAKANT due to vasomotor nephropathy


Elevated alkaline phosphatase suggesting biliary etiology


Macrocytosis





Admit to medicine for further management


GI consult


Surgery consult for possible cyst gastrostomy which may be possible due to to 

the maturity of the pseudocyst wall


IV pain control


Continue IV fluids


PT OT


Lovenox for DVT prophylaxis


Protonix GI prophylaxis


Clear liquid diet 


Full code


Discussed with RN and SW


Disposition pending inpatient management as above


Surrogate decision maker is Yury Chand





Justifications for Admission


Other Justification


Acute pancreatitis











THOMAS NARVAEZ MD                    Jan 31, 2021 21:05

## 2021-01-31 NOTE — PHYS DOC
Past Medical History


Past Medical History:  Cancer, Hypertension, Kidney Stone, Other


Additional Past Medical Histor:  chronic pain, CERVICAL CANCER


Past Surgical History:  Cancer Surgery, Tubal ligation, Other


Additional Past Surgical Histo:  Ureteral Stent, Laparoscopy, CANCER SX


Smoking Status:  Current Every Day Smoker


Alcohol Use:  None


Drug Use:  None





General Adult


EDM:


Chief Complaint:  CHEST PAIN





HPI:


HPI:


61 yo F known by myself, was admitted a few weeks ago for acute pancreatitis 

with pseudocyst, s/p 100cc drainage by GI, presents to the ed with c/o sharp 

nonradiating left breast pain, luq abdominal pain, left-sided low back pain with

associated shortness of breath.  Patient unsure of her symptoms are related to 

her history of kidney stones or recent admission for pancreatitis. PMH - chronic

pain on dilaudid 2mg q4 hours at home, prescribed by pcp, does not see pain 

management.





Review of Systems:


Review of Systems:


Constitutional:   Denies fever or chills. []


Eyes:   Denies change in visual acuity. []


HENT:   Denies nasal congestion or sore throat. [] 


Respiratory:   Denies cough or hemoptysis


Cardiovascular:   Denies syncope or edema. [] 


GI:   Denies nausea, vomiting, bloody stools or diarrhea. [] 


:  Denies dysuria or hematuria


Musculoskeletal:   Denies back pain or joint pain or unilateral leg swelling


Integument:   Denies rash or diaphoresis


Neurologic:   Denies headache, focal weakness or sensory changes. [] 


Endocrine:   Denies polyuria or polydipsia. [] 


Lymphatic:  Denies swollen glands. [] 


Psychiatric:  Denies depression or anxiety. []





Heart Score:


Risk Factors:


Risk Factors:  DM, Current or recent (<one month) smoker, HTN, HLP, family 

history of CAD, obesity.


Risk Scores:


Score 0 - 3:  2.5% MACE over next 6 weeks - Discharge Home


Score 4 - 6:  20.3% MACE over next 6 weeks - Admit for Clinical Observation


Score 7 - 10:  72.7% MACE over next 6 weeks - Early Invasive Strategies





Current Medications:





Current Medications








 Medications


  (Trade)  Dose


 Ordered  Sig/Kimberli  Start Time


 Stop Time Status Last Admin


Dose Admin


 


 Hydromorphone HCl


  (Dilaudid)  1 mg  1X  ONCE  21 14:45


 21 14:46 DC 21 15:12


1 MG


 


 Sodium Chloride  1,000 ml @ 


 1,000 mls/hr  1X  ONCE  21 14:45


 21 15:44  21 15:11


1,000 MLS/HR











Allergies:


Allergies:





Allergies








Coded Allergies Type Severity Reaction Last Updated Verified


 


  No Known Drug Allergies    10/8/16 No











Physical Exam:


PE:





Constitutional: not in any distress, non-toxic appearance. 


HENT: Normocephalic, atraumatic,


Eyes: EOMI, conjunctiva normal, no discharge.  


Neck: Normal range of motion,  supple, 


Cardiovascular: S1/2 present, regular rhythm


Lungs & Thorax: 88% RA, Speaking in full sentences, bilateral equal chest rise, 

no tachypnea or increased work of breathing


Abdomen:  soft, obese, epigastric abdominal pain, 


Skin: Warm, dry, no erythema, no rash. [] 


Back:  pain over left lateral lumbar muscles, no CVA tenderness. [] 


Extremities: No tenderness, no cyanosis, no UL edema


Neurologic: Alert and oriented X 3, normal motor function, normal sensory 

function, no focal deficits noted. []


Psychologic: Affect normal, judgement normal, mood normal. []





EKG:


EKG:


sinus rhythm, LAD, normal intervals, T wave inversion aVL, no ST elevations or 

ST depressions U wave present





Radiology/Procedures:


Radiology/Procedures:


                                 IMAGING REPORT





                                     Signed





PATIENT: PAMELA DU  ACCOUNT: [id number]     MRN#: [id number]


: 1960           LOCATION: ER              AGE: 60


SEX: F                    EXAM DT: 21         ACCESSION#: [id number]


STATUS: REG ER            ORD. PHYSICIAN: CASSIA CANO DO


REASON: cp


PROCEDURE: PORTABLE CHEST 1V





EXAM: Chest, single view.





HISTORY: Chest pain.





COMPARISON: 2021.





FINDINGS: A frontal view of the chest is obtained. There is diffuse interstitial

 infiltrate. There is nodular opacity within the left upper lobe likely due to 

nodular infiltrate given the absence of a nodule in this region on the recent 

comparison study. There is no pleural effusion or pneumothorax. The heart is 

stable in size.





IMPRESSION: Diffuse interstitial infiltrate with superimposed nodular infiltrate

 within the left upper lobe. Follow-up to confirm resolution.





Electronically signed by: Eufemia Huston MD (2021 3:25 PM) [facility]














DICTATED and SIGNED BY:     EUFEMIA HUSTON MD


DATE:     21 3153AAL2 0


                                 IMAGING REPORT





                                     Signed





PATIENT: PAMELA DU  ACCOUNT: [id number]     MRN#: [id number]


: 1960           LOCATION: ER              AGE: 60


SEX: F                    EXAM DT: 21         ACCESSION#: [id number]


STATUS: REG ER            ORD. PHYSICIAN: CASSIA CANO DO


REASON: left paraspinal lumbar back pain


PROCEDURE: CT ABDOMEN PELVIS WO CONTRAST





EXAM: CT Abdomen and Pelvis without IV contrast





INDICATION: Reason: left paraspinal lumbar back pain / Spl. Instructions:  / 

History: 





TECHNIQUE: Multi-detector row CT images were acquired from the lung bases 

through the abdomen and pelvis without the use of IV contrast. Sagittal and 

coronal images were acquired from the transaxial data. All CT scans performed at

 this facility utilize dose optimization techniques as appropriate to the exam, 

including the following: Automated exposure control and adjustment of the mA 

and/or KV according to patient size (this includes techniques or standardized 

protocols for targeted exams where dose is indication/reason for exam).





ORAL CONTRAST: None





COMPARISON: Abdominal MRI of 2021, CT guided FNA biopsy of the abdomen of 

2021





FINDINGS: 





The absence of IV contrast limits evaluation of soft tissue pathology.





LOWER CHEST: Unremarkable





LIVER:  Right hepatic lobe hypodensity compatibe with geographic fatty 

infiltration.





BILIARY SYSTEM: Gallbladder is distended up to 5 cm transverse diameter. Bile d

ucts are not dilated.





PANCREAS:  Cystic pancreatic head mass measuring 5.6 cm is redemonstrated with 

wall thickening. This likely represents a reaccumulation of fluid in the 

pancreatic head following recent FNA biopsy.





SPLEEN:  Scattered splenic calcifications. No splenomegaly.





ADRENALS:  Unremarkable





KIDNEYS & URETERS:  Bilateral perinephric soft tissue stranding. Mild right h

ydronephrosis and hydroureter. No radiopaque stones in the ureters identified. 7

 mm inferior pole nonobstructing right renal stone is present.





BLADDER:  Unremarkable





REPRODUCTIVE ORGANS:  Unremarkable





GASTROINTESTINAL: The stomach, small bowel, and colon are unremarkable. The 

appendix is normal.





MESENTERY/PERITONEUM/RETROPERITONEUM: Surgical changes from ventral hernia mesh 

repair.





VASCULAR:  Scattered arterial calcifications. Mild ectasia of the infrarenal 

abdominal aorta to 2.4 cm without aneurysm.





LYMPH NODES:  No adenopathy





OSSEOUS & SOFT TISSUES:  L1 vertebroplasty. L5-S1 degenerative change. Mild 

rightward convexity scoliosis of the lumbar spine with facet degenerative 

changes most conspicuous on the left at L3-L4, L4-L5 and L5-S1. No acute frac

ture or aggressive appearing osseous lesions. Generalized osteopenia. Surgical 

clips along the right pelvic sidewall, right iliac chelly chain and right aspect 

of the IVC are present.





IMPRESSION:





1. No specific cause for left paraspinal lumbar pain identified on noncontrast 

CT. In particular, there is bilateral perinephric soft tissue stranding that 

could reflect renal inflammation from pyelonephritis but there are no specific 

findings to suggest that diagnosis and there are no stones or findings of obstru

ctive uropathy.





2. Patient does have multilevel lumbar spinal degenerative spondylosis with mild

 rightward convexity scoliosis. No acute fracture or aggressive appearing bony 

lesions.





3. There has been reaccumulation of fluid in the cystic pancreatic head mass 

that was recently aspirated. This could reflect reticulation of fluid in the 

pancreatic pseudocyst. There are wall thickening and surrounding soft tissue 

stranding could reflect inflammation. Correlate for any clinical evidence of 

acute pancreatitis. Lack of IV contrast limits more detailed evaluation.





4. Selective hypoattenuation of the right hepatic lobe could reflect geographic 

fatty infiltration but alternatively could reflect variant perfusion. Correlate 

with liver function tests and if clinically warranted, further imaging could be 

pursued with contrast enhanced CT or MRI of the abdomen, or duplex ultrasound of

 the liver.





Electronically signed by: Ezequiel Bustillo MD (2021 4:56 PM) [facility] [place]














DICTATED and SIGNED BY:     EZEQUIEL BUSTILLO MD


DATE:     21 5126CEU2 0





Course & Med Decision Making:


Course & Med Decision Making


Pertinent Labs and Imaging studies reviewed. (See chart for details)





Concern for bilateral interstitial infiltrate cannot exclude Covid.  Started on 

pneumonia antibiotics.  CT abdomen pelvis with reaccumulation of pancreatic 

pseudocyst.  Patient with no leukocytosis or anemia.  Lipase has returned to 

normal, LFTs have improved.  Patient has very mild acute kidney injury.  

Hypokalemia with U waves, potassium & magnesium replaced in ED. patient 

requiring oxygen.  Will admit for further medical management.  Patient stable at

 time of admission and agrees with this plan.





I have spoken with the patient and/or caregivers.  I have explained the 

patient's condition, diagnosis and treatment plan based on the information 

available to me at this time.  I have answered the patient's and/or caregivers 

questions and answered any concerns.  The patient and/or caregivers have as good

 an understanding of the patient's diagnosis, condition and treatment plan as 

can be expected at this point.  The patient has been stabilized within the 

capability of the emergency department.  The patient will be transported for 

further care and management or will be moved to an observation or inpatient 

service.  I have communicated with the staff or medical practitioner taking over

 this patient's care.





Dragon Disclaimer:


Dragon Disclaimer:


This electronic medical record was generated, in whole or in part, using a voice

 recognition dictation system.





Departure


Departure


Impression:  


   Primary Impression:  


   Interstitial pneumonia of both lungs


   Additional Impressions:  


   Hypokalemia


   CHANDRAKANT (acute kidney injury)


   Person under investigation for COVID-19


Disposition:  09 ADMITTED AS INPT THIS HOSP


Admitting Physician:  ANGELIKA (Dr. Shine)


Condition:  STABLE


Referrals:  


ANDRES MICHELE MD (PCP)











CASSIA CANO DO               2021 15:16

## 2021-02-01 VITALS — SYSTOLIC BLOOD PRESSURE: 181 MMHG | DIASTOLIC BLOOD PRESSURE: 103 MMHG

## 2021-02-01 VITALS — SYSTOLIC BLOOD PRESSURE: 167 MMHG | DIASTOLIC BLOOD PRESSURE: 83 MMHG

## 2021-02-01 VITALS — DIASTOLIC BLOOD PRESSURE: 100 MMHG | SYSTOLIC BLOOD PRESSURE: 165 MMHG

## 2021-02-01 LAB
ANION GAP SERPL CALC-SCNC: 7 MMOL/L (ref 6–14)
BASOPHILS # BLD AUTO: 0.1 X10^3/UL (ref 0–0.2)
BASOPHILS NFR BLD: 1 % (ref 0–3)
BUN SERPL-MCNC: 4 MG/DL (ref 7–20)
CALCIUM SERPL-MCNC: 8.5 MG/DL (ref 8.5–10.1)
CHLORIDE SERPL-SCNC: 106 MMOL/L (ref 98–107)
CO2 SERPL-SCNC: 29 MMOL/L (ref 21–32)
CREAT SERPL-MCNC: 0.8 MG/DL (ref 0.6–1)
EOSINOPHIL NFR BLD: 0.2 X10^3/UL (ref 0–0.7)
EOSINOPHIL NFR BLD: 3 % (ref 0–3)
ERYTHROCYTE [DISTWIDTH] IN BLOOD BY AUTOMATED COUNT: 15.5 % (ref 11.5–14.5)
GFR SERPLBLD BASED ON 1.73 SQ M-ARVRAT: 72.7 ML/MIN
GLUCOSE SERPL-MCNC: 93 MG/DL (ref 70–99)
HCT VFR BLD CALC: 31.5 % (ref 36–47)
HGB BLD-MCNC: 10.6 G/DL (ref 12–15.5)
LYMPHOCYTES # BLD: 1.4 X10^3/UL (ref 1–4.8)
LYMPHOCYTES NFR BLD AUTO: 26 % (ref 24–48)
MAGNESIUM SERPL-MCNC: 2.3 MG/DL (ref 1.8–2.4)
MCH RBC QN AUTO: 34 PG (ref 25–35)
MCHC RBC AUTO-ENTMCNC: 34 G/DL (ref 31–37)
MCV RBC AUTO: 102 FL (ref 79–100)
MONO #: 0.5 X10^3/UL (ref 0–1.1)
MONOCYTES NFR BLD: 10 % (ref 0–9)
NEUT #: 3.2 X10^3/UL (ref 1.8–7.7)
NEUTROPHILS NFR BLD AUTO: 60 % (ref 31–73)
PHOSPHATE SERPL-MCNC: 2.7 MG/DL (ref 2.6–4.7)
PLATELET # BLD AUTO: 253 X10^3/UL (ref 140–400)
POTASSIUM SERPL-SCNC: 3.7 MMOL/L (ref 3.5–5.1)
RBC # BLD AUTO: 3.09 X10^6/UL (ref 3.5–5.4)
SODIUM SERPL-SCNC: 142 MMOL/L (ref 136–145)
WBC # BLD AUTO: 5.4 X10^3/UL (ref 4–11)

## 2021-02-01 RX ADMIN — PANTOPRAZOLE SODIUM SCH MG: 40 TABLET, DELAYED RELEASE ORAL at 07:38

## 2021-02-01 RX ADMIN — BACITRACIN SCH MLS/HR: 5000 INJECTION, POWDER, FOR SOLUTION INTRAMUSCULAR at 07:38

## 2021-02-01 RX ADMIN — PANTOPRAZOLE SODIUM SCH MG: 40 TABLET, DELAYED RELEASE ORAL at 08:39

## 2021-02-01 NOTE — PDOC3
Discharge Summary


Visit Information


Date of Admission:  2021


Date of Discharge:  2021


Admitting Diagnosis Comment:


Acute abdominal pain due to pancreatic pseudocyst measuring 5.8 cm on CT scan 

after CT-guided aspiration, previously 7.4 cm


Hypokalemia


CHANDRAKANT due to vasomotor nephropathy


Elevated alkaline phosphatase suggesting biliary etiology


Macrocytosis


Final Diagnosis


Problems


Medical Problems:


Acute abdominal pain due to pancreatic pseudocyst measuring 5.8 cm on CT scan 

after CT-guided aspiration, previously 7.4 cm


Hypokalemia


CHANDRAKANT due to vasomotor nephropathy improved


Elevated alkaline phosphatase


Macrocytosis





Brief Hospital Course


Allergies





                                    Allergies








Coded Allergies Type Severity Reaction Last Updated Verified


 


  No Known Drug Allergies    10/8/16 No








Vital Signs





Vital Signs








  Date Time  Temp Pulse Resp B/P (MAP) Pulse Ox O2 Delivery O2 Flow Rate FiO2


 


21 14:24     90 Nasal Cannula  


 


21 11:00 94.6 60 22 167/83 (111)   2.0 





 94.6       








Lab Results





Laboratory Tests








Test


 21


15:00 21


15:50 21


16:49 21


17:01


 


White Blood Count


 10.2 x10^3/uL


(4.0-11.0) 


 


 





 


Red Blood Count


 3.45 x10^6/uL


(3.50-5.40) 


 


 





 


Hemoglobin


 12.1 g/dL


(12.0-15.5) 


 


 





 


Hematocrit


 34.9 %


(36.0-47.0) 


 


 





 


Mean Corpuscular Volume


 101 fL


() 


 


 





 


Mean Corpuscular Hemoglobin 35 pg (25-35)    


 


Mean Corpuscular Hemoglobin


Concent 35 g/dL


(31-37) 


 


 





 


Red Cell Distribution Width


 15.3 %


(11.5-14.5) 


 


 





 


Platelet Count


 285 x10^3/uL


(140-400) 


 


 





 


Neutrophils (%) (Auto) 75 % (31-73)    


 


Lymphocytes (%) (Auto) 15 % (24-48)    


 


Monocytes (%) (Auto) 9 % (0-9)    


 


Eosinophils (%) (Auto) 1 % (0-3)    


 


Basophils (%) (Auto) 1 % (0-3)    


 


Neutrophils # (Auto)


 7.6 x10^3/uL


(1.8-7.7) 


 


 





 


Lymphocytes # (Auto)


 1.5 x10^3/uL


(1.0-4.8) 


 


 





 


Monocytes # (Auto)


 0.9 x10^3/uL


(0.0-1.1) 


 


 





 


Eosinophils # (Auto)


 0.1 x10^3/uL


(0.0-0.7) 


 


 





 


Basophils # (Auto)


 0.1 x10^3/uL


(0.0-0.2) 


 


 





 


Sodium Level


 137 mmol/L


(136-145) 


 


 





 


Potassium Level


 2.7 mmol/L


(3.5-5.1) 


 


 





 


Chloride Level


 98 mmol/L


() 


 


 





 


Carbon Dioxide Level


 28 mmol/L


(21-32) 


 


 





 


Anion Gap 11 (6-14)    


 


Blood Urea Nitrogen 6 mg/dL (7-20)    


 


Creatinine


 1.1 mg/dL


(0.6-1.0) 


 


 





 


Estimated GFR


(Cockcroft-Gault) 50.3 


 


 


 





 


BUN/Creatinine Ratio 5 (6-20)    


 


Glucose Level


 104 mg/dL


(70-99) 


 


 





 


Lactic Acid Level


 2.0 mmol/L


(0.4-2.0) 


 


 





 


Calcium Level


 8.8 mg/dL


(8.5-10.1) 


 


 





 


Magnesium Level


 1.8 mg/dL


(1.8-2.4) 


 


 





 


Total Bilirubin


 0.5 mg/dL


(0.2-1.0) 


 


 





 


Aspartate Amino Transf


(AST/SGOT) 29 U/L (15-37) 


 


 


 





 


Alanine Aminotransferase


(ALT/SGPT) 62 U/L (14-59) 


 


 


 





 


Alkaline Phosphatase


 164 U/L


() 


 


 





 


Troponin I Quantitative


 < 0.017 ng/mL


(0.000-0.055) 


 


 





 


NT-Pro-B-Type Natriuretic


Peptide 965 pg/mL


(0-124) 


 


 





 


Total Protein


 8.3 g/dL


(6.4-8.2) 


 


 





 


Albumin


 3.6 g/dL


(3.4-5.0) 


 


 





 


Albumin/Globulin Ratio 0.8 (1.0-1.7)    


 


Lipase


 42 U/L


() 


 


 





 


Urine Opiates Screen  Pos (NEG)   


 


Urine Methadone Screen  Neg (NEG)   


 


Urine Barbiturates  Neg (NEG)   


 


Urine Phencyclidine Screen  Neg (NEG)   


 


Urine


Amphetamine/Methamphetamine 


 Neg (NEG) 


 


 





 


Urine Benzodiazepines Screen  Neg (NEG)   


 


Urine Cocaine Screen  Neg (NEG)   


 


Urine Cannabinoids Screen  Neg (NEG)   


 


Urine Ethyl Alcohol  Neg (NEG)   


 


O2 Saturation   95 % (92-99)  


 


Arterial Blood pH


 


 


 7.42


(7.35-7.45) 





 


Arterial Blood pCO2 at


Patient Temp 


 


 37 mmHg


(35-46) 





 


Arterial Blood pO2 at Patient


Temp 


 


 73 mmHg


() 





 


Arterial Blood HCO3


 


 


 24 mmol/L


(21-28) 





 


Arterial Blood Base Excess


 


 


 0 mmol/L


(-3-3) 





 


FiO2   28%  


 


Coronavirus (PCR)


 


 


 


 Not detected


(Not Detected)


 


Influenza Type A Antigen


 


 


 


 Negative


(NEGATIVE)


 


Influenza Type B Antigen


 


 


 


 Negative


(NEGATIVE)


 


Test


 21


18:51 21


21:50 21


07:02 





 


Troponin I Quantitative


 < 0.017 ng/mL


(0.000-0.055) < 0.017 ng/mL


(0.000-0.055) 


 





 


White Blood Count


 


 


 5.4 x10^3/uL


(4.0-11.0) 





 


Red Blood Count


 


 


 3.09 x10^6/uL


(3.50-5.40) 





 


Hemoglobin


 


 


 10.6 g/dL


(12.0-15.5) 





 


Hematocrit


 


 


 31.5 %


(36.0-47.0) 





 


Mean Corpuscular Volume


 


 


 102 fL


() 





 


Mean Corpuscular Hemoglobin   34 pg (25-35)  


 


Mean Corpuscular Hemoglobin


Concent 


 


 34 g/dL


(31-37) 





 


Red Cell Distribution Width


 


 


 15.5 %


(11.5-14.5) 





 


Platelet Count


 


 


 253 x10^3/uL


(140-400) 





 


Neutrophils (%) (Auto)   60 % (31-73)  


 


Lymphocytes (%) (Auto)   26 % (24-48)  


 


Monocytes (%) (Auto)   10 % (0-9)  


 


Eosinophils (%) (Auto)   3 % (0-3)  


 


Basophils (%) (Auto)   1 % (0-3)  


 


Neutrophils # (Auto)


 


 


 3.2 x10^3/uL


(1.8-7.7) 





 


Lymphocytes # (Auto)


 


 


 1.4 x10^3/uL


(1.0-4.8) 





 


Monocytes # (Auto)


 


 


 0.5 x10^3/uL


(0.0-1.1) 





 


Eosinophils # (Auto)


 


 


 0.2 x10^3/uL


(0.0-0.7) 





 


Basophils # (Auto)


 


 


 0.1 x10^3/uL


(0.0-0.2) 





 


Sodium Level


 


 


 142 mmol/L


(136-145) 





 


Potassium Level


 


 


 3.7 mmol/L


(3.5-5.1) 





 


Chloride Level


 


 


 106 mmol/L


() 





 


Carbon Dioxide Level


 


 


 29 mmol/L


(21-32) 





 


Anion Gap   7 (6-14)  


 


Blood Urea Nitrogen   4 mg/dL (7-20)  


 


Creatinine


 


 


 0.8 mg/dL


(0.6-1.0) 





 


Estimated GFR


(Cockcroft-Gault) 


 


 72.7 


 





 


Glucose Level


 


 


 93 mg/dL


(70-99) 





 


Calcium Level


 


 


 8.5 mg/dL


(8.5-10.1) 





 


Phosphorus Level


 


 


 2.7 mg/dL


(2.6-4.7) 





 


Magnesium Level


 


 


 2.3 mg/dL


(1.8-2.4) 











Laboratory Tests








Test


 21


18:51 21


21:50 21


07:02


 


Troponin I Quantitative


 < 0.017 ng/mL


(0.000-0.055) < 0.017 ng/mL


(0.000-0.055) 





 


White Blood Count


 


 


 5.4 x10^3/uL


(4.0-11.0)


 


Red Blood Count


 


 


 3.09 x10^6/uL


(3.50-5.40)


 


Hemoglobin


 


 


 10.6 g/dL


(12.0-15.5)


 


Hematocrit


 


 


 31.5 %


(36.0-47.0)


 


Mean Corpuscular Volume


 


 


 102 fL


()


 


Mean Corpuscular Hemoglobin   34 pg (25-35) 


 


Mean Corpuscular Hemoglobin


Concent 


 


 34 g/dL


(31-37)


 


Red Cell Distribution Width


 


 


 15.5 %


(11.5-14.5)


 


Platelet Count


 


 


 253 x10^3/uL


(140-400)


 


Neutrophils (%) (Auto)   60 % (31-73) 


 


Lymphocytes (%) (Auto)   26 % (24-48) 


 


Monocytes (%) (Auto)   10 % (0-9) 


 


Eosinophils (%) (Auto)   3 % (0-3) 


 


Basophils (%) (Auto)   1 % (0-3) 


 


Neutrophils # (Auto)


 


 


 3.2 x10^3/uL


(1.8-7.7)


 


Lymphocytes # (Auto)


 


 


 1.4 x10^3/uL


(1.0-4.8)


 


Monocytes # (Auto)


 


 


 0.5 x10^3/uL


(0.0-1.1)


 


Eosinophils # (Auto)


 


 


 0.2 x10^3/uL


(0.0-0.7)


 


Basophils # (Auto)


 


 


 0.1 x10^3/uL


(0.0-0.2)


 


Sodium Level


 


 


 142 mmol/L


(136-145)


 


Potassium Level


 


 


 3.7 mmol/L


(3.5-5.1)


 


Chloride Level


 


 


 106 mmol/L


()


 


Carbon Dioxide Level


 


 


 29 mmol/L


(21-32)


 


Anion Gap   7 (6-14) 


 


Blood Urea Nitrogen   4 mg/dL (7-20) 


 


Creatinine


 


 


 0.8 mg/dL


(0.6-1.0)


 


Estimated GFR


(Cockcroft-Gault) 


 


 72.7 





 


Glucose Level


 


 


 93 mg/dL


(70-99)


 


Calcium Level


 


 


 8.5 mg/dL


(8.5-10.1)


 


Phosphorus Level


 


 


 2.7 mg/dL


(2.6-4.7)


 


Magnesium Level


 


 


 2.3 mg/dL


(1.8-2.4)








Brief Hospital Course


History of Present Illness:


HPI:


Patient is a 60-year-old female with past medical history of acute pancreatitis 

with pseudocyst that was recently drained by GI last week with 100 cc fluid 

output.  Who comes to the ED due to abdominal pain 8 out of 10.  Patient states 

that the pain occurs in the lower back which she does have chronic pain for and 

also in the epigastric and left upper quadrant region.  Has been occurring for 

the past week.  Patient does also take oxycodone chronically for her back pain. 

Denies any fevers, shortness of breath, diarrhea, pale stools, dark urine, or 

bloody stools.  Patient in the ED was found to have oxygen level 88% and 2 L 

oxygen was placed and she improved with 94%.  Denies any sick Covid contacts.





Patient admitted to the medical floor and she was kept n.p.o. awaiting for 

consultant recommendations.  Recommendations from our surgical consultant were 

as follows:





pancreatic pseudocyst


reviewed with Dr Lloyd


would recommend [facility] evaluation and arrange for EUS and therapeutic endoscopic 

drainage --have discussed with Nurse, COURTNEY CHA SCOTT D MD [id number]:


CONSULT


Assessment/Plan


Assessment/Plan


Persistent pseudocyst,  best long term treatment with internal drainage 

(cystgastrostomy);  historically done surgically (open or laparoscopic 

approaches),  however with endoscopic ultrasound can be done endoscopically with

less morbidity.  Recommend EUS with therapeutic drainage.  Would proceed with 

open or laparoscopic approach only if unable or unsuccessful with endoscopic 

approach





In light of the recommendations the patient was willing to try more solid food 

because she was tired of the clear liquid diet.  She will be following up with 

[facility] and hopefully will be able to have the procedure done at that institution in 

order to address the underlying etiology of her disease.  She is in no acute 

distress at the time of my evaluation she was in good spirits to be going home 

later if she tolerated her diet





GEN:   No apparent distress.  Alert and oriented


HEENT:   Normal cephalic, atraumatic, external auditory canals are patent.  

Mucous membranes are moist but appears to be pale as well.


EYES:   Extraocular muscles are intact, pupil are equally round and reactive to 

light and accommodation.  Conjunctive does appear to be pale


MUSCULOSKELETAL:  Well developed , well nourished, good range of motion


ENDOCRINE:   No thyromegaly was palpated


LYMPHATICS:   No cervical chain or axillary nodes were noted


HEMATOPOIETIC:  No bruising


NECK:   Supple, no JVD, no thyromegaly was noted


LUNGS:   Clear to auscultation in all lung fields without rhonchi or wheezing


HEART:    RRR, S!, S2 present.  Peripheral pulses intact, no obvious murmurs 

noted


ABDOMEN:   Soft, nontender.  Positive bowel sounds, no organomegaly, normal 

bowel sounds


EXTREMITIES:   Without clubbing, cyanosis, or edema.  Pedal pulses intact.  

Negative Homans sign


NEUROLOGIC:   Normal speech and tone.  A&O x 3, moves all extremities, no 

obvious focal deficits


PSYCHIATRIC:   Normal affect, normal mood. Stable


SKIN:   No ulcerations or rashes, good skin turgor, no jaundice


VASCULAR:   Good capillary refill, neurovascular bundle appears to be intact





Assessment


Assessment


                                 IMAGING REPORT





                                     Signed





PATIENT: PAMELA DU  ACCOUNT: [id number]     MRN#: [id number]


: 1960           LOCATION: ER              AGE: 60


SEX: F                    EXAM DT: 21         ACCESSION#: [id number]


STATUS: REG ER            ORD. PHYSICIAN: CASSIA CANO DO


REASON: left paraspinal lumbar back pain


PROCEDURE: CT ABDOMEN PELVIS WO CONTRAST





EXAM: CT Abdomen and Pelvis without IV contrast





INDICATION: Reason: left paraspinal lumbar back pain / Spl. Instructions:  / 

History: 





TECHNIQUE: Multi-detector row CT images were acquired from the lung bases 

through the abdomen and pelvis without the use of IV contrast. Sagittal and 

coronal images were acquired from the transaxial data. All CT scans performed at

this facility utilize dose optimization techniques as appropriate to the exam, 

including the following: Automated exposure control and adjustment of the mA 

and/or KV according to patient size (this includes techniques or standardized 

protocols for targeted exams where dose is indication/reason for exam).





ORAL CONTRAST: None





COMPARISON: Abdominal MRI of 2021, CT guided FNA biopsy of the abdomen of 

2021





FINDINGS: 





The absence of IV contrast limits evaluation of soft tissue pathology.





LOWER CHEST: Unremarkable





LIVER:  Right hepatic lobe hypodensity compatibe with geographic fatty 

infiltration.





BILIARY SYSTEM: Gallbladder is distended up to 5 cm transverse diameter. Bile 

ducts are not dilated.





PANCREAS:  Cystic pancreatic head mass measuring 5.6 cm is redemonstrated with 

wall thickening. This likely represents a reaccumulation of fluid in the 

pancreatic head following recent FNA biopsy.





SPLEEN:  Scattered splenic calcifications. No splenomegaly.





ADRENALS:  Unremarkable





KIDNEYS & URETERS:  Bilateral perinephric soft tissue stranding. Mild right 

hydronephrosis and hydroureter. No radiopaque stones in the ureters identified. 

7 mm inferior pole nonobstructing right renal stone is present.





BLADDER:  Unremarkable





REPRODUCTIVE ORGANS:  Unremarkable





GASTROINTESTINAL: The stomach, small bowel, and colon are unremarkable. The 

appendix is normal.





MESENTERY/PERITONEUM/RETROPERITONEUM: Surgical changes from ventral hernia mesh 

repair.





VASCULAR:  Scattered arterial calcifications. Mild ectasia of the infrarenal 

abdominal aorta to 2.4 cm without aneurysm.





LYMPH NODES:  No adenopathy





OSSEOUS & SOFT TISSUES:  L1 vertebroplasty. L5-S1 degenerative change. Mild 

rightward convexity scoliosis of the lumbar spine with facet degenerative 

changes most conspicuous on the left at L3-L4, L4-L5 and L5-S1. No acute 

fracture or aggressive appearing osseous lesions. Generalized osteopenia. 

Surgical clips along the right pelvic sidewall, right iliac chelly chain and ri

ght aspect of the IVC are present.





IMPRESSION:





1. No specific cause for left paraspinal lumbar pain identified on noncontrast 

CT. In particular, there is bilateral perinephric soft tissue stranding that 

could reflect renal inflammation from pyelonephritis but there are no specific 

findings to suggest that diagnosis and there are no stones or findings of 

obstructive uropathy.





2. Patient does have multilevel lumbar spinal degenerative spondylosis with mild

rightward convexity scoliosis. No acute fracture or aggressive appearing bony 

lesions.





3. There has been reaccumulation of fluid in the cystic pancreatic head mass 

that was recently aspirated. This could reflect reticulation of fluid in the 

pancreatic pseudocyst. There are wall thickening and surrounding soft tissue 

stranding could reflect inflammation. Correlate for any clinical evidence of 

acute pancreatitis. Lack of IV contrast limits more detailed evaluation.





4. Selective hypoattenuation of the right hepatic lobe could reflect geographic 

fatty infiltration but alternatively could reflect variant perfusion. Correlate 

with liver function tests and if clinically warranted, further imaging could be 

pursued with contrast enhanced CT or MRI of the abdomen, or duplex ultrasound of

the liver.





Electronically signed by: Nathaniel Bustillo MD (2021 4:56 PM) [facility] [place] [facility] [place]





Discharge Information


Condition at Discharge:  Improved


Follow Up:  Weeks


Disposition/Orders:  D/C to Home


Scheduled


Amlodipine Besylate (Amlodipine Besylate) 10 Mg Tablet, 10 MG PO DAILY for 30 

Days, #30


   Prescribed by: RONNIE BELL on 17


   Last Action: Continued on 21 by THOMAS NARVAEZ MD


Levothyroxine Sodium (Levothyroxine Sodium) 150 Mcg Tablet, 150 MCG PO DAILY06 

for 30 Days, #30 Ref 5


   Prescribed by: NOY MICHELE MD on 17 174


   Last Action: Continued on 21 by THOMAS NARVAEZ MD


Lidocaine (Lidocaine PATCH  **) 1 Each Adh..patch, 1 EACH TP DAILY for FOR LOCAL

PAIN, #10


   REMOVE AFTER 12 HOURS 


   Prescribed by: HEATHER PAUL D.O. on 19


   Last Action: HELD on 21 by THOMAS NARVAEZ MD


Losartan Potassium (Losartan Potassium) 100 Mg Tablet, 100 MG PO DAILY for 30 

Days, #30


   Prescribed by: RONNIE BELL on 17


   Last Action: HELD on 21 by THOMAS NARVAEZ MD


Pantoprazole Sodium (Pantoprazole Sodium  **) 40 Mg Tablet.dr, 40 MG PO DAILYAC 

for GERD for 30 Days, #30


   Prescribed by: ALEJANDRA YIN MD on 21 1551


   Last Action: HELD on 21 by THOMAS NARVAEZ MD


Phenazopyridine Hcl (Pyridium) 100 Mg Tablet, 100 MG PO TID for dysuria, #10


   Prescribed by: KELLE HATFIELD MD on 10/28/19 122


   Last Action: HELD on 21 by THOMAS NARVAEZ MD


Sumatriptan Succinate (Imitrex) 100 Mg Tablet, 1 TAB PO UD, #9 Ref 1


   Prescribed by: RONNIE BELL on 17 120


   Last Action: HELD on 21 by THOMAS NARVAEZ MD





Scheduled PRN


Acetaminophen (Tylenol) 325 Mg Tablet, 650 MG PO PRN Q6HRS PRN for Headaches, 

Temp > 101.5F for 14 Days, #90


   Prescribed by: ALEJANDRA YIN MD on 21 1551


Cyclobenzaprine Hcl (Cyclobenzaprine Hcl) 10 Mg Tablet, 1 TAB PO TID PRN for 

PAIN, #30


   Prescribed by: ROSA MARIA TIRADO MD on 18 190


   Last Action: Continued on 21 by THOMAS NARVAEZ MD


Lidocaine (Lidocaine PATCH  **) 1 Each Adh..patch, 1 EACH TP DAILY PRN for PAIN,

#10


   Prescribed by: ROSA MARIA TIRADO MD on 18


   Last Action: HELD on 21 by THOMAS NARVAEZ MD


Mag Hydrox/Al Hydrox/Simeth (Mag-Al Plus Xs Suspension) 30 Ml Oral.susp, 30 ML 

PO PRN Q3HRS PRN for HEARTBURN / GAS for 14 Days, #240


   Prescribed by: ALEJANDRA YIN MD on 21 1551


Oxycodone Hcl (Oxycodone Hcl Immed.release **) 5 Mg Tablet, 30 MG PO PRN Q4HRS 

PRN for PAIN, (Reported)


   Entered as Reported by: ANGELIA QUEZADA on 14 1626


   Last Action: Continued on 21 by THOMAS NARVAEZ MD





Discontinued Medications


Amoxicillin/Potassium Clav (Augmentin 500-125 Tablet) 1 Each Tablet, 1 TAB PO 

BID for UTI for 10 Days, #20 Ref 0


   Prescribed by: ALEJANDRA YIN MD on 21 1551


   Last Action: HELD on 21 by THOMAS NARVAEZ MD


Ciprofloxacin Hcl (Cipro) 250 Mg Tablet, 1 TAB PO BID for infection, #14


   Prescribed by: KELLE HATFIELD MD on 10/28/19 1222


Cyclobenzaprine Hcl (Cyclobenzaprine Hcl) 10 Mg Tablet, 1 TAB PO TID for 30 

Days, #90 Ref 1


   Prescribed by: NOY MICHELE MD on 17 1744


Cyclobenzaprine Hcl (Cyclobenzaprine Hcl) 10 Mg Tablet, 1 TAB PO TID PRN for 

MUSCLE SPASMS, #30


   Prescribed by: FELISA BAXTER on 17 2328


Diclofenac Sodium (Diclofenac Sodium) 50 Mg Tablet.dr, 1 TAB PO BID, #20 Ref 0


   Prescribed by: ROSA MARIA TIRADO MD on 18 1906


Dicyclomine Hcl (Bentyl) 10 Mg/1 Ml Ampul, 10 MG IM BID, #10


   Prescribed by: ROSA MARIA WINSTON on 3/7/18 2103


Hydrochlorothiazide (Hydrochlorothiazide Tablet  **) 25 Mg Tablet, 1 TAB PO 

DAILY, #30 Ref 5


   Prescribed by: RONNIE BELL on 17 1202


Levofloxacin (Levaquin) 500 Mg Tablet, 1 TAB PO DAILY, #3


   Prescribed by: FELISA BAXTER on 17 2328


Nitrofurantoin Monohyd/M-Cryst (Macrobid 100 Mg Capsule) 100 Mg Capsule, 1 CAP 

PO BID, #14


   Prescribed by: PINA SHEA on 17 1556





Justicifation of Admission Dx:


Justifications for Admission:


Justification of Admission Dx:  Yes











JINA LÓPEZ MD              2021 17:58

## 2021-02-01 NOTE — EKG
[facility]

              [address] [place] [address]

Test Date:    2021               Test Time:    14:33:32

Pat Name:     PAMELA DU            Department:   

Patient ID:   [id number]           Room:         [facility]

Gender:       F                        Technician:   

:          1960               Requested By: CASSIA CANO

Order Number: [id number]           Reading MD:   Amauri Menendez

                                 Measurements

Intervals                              Axis          

Rate:         83                       P:            52

KY:           160                      QRS:          -16

QRSD:         92                       T:            82

QT:           384                                    

QTc:          457                                    

                           Interpretive Statements

SINUS RHYTHM

LEFTWARD AXIS

T ABNORMALITY IN HIGH LATERAL LEADS

ABNORMAL ECG

Electronically Signed On 2021 10:41:00 CST by Amauri Menendez

## 2021-02-01 NOTE — NUR
Discharge Note:



PAMELA DU



Discharge instructions and discharge home medications reviewed with Patient and a copy 
given. All questions have been answered and understanding verbalized. 



The following instructions and handouts were given: follow up instructions.



Discontinued lines and drains: 20 gauge left FA, tip intact, patient tolerated well.



Patient discharged to home with self care via self.

## 2021-02-01 NOTE — NUR
COURTNEY following for discharge planning. Spoke with RN and reviewed chart. Pt from home, 2l 02, 
COVID pending. COURTNEY phoned and faxed referral to the  transfer team for a possible hospital 
to hospital transfer for a EUS and therapeutic endoscopic drainage of pseudocyst. COURTNEY 
contacted radiology to have images clouded. SW following.

-------------------------------------------------------------------------------

Addendum: 02/01/21 at 1258 by ABEL VALDEZ

-------------------------------------------------------------------------------

Pt denied at  for hospital to hospital transfer. Pt advised to follow up out-patient with 
GI at . RN notified. Pt will likely discharge home today, self-care. No further COURTNEY needs 
at this time.

-------------------------------------------------------------------------------

Addendum: 02/01/21 at 1526 by ABEL VALDEZ

-------------------------------------------------------------------------------

COURTNEY attempted to contact [facility] ([phone][facility] today to see about scheduling pt 
an appointment. LVM with request for call back

-------------------------------------------------------------------------------

Addendum: 02/01/21 at 1642 by ABEL VALDEZ

-------------------------------------------------------------------------------

COURTNEY called pt and provided phone number to [facility] for follow up out-patient. No further SW needs 
at this time.

## 2021-02-01 NOTE — PDOC
Date of Service:


DATE: 2/1/21 


TIME: 09:43





Subjective:


Subjective:


Please see GI consult from 1/21/21 and following progress notes til 1/26.


She asked that I "just spit out my questions" this morning.  She has many 

complaints - nursing, trying to go to the restroom with her IV, the doctor on 

call for her PCP, being NPO, not knowing the plan, etc. 


Back to ER w/ "chest pain."  Pain was manageable at home after discharge for 

awhile and then got worse.  Says she was able to eat and drink without issue.  


I explained possible next steps - need for surgery opinion - she says "we knew 

that last time, haven't you got anything else to say?"





Objective:


Vital Signs:





                                   Vital Signs








  Date Time  Temp Pulse Resp B/P (MAP) Pulse Ox O2 Delivery O2 Flow Rate FiO2


 


2/1/21 08:39     99 Nasal Cannula 2.0 


 


2/1/21 08:39  64  181/103    


 


2/1/21 07:00 96.2  22     





 96.2       








Labs:





Laboratory Tests








Test


 1/31/21


15:00 1/31/21


15:50 1/31/21


16:49 1/31/21


17:01


 


White Blood Count 10.2 x10^3/uL    


 


Red Blood Count 3.45 x10^6/uL    


 


Hemoglobin 12.1 g/dL    


 


Hematocrit 34.9 %    


 


Mean Corpuscular Volume 101 fL    


 


Mean Corpuscular Hemoglobin 35 pg    


 


Mean Corpuscular Hemoglobin


Concent 35 g/dL 


 


 


 





 


Red Cell Distribution Width 15.3 %    


 


Platelet Count 285 x10^3/uL    


 


Neutrophils (%) (Auto) 75 %    


 


Lymphocytes (%) (Auto) 15 %    


 


Monocytes (%) (Auto) 9 %    


 


Eosinophils (%) (Auto) 1 %    


 


Basophils (%) (Auto) 1 %    


 


Neutrophils # (Auto) 7.6 x10^3/uL    


 


Lymphocytes # (Auto) 1.5 x10^3/uL    


 


Monocytes # (Auto) 0.9 x10^3/uL    


 


Eosinophils # (Auto) 0.1 x10^3/uL    


 


Basophils # (Auto) 0.1 x10^3/uL    


 


Sodium Level 137 mmol/L    


 


Potassium Level 2.7 mmol/L    


 


Chloride Level 98 mmol/L    


 


Carbon Dioxide Level 28 mmol/L    


 


Anion Gap 11    


 


Blood Urea Nitrogen 6 mg/dL    


 


Creatinine 1.1 mg/dL    


 


Estimated GFR


(Cockcroft-Gault) 50.3 


 


 


 





 


BUN/Creatinine Ratio 5    


 


Glucose Level 104 mg/dL    


 


Lactic Acid Level 2.0 mmol/L    


 


Calcium Level 8.8 mg/dL    


 


Magnesium Level 1.8 mg/dL    


 


Total Bilirubin 0.5 mg/dL    


 


Aspartate Amino Transf


(AST/SGOT) 29 U/L 


 


 


 





 


Alanine Aminotransferase


(ALT/SGPT) 62 U/L 


 


 


 





 


Alkaline Phosphatase 164 U/L    


 


Troponin I Quantitative < 0.017 ng/mL    


 


NT-Pro-B-Type Natriuretic


Peptide 965 pg/mL 


 


 


 





 


Total Protein 8.3 g/dL    


 


Albumin 3.6 g/dL    


 


Albumin/Globulin Ratio 0.8    


 


Lipase 42 U/L    


 


Urine Opiates Screen  Pos   


 


Urine Methadone Screen  Neg   


 


Urine Barbiturates  Neg   


 


Urine Phencyclidine Screen  Neg   


 


Urine


Amphetamine/Methamphetamine 


 Neg 


 


 





 


Urine Benzodiazepines Screen  Neg   


 


Urine Cocaine Screen  Neg   


 


Urine Cannabinoids Screen  Neg   


 


Urine Ethyl Alcohol  Neg   


 


O2 Saturation   95 %  


 


Arterial Blood pH   7.42  


 


Arterial Blood pCO2 at


Patient Temp 


 


 37 mmHg 


 





 


Arterial Blood pO2 at Patient


Temp 


 


 73 mmHg 


 





 


Arterial Blood HCO3   24 mmol/L  


 


Arterial Blood Base Excess   0 mmol/L  


 


FiO2   28%  


 


Influenza Type A Antigen    Negative 


 


Influenza Type B Antigen    Negative 


 


Test


 1/31/21


18:51 1/31/21


21:50 2/1/21


07:02 





 


Troponin I Quantitative < 0.017 ng/mL  < 0.017 ng/mL   


 


White Blood Count   5.4 x10^3/uL  


 


Red Blood Count   3.09 x10^6/uL  


 


Hemoglobin   10.6 g/dL  


 


Hematocrit   31.5 %  


 


Mean Corpuscular Volume   102 fL  


 


Mean Corpuscular Hemoglobin   34 pg  


 


Mean Corpuscular Hemoglobin


Concent 


 


 34 g/dL 


 





 


Red Cell Distribution Width   15.5 %  


 


Platelet Count   253 x10^3/uL  


 


Neutrophils (%) (Auto)   60 %  


 


Lymphocytes (%) (Auto)   26 %  


 


Monocytes (%) (Auto)   10 %  


 


Eosinophils (%) (Auto)   3 %  


 


Basophils (%) (Auto)   1 %  


 


Neutrophils # (Auto)   3.2 x10^3/uL  


 


Lymphocytes # (Auto)   1.4 x10^3/uL  


 


Monocytes # (Auto)   0.5 x10^3/uL  


 


Eosinophils # (Auto)   0.2 x10^3/uL  


 


Basophils # (Auto)   0.1 x10^3/uL  


 


Sodium Level   142 mmol/L  


 


Potassium Level   3.7 mmol/L  


 


Chloride Level   106 mmol/L  


 


Carbon Dioxide Level   29 mmol/L  


 


Anion Gap   7  


 


Blood Urea Nitrogen   4 mg/dL  


 


Creatinine   0.8 mg/dL  


 


Estimated GFR


(Cockcroft-Gault) 


 


 72.7 


 





 


Glucose Level   93 mg/dL  


 


Calcium Level   8.5 mg/dL  


 


Phosphorus Level   2.7 mg/dL  


 


Magnesium Level   2.3 mg/dL  





 


FROM LAST ADMISSION:





Pancreas cyst 1/25


 GRAM STAIN  Final  


        Final





        NO ORGANISMS SEEN.


        SQUAMOUS EPI CELL:NOT APPLICABLE


        PMN (WBCs):NONE SEEN


        Unless otherwise specified, Testing Performed by:


        [address] [id number]


        For Inquires, the Physician may contact the Microbiology


        department at [phone]





  ANAEROBIC-AEROBIC CULTURE  Final  


        Final





        No Growth on 01/26/21 at 1044


        No Growth on 01/27/21 at 1100


        No Growth on 01/30/21 at 0902


        Unless otherwise specified, Testing Performed by:


        [address] [id number]


        For Inquires, the Physician may contact the Microbiology


        department at [phone]





Fluid amylase 1/25: 3289








: 75


Imaging:


CT A/P 1/31


IMPRESSION:


1. No specific cause for left paraspinal lumbar pain identified on noncontrast 

CT. In particular, there is bilateral perinephric soft tissue stranding that 

could reflect renal inflammation from pyelonephritis but there are no specific 

findings to suggest that diagnosis and there are no stones or findings of 

obstructive uropathy.


2. Patient does have multilevel lumbar spinal degenerative spondylosis with mild

rightward convexity scoliosis. No acute fracture or aggressive appearing bony 

lesions.


3. There has been reaccumulation of fluid in the cystic pancreatic head mass 

that was recently aspirated. This could reflect reticulation of fluid in the 

pancreatic pseudocyst. There are wall thickening and surrounding soft tissue 

stranding could reflect inflammation. Correlate for any clinical evidence of 

acute pancreatitis. Lack of IV contrast limits more detailed evaluation.


4. Selective hypoattenuation of the right hepatic lobe could reflect geographic 

fatty infiltration but alternatively could reflect variant perfusion. Correlate 

with liver function tests and if clinically warranted, further imaging could be 

pursued with contrast enhanced CT or MRI of the abdomen, or duplex ultrasound of

the liver.





L-spine CT 1/31


IMPRESSION:


Lumbar spinal degenerative changes with no acute findings on noncontrast L-spine

CT to explain left-sided paraspinal lumbar back pain.





CXR 1/31


IMPRESSION: Diffuse interstitial infiltrate with superimposed nodular infiltrate

within the left upper lobe. Follow-up to confirm resolution.





PE:





GEN: NAD - she's up walking around her room with IV pole


LUNGS: putting NC back on


HEART: RRR


ABD: non-distended


NEURO/PSYCH: A & O 3, unpleasant





A/P:


Abdominal pain


Macrocytic anemia


Pancreatic cyst w/ PD and CBD compression


Hypothyroidism (recent TSH 63), chronic back pain (on narcs)


Abnormal chest imaging, r/o COVID-19





--


D/w [facility]/surgery - ?transfer to  for EUS/drainage - okay per GI.


Defer diet, etc. to others.





Justicifation of Admission Dx:


Justifications for Admission:


Justification of Admission Dx:  Yes











MELY MARIEE          Feb 1, 2021 09:54

## 2021-02-01 NOTE — PDOC2
MCTEA EPIFANIO APRN 2/1/21 0925:


CONSULT


Date of Consult


Date of Consult


DATE: 2/1/21 


TIME: 09:20





Reason for Consult


Reason for Consult:


pancreatic pseudocyst





Referring Physician


Referring Physician:


ER





Identification/Chief Complaint


Chief Complaint


abdominal pain





Source


Source:  Chart review, Patient





History of Present Illness


Reason for Visit:


Known from recent admission with pancreatic pseudocyst


IR drainage had been done, no source of pancreatitis found 


Discharged and returned with worsening LUQ pain and low back pain





Past Medical History


Cardiovascular:  HTN


Pulmonary:  Bronchitis, COPD


Musculoskeletal:   low back pain


Endocrine:  Hypothyroidism





Past Surgical History


Past Surgical History:  Hernia Repair, Other





Family History


Family History:  Family History Unknown





Social History


ALCOHOL:  none


Drugs:  None





Current Problem List


Problem List


Problems


Medical Problems:


(1) CHANDRAKANT (acute kidney injury)


Status: Acute  





(2) Hypokalemia


Status: Acute  





(3) Interstitial pneumonia of both lungs


Status: Acute  





(4) Person under investigation for COVID-19


Status: Acute  











Current Medications


Current Medications





Current Medications


Hydromorphone HCl (Dilaudid) 1 mg 1X  ONCE IVP  Last administered on 1/31/21at 

15:12;  Start 1/31/21 at 14:45;  Stop 1/31/21 at 14:46;  Status DC


Sodium Chloride 1,000 ml @  1,000 mls/hr 1X  ONCE IV  Last administered on 

1/31/21at 15:11;  Start 1/31/21 at 14:45;  Stop 1/31/21 at 15:44;  Status DC


Potassium Bicarbonate (Potassium Effervescent Tablet) 40 meq 1X  ONCE PO  Last 

administered on 1/31/21at 16:56;  Start 1/31/21 at 16:00;  Stop 1/31/21 at 

16:01;  Status DC


Magnesium Sulfate 50 ml @ 25 mls/hr 1X  ONCE IV  Last administered on 1/31/21at 

16:57;  Start 1/31/21 at 16:00;  Stop 1/31/21 at 17:59;  Status DC


Ceftriaxone Sodium (Rocephin) 1 gm 1X  ONCE IVP  Last administered on 1/31/21at 

18:42;  Start 1/31/21 at 17:00;  Stop 1/31/21 at 17:01;  Status DC


Azithromycin 250 ml @  250 mls/hr 1X  ONCE IV  Last administered on 1/31/21at 

18:43;  Start 1/31/21 at 17:00;  Stop 1/31/21 at 17:59;  Status DC


Hydromorphone HCl (Dilaudid) 1 mg 1X  ONCE IVP  Last administered on 1/31/21at 

18:42;  Start 1/31/21 at 18:45;  Stop 1/31/21 at 18:46;  Status DC


Ondansetron HCl (Zofran) 4 mg PRN Q8HRS  PRN IV NAUSEA/VOMITING;  Start 1/31/21 

at 18:45;  Stop 1/31/21 at 21:03;  Status DC


Acetaminophen (Tylenol) 650 mg PRN Q4HRS  PRN PO FEVER > 100.3'F;  Start 1/31/21

at 18:45;  Stop 1/31/21 at 21:02;  Status DC


Hydromorphone HCl (Dilaudid) 1 mg 1X  ONCE IVP  Last administered on 1/31/21at 

20:21;  Start 1/31/21 at 19:45;  Stop 1/31/21 at 19:46;  Status DC


Sennosides (Senna) 17.2 mg PRN BID  PRN PO CONSTIPATION 1ST CHOICE;  Start 

1/31/21 at 21:00


Docusate Sodium (Colace) 100 mg PRN DAILY  PRN PO HARD STOOLS;  Start 1/31/21 at

21:00


Ondansetron HCl (Zofran) 4 mg PRN Q6HRS  PRN IVP NAUSEA/VOMITING 1ST CHOICE;  

Start 1/31/21 at 21:00


Dextrose (Dextrose 50%-Water Syringe) 12.5 gm PRN Q15MIN  PRN IV SEE COMMENTS;  

Start 1/31/21 at 21:00


Sodium Chloride 1,000 ml @  100 mls/hr Q10H IV  Last administered on 2/1/21at 

07:38;  Start 1/31/21 at 21:00


Acetaminophen (Tylenol) 650 mg PRN Q4HRS  PRN PO TEMP OVER 100.4F OR MILD PAIN; 

Start 1/31/21 at 21:00


Enoxaparin Sodium (Lovenox 40mg Syringe) 40 mg Q24H SQ ;  Start 1/31/21 at 22:00


Pantoprazole Sodium (Protonix) 40 mg DAILYAC PO  Last administered on 2/1/21at 

08:39;  Start 2/1/21 at 07:30


Amlodipine Besylate (Norvasc) 10 mg DAILY PO  Last administered on 2/1/21at 

08:39;  Start 2/1/21 at 09:00


Cyclobenzaprine HCl (Flexeril) 10 mg PRN TID  PRN PO MUSCLE PAIN;  Start 1/31/21

at 21:00


Levothyroxine Sodium (Synthroid) 150 mcg DAILY06 PO ;  Start 2/1/21 at 06:00


Oxycodone HCl (Roxicodone) 30 mg PRN Q6HRS  PRN PO SEVERE PAIN 7-10 Last 

administered on 2/1/21at 08:39;  Start 1/31/21 at 21:00


Naloxone HCl (Narcan) 0.4 mg PRN Q2MIN  PRN IV SEE INSTRUCTIONS;  Start 1/31/21 

at 21:15


Sodium Chloride 1,000 ml @  25 mls/hr Q24H IV ;  Start 1/31/21 at 21:15


Hydromorphone HCl 30 ml @ 0 mls/hr CONT PRN  PRN IV PER PROTOCOL Last 

administered on 1/31/21at 21:42;  Start 1/31/21 at 21:15





Active Scripts


Active


Augmentin 500-125 Tablet (Amoxicillin/Potassium Clav) 1 Each Tablet 1 Tab PO BID

10 Days


Pantoprazole Sodium  ** (Pantoprazole Sodium) 40 Mg Tablet.dr 40 Mg PO DAILYAC 

30 Days


Mag-Al Plus Xs Suspension (Mag Hydrox/Al Hydrox/Simeth) 30 Ml Oral.susp 30 Ml PO

PRN Q3HRS PRN 14 Days


Tylenol (Acetaminophen) 325 Mg Tablet 650 Mg PO PRN Q6HRS PRN 14 Days


Pyridium (Phenazopyridine Hcl) 100 Mg Tablet 100 Mg PO TID


Lidocaine PATCH  ** (Lidocaine) 1 Each Adh..patch 1 Each TP DAILY


     REMOVE AFTER 12 HOURS


Lidocaine PATCH  ** (Lidocaine) 1 Each Adh..patch 1 Each TP DAILY PRN


Cyclobenzaprine Hcl 10 Mg Tablet 1 Tab PO TID PRN


Levothyroxine Sodium 150 Mcg Tablet 150 Mcg PO DAILY06 30 Days


Imitrex (Sumatriptan Succinate) 100 Mg Tablet 1 Tab PO UD


Losartan Potassium 100 Mg Tablet 100 Mg PO DAILY 30 Days


Amlodipine Besylate 10 Mg Tablet 10 Mg PO DAILY 30 Days


Reported


Oxycodone Hcl Immed.release ** (Oxycodone Hcl) 5 Mg Tablet 30 Mg PO PRN Q4HRS 

PRN





Allergies


Allergies:  


Coded Allergies:  


     No Known Drug Allergies (Unverified , 10/8/16)





ROS


General:  YES: Fatigue; 


   No: Chills


PSYCHOLOGICAL ROS:  No: Anxiety, Depression


Eyes:  No Blurry vision, No Double vision


HEENT:  No: Heacaches, Sore Throat


Hematological and Lymphatic:  No: Bleeding Problems, Blood Clots


Respiratory:  No: Cough, Shortness of breath


Cardiovascular:  No Chest Pain, No Palpitations


Gastrointestinal:  No Nausea, No Abdominal Pain, No Diarrhea


Genitourinary:  No Dysuria, No Retention


Musculoskeletal:  Yes Joint Pain; 


   No Muscle Pain


Neurological:  No Impaired Coord/balance, No Numbness/Tingling


Skin:  No Pruritus, No Rash





Physical Exam


General:  Alert, Oriented X3, Cooperative


HEENT:  Atraumatic, Mucous membr. moist/pink


Lungs:  Clear to auscultation, Normal air movement


Heart:  Regular rate, Normal S1, Normal S2


Abdomen:  Soft, Other (TTP LUQ)


Extremities:  No clubbing, No cyanosis


Skin:  No rashes, No breakdown


Neuro:  Normal gait, Normal speech


Psych/Mental Status:  Mental status NL, Mood NL


MUSCULOSKELETAL:  No deformity, No swelling





Vitals


VITALS





Vital Signs








  Date Time  Temp Pulse Resp B/P (MAP) Pulse Ox O2 Delivery O2 Flow Rate FiO2


 


2/1/21 08:39     99 Nasal Cannula 2.0 


 


2/1/21 08:39  64  181/103    


 


2/1/21 07:00 96.2  22     





 96.2       











Labs


Labs





Laboratory Tests








Test


 1/31/21


15:00 1/31/21


15:50 1/31/21


16:49 1/31/21


17:01


 


White Blood Count


 10.2 x10^3/uL


(4.0-11.0) 


 


 





 


Red Blood Count


 3.45 x10^6/uL


(3.50-5.40) 


 


 





 


Hemoglobin


 12.1 g/dL


(12.0-15.5) 


 


 





 


Hematocrit


 34.9 %


(36.0-47.0) 


 


 





 


Mean Corpuscular Volume


 101 fL


() 


 


 





 


Mean Corpuscular Hemoglobin 35 pg (25-35)    


 


Mean Corpuscular Hemoglobin


Concent 35 g/dL


(31-37) 


 


 





 


Red Cell Distribution Width


 15.3 %


(11.5-14.5) 


 


 





 


Platelet Count


 285 x10^3/uL


(140-400) 


 


 





 


Neutrophils (%) (Auto) 75 % (31-73)    


 


Lymphocytes (%) (Auto) 15 % (24-48)    


 


Monocytes (%) (Auto) 9 % (0-9)    


 


Eosinophils (%) (Auto) 1 % (0-3)    


 


Basophils (%) (Auto) 1 % (0-3)    


 


Neutrophils # (Auto)


 7.6 x10^3/uL


(1.8-7.7) 


 


 





 


Lymphocytes # (Auto)


 1.5 x10^3/uL


(1.0-4.8) 


 


 





 


Monocytes # (Auto)


 0.9 x10^3/uL


(0.0-1.1) 


 


 





 


Eosinophils # (Auto)


 0.1 x10^3/uL


(0.0-0.7) 


 


 





 


Basophils # (Auto)


 0.1 x10^3/uL


(0.0-0.2) 


 


 





 


Sodium Level


 137 mmol/L


(136-145) 


 


 





 


Potassium Level


 2.7 mmol/L


(3.5-5.1) 


 


 





 


Chloride Level


 98 mmol/L


() 


 


 





 


Carbon Dioxide Level


 28 mmol/L


(21-32) 


 


 





 


Anion Gap 11 (6-14)    


 


Blood Urea Nitrogen 6 mg/dL (7-20)    


 


Creatinine


 1.1 mg/dL


(0.6-1.0) 


 


 





 


Estimated GFR


(Cockcroft-Gault) 50.3 


 


 


 





 


BUN/Creatinine Ratio 5 (6-20)    


 


Glucose Level


 104 mg/dL


(70-99) 


 


 





 


Lactic Acid Level


 2.0 mmol/L


(0.4-2.0) 


 


 





 


Calcium Level


 8.8 mg/dL


(8.5-10.1) 


 


 





 


Magnesium Level


 1.8 mg/dL


(1.8-2.4) 


 


 





 


Total Bilirubin


 0.5 mg/dL


(0.2-1.0) 


 


 





 


Aspartate Amino Transf


(AST/SGOT) 29 U/L (15-37) 


 


 


 





 


Alanine Aminotransferase


(ALT/SGPT) 62 U/L (14-59) 


 


 


 





 


Alkaline Phosphatase


 164 U/L


() 


 


 





 


Troponin I Quantitative


 < 0.017 ng/mL


(0.000-0.055) 


 


 





 


NT-Pro-B-Type Natriuretic


Peptide 965 pg/mL


(0-124) 


 


 





 


Total Protein


 8.3 g/dL


(6.4-8.2) 


 


 





 


Albumin


 3.6 g/dL


(3.4-5.0) 


 


 





 


Albumin/Globulin Ratio 0.8 (1.0-1.7)    


 


Lipase


 42 U/L


() 


 


 





 


Urine Opiates Screen  Pos (NEG)   


 


Urine Methadone Screen  Neg (NEG)   


 


Urine Barbiturates  Neg (NEG)   


 


Urine Phencyclidine Screen  Neg (NEG)   


 


Urine


Amphetamine/Methamphetamine 


 Neg (NEG) 


 


 





 


Urine Benzodiazepines Screen  Neg (NEG)   


 


Urine Cocaine Screen  Neg (NEG)   


 


Urine Cannabinoids Screen  Neg (NEG)   


 


Urine Ethyl Alcohol  Neg (NEG)   


 


O2 Saturation   95 % (92-99)  


 


Arterial Blood pH


 


 


 7.42


(7.35-7.45) 





 


Arterial Blood pCO2 at


Patient Temp 


 


 37 mmHg


(35-46) 





 


Arterial Blood pO2 at Patient


Temp 


 


 73 mmHg


() 





 


Arterial Blood HCO3


 


 


 24 mmol/L


(21-28) 





 


Arterial Blood Base Excess


 


 


 0 mmol/L


(-3-3) 





 


FiO2   28%  


 


Influenza Type A Antigen


 


 


 


 Negative


(NEGATIVE)


 


Influenza Type B Antigen


 


 


 


 Negative


(NEGATIVE)


 


Test


 1/31/21


18:51 1/31/21


21:50 2/1/21


07:02 





 


Troponin I Quantitative


 < 0.017 ng/mL


(0.000-0.055) < 0.017 ng/mL


(0.000-0.055) 


 





 


White Blood Count


 


 


 5.4 x10^3/uL


(4.0-11.0) 





 


Red Blood Count


 


 


 3.09 x10^6/uL


(3.50-5.40) 





 


Hemoglobin


 


 


 10.6 g/dL


(12.0-15.5) 





 


Hematocrit


 


 


 31.5 %


(36.0-47.0) 





 


Mean Corpuscular Volume


 


 


 102 fL


() 





 


Mean Corpuscular Hemoglobin   34 pg (25-35)  


 


Mean Corpuscular Hemoglobin


Concent 


 


 34 g/dL


(31-37) 





 


Red Cell Distribution Width


 


 


 15.5 %


(11.5-14.5) 





 


Platelet Count


 


 


 253 x10^3/uL


(140-400) 





 


Neutrophils (%) (Auto)   60 % (31-73)  


 


Lymphocytes (%) (Auto)   26 % (24-48)  


 


Monocytes (%) (Auto)   10 % (0-9)  


 


Eosinophils (%) (Auto)   3 % (0-3)  


 


Basophils (%) (Auto)   1 % (0-3)  


 


Neutrophils # (Auto)


 


 


 3.2 x10^3/uL


(1.8-7.7) 





 


Lymphocytes # (Auto)


 


 


 1.4 x10^3/uL


(1.0-4.8) 





 


Monocytes # (Auto)


 


 


 0.5 x10^3/uL


(0.0-1.1) 





 


Eosinophils # (Auto)


 


 


 0.2 x10^3/uL


(0.0-0.7) 





 


Basophils # (Auto)


 


 


 0.1 x10^3/uL


(0.0-0.2) 





 


Sodium Level


 


 


 142 mmol/L


(136-145) 





 


Potassium Level


 


 


 3.7 mmol/L


(3.5-5.1) 





 


Chloride Level


 


 


 106 mmol/L


() 





 


Carbon Dioxide Level


 


 


 29 mmol/L


(21-32) 





 


Anion Gap   7 (6-14)  


 


Blood Urea Nitrogen   4 mg/dL (7-20)  


 


Creatinine


 


 


 0.8 mg/dL


(0.6-1.0) 





 


Estimated GFR


(Cockcroft-Gault) 


 


 72.7 


 





 


Glucose Level


 


 


 93 mg/dL


(70-99) 





 


Calcium Level


 


 


 8.5 mg/dL


(8.5-10.1) 





 


Phosphorus Level


 


 


 2.7 mg/dL


(2.6-4.7) 





 


Magnesium Level


 


 


 2.3 mg/dL


(1.8-2.4) 











Laboratory Tests








Test


 1/31/21


15:00 1/31/21


15:50 1/31/21


16:49 1/31/21


17:01


 


White Blood Count


 10.2 x10^3/uL


(4.0-11.0) 


 


 





 


Red Blood Count


 3.45 x10^6/uL


(3.50-5.40) 


 


 





 


Hemoglobin


 12.1 g/dL


(12.0-15.5) 


 


 





 


Hematocrit


 34.9 %


(36.0-47.0) 


 


 





 


Mean Corpuscular Volume


 101 fL


() 


 


 





 


Mean Corpuscular Hemoglobin 35 pg (25-35)    


 


Mean Corpuscular Hemoglobin


Concent 35 g/dL


(31-37) 


 


 





 


Red Cell Distribution Width


 15.3 %


(11.5-14.5) 


 


 





 


Platelet Count


 285 x10^3/uL


(140-400) 


 


 





 


Neutrophils (%) (Auto) 75 % (31-73)    


 


Lymphocytes (%) (Auto) 15 % (24-48)    


 


Monocytes (%) (Auto) 9 % (0-9)    


 


Eosinophils (%) (Auto) 1 % (0-3)    


 


Basophils (%) (Auto) 1 % (0-3)    


 


Neutrophils # (Auto)


 7.6 x10^3/uL


(1.8-7.7) 


 


 





 


Lymphocytes # (Auto)


 1.5 x10^3/uL


(1.0-4.8) 


 


 





 


Monocytes # (Auto)


 0.9 x10^3/uL


(0.0-1.1) 


 


 





 


Eosinophils # (Auto)


 0.1 x10^3/uL


(0.0-0.7) 


 


 





 


Basophils # (Auto)


 0.1 x10^3/uL


(0.0-0.2) 


 


 





 


Sodium Level


 137 mmol/L


(136-145) 


 


 





 


Potassium Level


 2.7 mmol/L


(3.5-5.1) 


 


 





 


Chloride Level


 98 mmol/L


() 


 


 





 


Carbon Dioxide Level


 28 mmol/L


(21-32) 


 


 





 


Anion Gap 11 (6-14)    


 


Blood Urea Nitrogen 6 mg/dL (7-20)    


 


Creatinine


 1.1 mg/dL


(0.6-1.0) 


 


 





 


Estimated GFR


(Cockcroft-Gault) 50.3 


 


 


 





 


BUN/Creatinine Ratio 5 (6-20)    


 


Glucose Level


 104 mg/dL


(70-99) 


 


 





 


Lactic Acid Level


 2.0 mmol/L


(0.4-2.0) 


 


 





 


Calcium Level


 8.8 mg/dL


(8.5-10.1) 


 


 





 


Magnesium Level


 1.8 mg/dL


(1.8-2.4) 


 


 





 


Total Bilirubin


 0.5 mg/dL


(0.2-1.0) 


 


 





 


Aspartate Amino Transf


(AST/SGOT) 29 U/L (15-37) 


 


 


 





 


Alanine Aminotransferase


(ALT/SGPT) 62 U/L (14-59) 


 


 


 





 


Alkaline Phosphatase


 164 U/L


() 


 


 





 


Troponin I Quantitative


 < 0.017 ng/mL


(0.000-0.055) 


 


 





 


NT-Pro-B-Type Natriuretic


Peptide 965 pg/mL


(0-124) 


 


 





 


Total Protein


 8.3 g/dL


(6.4-8.2) 


 


 





 


Albumin


 3.6 g/dL


(3.4-5.0) 


 


 





 


Albumin/Globulin Ratio 0.8 (1.0-1.7)    


 


Lipase


 42 U/L


() 


 


 





 


Urine Opiates Screen  Pos (NEG)   


 


Urine Methadone Screen  Neg (NEG)   


 


Urine Barbiturates  Neg (NEG)   


 


Urine Phencyclidine Screen  Neg (NEG)   


 


Urine


Amphetamine/Methamphetamine 


 Neg (NEG) 


 


 





 


Urine Benzodiazepines Screen  Neg (NEG)   


 


Urine Cocaine Screen  Neg (NEG)   


 


Urine Cannabinoids Screen  Neg (NEG)   


 


Urine Ethyl Alcohol  Neg (NEG)   


 


O2 Saturation   95 % (92-99)  


 


Arterial Blood pH


 


 


 7.42


(7.35-7.45) 





 


Arterial Blood pCO2 at


Patient Temp 


 


 37 mmHg


(35-46) 





 


Arterial Blood pO2 at Patient


Temp 


 


 73 mmHg


() 





 


Arterial Blood HCO3


 


 


 24 mmol/L


(21-28) 





 


Arterial Blood Base Excess


 


 


 0 mmol/L


(-3-3) 





 


FiO2   28%  


 


Influenza Type A Antigen


 


 


 


 Negative


(NEGATIVE)


 


Influenza Type B Antigen


 


 


 


 Negative


(NEGATIVE)


 


Test


 1/31/21


18:51 1/31/21


21:50 2/1/21


07:02 





 


Troponin I Quantitative


 < 0.017 ng/mL


(0.000-0.055) < 0.017 ng/mL


(0.000-0.055) 


 





 


White Blood Count


 


 


 5.4 x10^3/uL


(4.0-11.0) 





 


Red Blood Count


 


 


 3.09 x10^6/uL


(3.50-5.40) 





 


Hemoglobin


 


 


 10.6 g/dL


(12.0-15.5) 





 


Hematocrit


 


 


 31.5 %


(36.0-47.0) 





 


Mean Corpuscular Volume


 


 


 102 fL


() 





 


Mean Corpuscular Hemoglobin   34 pg (25-35)  


 


Mean Corpuscular Hemoglobin


Concent 


 


 34 g/dL


(31-37) 





 


Red Cell Distribution Width


 


 


 15.5 %


(11.5-14.5) 





 


Platelet Count


 


 


 253 x10^3/uL


(140-400) 





 


Neutrophils (%) (Auto)   60 % (31-73)  


 


Lymphocytes (%) (Auto)   26 % (24-48)  


 


Monocytes (%) (Auto)   10 % (0-9)  


 


Eosinophils (%) (Auto)   3 % (0-3)  


 


Basophils (%) (Auto)   1 % (0-3)  


 


Neutrophils # (Auto)


 


 


 3.2 x10^3/uL


(1.8-7.7) 





 


Lymphocytes # (Auto)


 


 


 1.4 x10^3/uL


(1.0-4.8) 





 


Monocytes # (Auto)


 


 


 0.5 x10^3/uL


(0.0-1.1) 





 


Eosinophils # (Auto)


 


 


 0.2 x10^3/uL


(0.0-0.7) 





 


Basophils # (Auto)


 


 


 0.1 x10^3/uL


(0.0-0.2) 





 


Sodium Level


 


 


 142 mmol/L


(136-145) 





 


Potassium Level


 


 


 3.7 mmol/L


(3.5-5.1) 





 


Chloride Level


 


 


 106 mmol/L


() 





 


Carbon Dioxide Level


 


 


 29 mmol/L


(21-32) 





 


Anion Gap   7 (6-14)  


 


Blood Urea Nitrogen   4 mg/dL (7-20)  


 


Creatinine


 


 


 0.8 mg/dL


(0.6-1.0) 





 


Estimated GFR


(Cockcroft-Gault) 


 


 72.7 


 





 


Glucose Level


 


 


 93 mg/dL


(70-99) 





 


Calcium Level


 


 


 8.5 mg/dL


(8.5-10.1) 





 


Phosphorus Level


 


 


 2.7 mg/dL


(2.6-4.7) 





 


Magnesium Level


 


 


 2.3 mg/dL


(1.8-2.4) 














Assessment/Plan


Assessment/Plan


pancreatic pseudocyst


reviewed with Dr Lloyd


would recommend [facility] evaluation and arrange for EUS and therapeutic endoscopic 

drainage --have discussed with Nurse, GI, MEKHI GALVIN MD 2/1/21 1443:


CONSULT


Assessment/Plan


Assessment/Plan


Persistent pseudocyst,  best long term treatment with internal drainage 

(cystgastrostomy);  historically done surgically (open or laparoscopic 

approaches),  however with endoscopic ultrasound can be done endoscopically with

less morbidity.  Recommend EUS with therapeutic drainage.  Would proceed with 

open or laparoscopic approach only if unable or unsuccessful with endoscopic 

approach











TEA BENTLEY             Feb 1, 2021 09:25


MEKHI LLOYD MD              Feb 1, 2021 14:43

## 2021-04-19 VITALS — SYSTOLIC BLOOD PRESSURE: 130 MMHG | DIASTOLIC BLOOD PRESSURE: 89 MMHG

## 2021-05-10 ENCOUNTER — HOSPITAL ENCOUNTER (EMERGENCY)
Dept: HOSPITAL 61 - ER | Age: 61
Discharge: LEFT BEFORE DISCHARGE | End: 2021-05-10
Payer: MEDICAID

## 2021-05-10 DIAGNOSIS — R10.9: Primary | ICD-10-CM

## 2021-05-10 DIAGNOSIS — Z53.21: ICD-10-CM

## 2021-05-10 DIAGNOSIS — M54.5: ICD-10-CM

## 2021-09-14 ENCOUNTER — HOSPITAL ENCOUNTER (INPATIENT)
Dept: HOSPITAL 61 - ER | Age: 61
LOS: 2 days | Discharge: LEFT BEFORE DISCHARGE | DRG: 177 | End: 2021-09-16
Attending: FAMILY MEDICINE | Admitting: FAMILY MEDICINE
Payer: MEDICAID

## 2021-09-14 VITALS — DIASTOLIC BLOOD PRESSURE: 93 MMHG | SYSTOLIC BLOOD PRESSURE: 169 MMHG

## 2021-09-14 VITALS — DIASTOLIC BLOOD PRESSURE: 80 MMHG | SYSTOLIC BLOOD PRESSURE: 147 MMHG

## 2021-09-14 VITALS — HEIGHT: 68 IN | WEIGHT: 190.7 LBS | BODY MASS INDEX: 28.9 KG/M2

## 2021-09-14 DIAGNOSIS — Z20.822: ICD-10-CM

## 2021-09-14 DIAGNOSIS — D75.89: ICD-10-CM

## 2021-09-14 DIAGNOSIS — J96.01: ICD-10-CM

## 2021-09-14 DIAGNOSIS — K86.3: ICD-10-CM

## 2021-09-14 DIAGNOSIS — K86.1: ICD-10-CM

## 2021-09-14 DIAGNOSIS — E66.9: ICD-10-CM

## 2021-09-14 DIAGNOSIS — E03.9: ICD-10-CM

## 2021-09-14 DIAGNOSIS — F17.210: ICD-10-CM

## 2021-09-14 DIAGNOSIS — N12: ICD-10-CM

## 2021-09-14 DIAGNOSIS — R16.0: ICD-10-CM

## 2021-09-14 DIAGNOSIS — I10: ICD-10-CM

## 2021-09-14 DIAGNOSIS — G89.29: ICD-10-CM

## 2021-09-14 DIAGNOSIS — Z82.5: ICD-10-CM

## 2021-09-14 DIAGNOSIS — J15.6: Primary | ICD-10-CM

## 2021-09-14 DIAGNOSIS — Z98.51: ICD-10-CM

## 2021-09-14 DIAGNOSIS — J44.0: ICD-10-CM

## 2021-09-14 DIAGNOSIS — K21.9: ICD-10-CM

## 2021-09-14 DIAGNOSIS — J44.1: ICD-10-CM

## 2021-09-14 DIAGNOSIS — Z85.41: ICD-10-CM

## 2021-09-14 LAB
ALBUMIN SERPL-MCNC: 3.7 G/DL (ref 3.4–5)
ALBUMIN/GLOB SERPL: 0.9 {RATIO} (ref 1–1.7)
ALP SERPL-CCNC: 109 U/L (ref 46–116)
ALT SERPL-CCNC: 45 U/L (ref 14–59)
ANION GAP SERPL CALC-SCNC: 4 MMOL/L (ref 6–14)
AST SERPL-CCNC: 11 U/L (ref 15–37)
BASE EXCESS STD BLDA CALC-SCNC: 1 MMOL/L (ref -3–3)
BASOPHILS # BLD AUTO: 0 X10^3/UL (ref 0–0.2)
BASOPHILS NFR BLD: 0 % (ref 0–3)
BILIRUB SERPL-MCNC: 0.2 MG/DL (ref 0.2–1)
BUN SERPL-MCNC: 15 MG/DL (ref 7–20)
BUN/CREAT SERPL: 14 (ref 6–20)
CALCIUM SERPL-MCNC: 9.4 MG/DL (ref 8.5–10.1)
CHLORIDE SERPL-SCNC: 100 MMOL/L (ref 98–107)
CO2 SERPL-SCNC: 33 MMOL/L (ref 21–32)
CREAT SERPL-MCNC: 1.1 MG/DL (ref 0.6–1)
EOSINOPHIL NFR BLD: 0.1 X10^3/UL (ref 0–0.7)
EOSINOPHIL NFR BLD: 2 % (ref 0–3)
ERYTHROCYTE [DISTWIDTH] IN BLOOD BY AUTOMATED COUNT: 14.6 % (ref 11.5–14.5)
GFR SERPLBLD BASED ON 1.73 SQ M-ARVRAT: 50.2 ML/MIN
GLUCOSE SERPL-MCNC: 135 MG/DL (ref 70–99)
HCO3 BLDA-SCNC: 26 MMOL/L (ref 21–28)
HCT VFR BLD CALC: 39.1 % (ref 36–47)
HGB BLD-MCNC: 13.7 G/DL (ref 12–15.5)
LYMPHOCYTES # BLD: 1.1 X10^3/UL (ref 1–4.8)
LYMPHOCYTES NFR BLD AUTO: 11 % (ref 24–48)
MAGNESIUM SERPL-MCNC: 2.1 MG/DL (ref 1.8–2.4)
MCH RBC QN AUTO: 36 PG (ref 25–35)
MCHC RBC AUTO-ENTMCNC: 35 G/DL (ref 31–37)
MCV RBC AUTO: 103 FL (ref 79–100)
METHGB MFR BLD: 0.5 % (ref 0–1.9)
MONO #: 0.4 X10^3/UL (ref 0–1.1)
MONOCYTES NFR BLD: 4 % (ref 0–9)
NEUT #: 8.3 X10^3/UL (ref 1.8–7.7)
NEUTROPHILS NFR BLD AUTO: 83 % (ref 31–73)
OXYHGB MFR BLD: 87.1 %
PCO2 BLDA: 46 MMHG (ref 35–46)
PLATELET # BLD AUTO: 374 X10^3/UL (ref 140–400)
PO2 BLDA: 64 MMHG (ref 65–108)
POTASSIUM SERPL-SCNC: 4.3 MMOL/L (ref 3.5–5.1)
PROT SERPL-MCNC: 7.8 G/DL (ref 6.4–8.2)
RBC # BLD AUTO: 3.81 X10^6/UL (ref 3.5–5.4)
SAO2 % BLDA: 92 % (ref 92–99)
SODIUM SERPL-SCNC: 137 MMOL/L (ref 136–145)
WBC # BLD AUTO: 10.1 X10^3/UL (ref 4–11)

## 2021-09-14 PROCEDURE — 83735 ASSAY OF MAGNESIUM: CPT

## 2021-09-14 PROCEDURE — 83880 ASSAY OF NATRIURETIC PEPTIDE: CPT

## 2021-09-14 PROCEDURE — 93005 ELECTROCARDIOGRAM TRACING: CPT

## 2021-09-14 PROCEDURE — 71046 X-RAY EXAM CHEST 2 VIEWS: CPT

## 2021-09-14 PROCEDURE — 84145 PROCALCITONIN (PCT): CPT

## 2021-09-14 PROCEDURE — G0378 HOSPITAL OBSERVATION PER HR: HCPCS

## 2021-09-14 PROCEDURE — 74177 CT ABD & PELVIS W/CONTRAST: CPT

## 2021-09-14 PROCEDURE — 87449 NOS EACH ORGANISM AG IA: CPT

## 2021-09-14 PROCEDURE — U0003 INFECTIOUS AGENT DETECTION BY NUCLEIC ACID (DNA OR RNA); SEVERE ACUTE RESPIRATORY SYNDROME CORONAVIRUS 2 (SARS-COV-2) (CORONAVIRUS DISEASE [COVID-19]), AMPLIFIED PROBE TECHNIQUE, MAKING USE OF HIGH THROUGHPUT TECHNOLOGIES AS DESCRIBED BY CMS-2020-01-R: HCPCS

## 2021-09-14 PROCEDURE — 87426 SARSCOV CORONAVIRUS AG IA: CPT

## 2021-09-14 PROCEDURE — 85379 FIBRIN DEGRADATION QUANT: CPT

## 2021-09-14 PROCEDURE — 87040 BLOOD CULTURE FOR BACTERIA: CPT

## 2021-09-14 PROCEDURE — 36415 COLL VENOUS BLD VENIPUNCTURE: CPT

## 2021-09-14 PROCEDURE — 82805 BLOOD GASES W/O2 SATURATION: CPT

## 2021-09-14 PROCEDURE — 81001 URINALYSIS AUTO W/SCOPE: CPT

## 2021-09-14 PROCEDURE — 94640 AIRWAY INHALATION TREATMENT: CPT

## 2021-09-14 PROCEDURE — 84443 ASSAY THYROID STIM HORMONE: CPT

## 2021-09-14 PROCEDURE — 96375 TX/PRO/DX INJ NEW DRUG ADDON: CPT

## 2021-09-14 PROCEDURE — 71100 X-RAY EXAM RIBS UNI 2 VIEWS: CPT

## 2021-09-14 PROCEDURE — 80048 BASIC METABOLIC PNL TOTAL CA: CPT

## 2021-09-14 PROCEDURE — 80053 COMPREHEN METABOLIC PANEL: CPT

## 2021-09-14 PROCEDURE — 83605 ASSAY OF LACTIC ACID: CPT

## 2021-09-14 PROCEDURE — 84484 ASSAY OF TROPONIN QUANT: CPT

## 2021-09-14 PROCEDURE — 36600 WITHDRAWAL OF ARTERIAL BLOOD: CPT

## 2021-09-14 PROCEDURE — 85025 COMPLETE CBC W/AUTO DIFF WBC: CPT

## 2021-09-14 PROCEDURE — 96365 THER/PROPH/DIAG IV INF INIT: CPT

## 2021-09-14 PROCEDURE — 82607 VITAMIN B-12: CPT

## 2021-09-14 RX ADMIN — FENTANYL CITRATE PRN MCG: 50 INJECTION INTRAMUSCULAR; INTRAVENOUS at 17:29

## 2021-09-14 RX ADMIN — ENOXAPARIN SODIUM SCH MG: 40 INJECTION SUBCUTANEOUS at 21:00

## 2021-09-14 RX ADMIN — FENTANYL CITRATE PRN MCG: 50 INJECTION INTRAMUSCULAR; INTRAVENOUS at 20:22

## 2021-09-14 RX ADMIN — BACITRACIN SCH MLS/HR: 5000 INJECTION, POWDER, FOR SOLUTION INTRAMUSCULAR at 23:42

## 2021-09-14 RX ADMIN — BACITRACIN SCH MLS/HR: 5000 INJECTION, POWDER, FOR SOLUTION INTRAMUSCULAR at 17:56

## 2021-09-14 RX ADMIN — IPRATROPIUM BROMIDE AND ALBUTEROL SCH PUFF: 20; 100 SPRAY, METERED RESPIRATORY (INHALATION) at 20:26

## 2021-09-14 NOTE — RAD
EXAMINATION: CT abdomen and pelvis with IV contrast.



INDICATION:61 years, Female, diarrhea for 2 weeks.



TECHNIQUE: Axial CT images of the abdomen and pelvis were obtained. Coronal and sagittal reformatted 
performed.



COMPARISON:  4/17/2021.



Exposure: One or more of the following individualized dose reduction techniques were utilized for thi
s examination:  

1. Automated exposure control  

2. Adjustment of the mA and/or kV according to patient size  

3. Use of iterative reconstruction technique.



FINDINGS:



LOWER CHEST:

Patchy groundglass opacities in bibasilar lungs.   



ABDOMEN/PELVIS:

Hepatomegaly with diffuse steatosis, measures 21 cm in length. Heterogeneous enhancement of the left 
hepatic lobe and small area in the right posterior hepatic lobe, may relate to transient hepatic atte
nuation differences. No suspicious focal hepatic lesion. Focal fatty sparing adjacent to the gallblad
[place]. Calcified granulomas in the spleen. 



Redemonstrated sequelae of chronic pancreatitis with diffuse parenchymal atrophy, prominent pancreati
c duct and multiple calcifications. Peripancreatic fat stranding, slightly improving since prior exam
. Unchanged pancreatic head 5.3 x 4.2 cm known pseudocyst. No adrenal nodule. No hydronephrosis in ei
ther kidney. Unchanged 6 mm nonobstructing calculus in the lower pole right kidney. Unchanged simple 
appearing cysts in the upper pole left kidney with the largest measures 1.3 cm. Subcentimeter hypoden
sities in both renal cortices, too small to be characterize, most likely simple cysts as seen on prio
r MRI exam. Nonspecific bilateral perinephric fat stranding. 



No bowel obstruction or wall thickening. Normal appendix. Mild aortoiliac atherosclerotic calcificati
ons without narrowing or dilatation. Mesenteric arteries and portal vein are patent. No pneumoperiton
eum or ascites. No lymphadenopathy in the abdomen or pelvis by size criteria. Surgical clips in the p
haritha. Unremarkable urinary bladder and uterus.



MUSCULOSKELETAL: 

Postsurgical changes along the anterior abdominal wall with herniorrhaphy repair. Kyphoplasty changes
 at L1 vertebral body. No acute osseous process. Severe degenerative changes at L5-S1.



IMPRESSION:

1. No acute abnormality in abdomen or pelvis. 

2. Redemonstrated sequelae of chronic pancreatitis with essentially unchanged 5.3 cm known pancreatic
 head pseudocyst.

3. Slightly improving peripancreatic fat stranding since prior exam. Consider correlation with lipase
 to exclude acute pancreatitis.

4. Mild hepatomegaly with diffuse steatosis.

5. Patchy groundglass opacities in bibasilar lungs, concerning for pneumonia.

6. Other chronic/incidental findings, as described above.



Electronically signed by: Vaughn Melendez MD (9/14/2021 3:45 PM) [id number]

## 2021-09-14 NOTE — RAD
XR CHEST 2V, XR RIBS 2 VIEWS LT  



DATE: 9/14/2021 2:01 PM 



INDICATION:  cough, soa, rib pain / Spl. Instructions:  / History:   



COMPARISON: 4/16/2021.



FINDINGS:  



Chest: Heart size is within normal limits.  No focal consolidations are seen. Bilateral perihilar and
 basilar allergies opacities. Indistinct central vasculature.



Bones: No radiographic evidence for a displaced, left-sided rib fracture is seen.



IMPRESSION: 



1. Bilateral perihilar and basilar opacities, probably pulmonary edema or multifocal infection

2. No radiographic evidence for left-sided rib fracture.



Electronically signed by: Adalid Montana MD (9/14/2021 2:27 PM) [facility]

## 2021-09-14 NOTE — PDOC1
History and Physical


Date of Admission


Date of Admission


DATE: 9/14/21 


TIME: 16:44





Identification/Chief Complaint


Chief Complaint


SHORTNESS OF BREATH





History of Present Illness


History of Present Illness


61 year old female smoker  presented to ER today  for respiratory infection   

also had diarrhea for the last 2 weeks. c/o left upper chest pain and rib pain 

that does hurt with movement / hurts all the time.   No r evidence for left-

sided rib fracture. she is now more short of air than she usual  denies fever, 

abdominal pain, nausea, vomiting, back pain, urinary symptoms, dizziness, 

headache, focal weakness, numbness or tingling. PMH  history of hypertension, 

COPD, UTI, hypokalemia, cellulitis, chronic pancreatitis CT concerning for 

pneumonia  / Unchanged pancreatic head 5.3 x 4.2 cm known pseudocyst admits to 

have smoked x 50 yrs


Denies hx COVID Vaccines





Past Medical History


Past Medical History:  Cancer, Hypertension, Kidney Stone, Other


Additional Past Medical Histor:  chronic pain, CERVICAL CANCER, PANCREATIC 

PSEUDOCYST


Past Surgical History:  Cancer Surgery, Tubal ligation, Other


Additional Past Surgical Histo:  Ureteral Stent, Laparoscopy


Smoking Status:  Current Every Day Smoker


Alcohol Use:  None


Drug Use:  None








FHX COPD


Cardiovascular:  HTN


Pulmonary:  Bronchitis, COPD


Heme/Onc:  No pertinent hx


Musculoskeletal:   low back pain


Rheumatologic:  No pertinent hx


Renal/:  No pertinent hx


Endocrine:  Hypothyroidism


Dermatology:  No pertinent hx





Past Surgical History


Past Surgical History:  Hernia Repair, Other





Family History


Family History:  High Cholestrol, Hypertension, Family History Unknown





Social History


Smoke:  1 pack per day


ALCOHOL:  none


Drugs:  None





Current Problem List


Problem List


Problems


Medical Problems:


(1) COPD with acute exacerbation


Status: Acute  





(2) Person under investigation for COVID-19


Status: Acute  





(3) Pneumonia


Status: Acute  





(4) Pyelonephritis


Status: Acute  











Current Medications


Current Medications





Current Medications


Fentanyl Citrate (Fentanyl 2ml Vial) 50 mcg 1X  ONCE IVP  Last administered on 

9/14/21at 14:17;  Start 9/14/21 at 13:45;  Stop 9/14/21 at 13:46;  Status DC


Iohexol (Omnipaque 300 Mg/ml) 60 ml 1X  ONCE IV  Last administered on 9/14/21at 

15:13;  Start 9/14/21 at 15:00;  Stop 9/14/21 at 15:01;  Status DC


Dexamethasone Sodium Phosphate (Decadron) 10 mg 1X  ONCE IV  Last administered 

on 9/14/21at 15:58;  Start 9/14/21 at 15:00;  Stop 9/14/21 at 15:01;  Status DC


Albuterol/ Ipratropium (Duoneb) 3 ml 1X  ONCE NEB  Last administered on 9 /14/21at 15:19;  Start 9/14/21 at 15:00;  Stop 9/14/21 at 15:01;  Status DC


Info (CONTRAST GIVEN -- Rx MONITORING) 1 each PRN DAILY  PRN MC SEE COMMENTS;  

Start 9/14/21 at 15:00;  Stop 9/16/21 at 14:59


Ceftriaxone Sodium (Rocephin) 1 gm 1X  ONCE IVP ;  Start 9/14/21 at 16:15;  Stop

9/14/21 at 16:19;  Status DC


Doxycycline Hyclate 100 mg/ Dextrose 100 ml @  50 mls/hr 1X  ONCE IV ;  Start 

9/14/21 at 16:15;  Stop 9/14/21 at 18:14


Fentanyl Citrate (Fentanyl 2ml Vial) 50 mcg PRN Q2HRS  PRN IVP PAIN;  Start 

9/14/21 at 16:30;  Stop 9/15/21 at 16:29


Acetaminophen (Tylenol) 650 mg PRN Q4HRS  PRN PO FEVER > 100.3'F;  Start 9/14/21

at 16:30;  Stop 9/15/21 at 16:29


Albuterol/ Ipratropium (Duoneb) 3 ml RTQID NEB ;  Start 9/14/21 at 20:00;  Stop 

9/15/21 at 19:59





Active Scripts


Active


Klor-Con M20 (Potassium Chloride) 20 Meq Tab.er.prt 20 Meq PO DAILY 14 Days


Calcium Carbonate 200 Mg Tab.chew 500 Mg PO PRN Q3HRS PRN 14 Days


Pantoprazole Sodium  ** (Pantoprazole Sodium) 40 Mg Tablet.dr 40 Mg PO DAILYAC 

30 Days


Mag-Al Plus Xs Suspension (Mag Hydrox/Al Hydrox/Simeth) 30 Ml Oral.susp 30 Ml PO

PRN Q3HRS PRN 14 Days


Tylenol (Acetaminophen) 325 Mg Tablet 650 Mg PO PRN Q6HRS PRN 14 Days


Lidocaine PATCH  ** (Lidocaine) 1 Each Adh..patch 1 Each TP DAILY


     REMOVE AFTER 12 HOURS


Cyclobenzaprine Hcl 10 Mg Tablet 1 Tab PO TID PRN


Levothyroxine Sodium 150 Mcg Tablet 150 Mcg PO DAILY06 30 Days


Losartan Potassium 100 Mg Tablet 100 Mg PO DAILY 30 Days


Amlodipine Besylate 10 Mg Tablet 10 Mg PO DAILY 30 Days


Reported


Oxycodone Hcl Immed.release ** (Oxycodone Hcl) 5 Mg Tablet 30 Mg PO PRN Q4HRS 

PRN





Allergies


Allergies:  


Coded Allergies:  


     No Known Drug Allergies (Unverified , 10/8/16)





ROS


Review of System


14 pt ros otherwise neg


General:  YES: Fatigue; 


   No: Chills, Night Sweats, Malaise, Appetite, Other


PSYCHOLOGICAL ROS:  No: Anxiety, Behavioral Disorder, Concentration difficultie,

Decreased libido, Depression, Disorientation, Hallucinations, Hostility, 

Irritablity, Memory difficulties, Mood Swings, Obsessive thoughts, Physical 

abuse, Sexual abuse, Sleep disturbances, Suicidal ideation, Other


Eyes:  No Blurry vision, No Decreased vision, No Double vision, No Dry eyes, No 

Excessive tearing, No Eye Pain, No Itchy Eyes, No Loss of vision, No 

Photophobia, No Scotomata, No Uses contacts, No Uses glasses, No Other


HEENT:  No: Heacaches, Visual Changes, Hearing change, Nasal congestion, Nasal 

discharge, Oral lesions, Sinus pain, Sore Throat, Epistaxis, Sneezing, Snoring, 

Tinnitus, Vertigo, Vocal changes, Other


ALLERGY AND IMMUNOLOGY:  No: Hives, Insect Bite Sensitivity, Itchy/Watery Eyes, 

Nasal Congestion, Post Nasal Drip, Seasonal Allergies, Other


Hematological and Lymphatic:  No: Bleeding Problems, Blood Clots, Blood 

Transfusions, Brusing, Night Sweats, Pallor, Swollen Lymph Nodes, Other


ENDOCRINE:  No: Breast Changes, Galactorrhea, Hair Pattern Changes, Hot Flashes,

Malaise/lethargy, Mood Swings, Palpitations, Polydipsia/polyuria, Skin Changes, 

Temperature Intolerance, Unexpected Weight Changes, Other


Breast:  No New/Changing Breast Lumps, No Nipple changes, No Nipple discharge, 

No Other


Respiratory:  YES: Cough, Pleuritic Pain, Shortness of breath, SOB with 

excertion


Cardiovascular:  No Chest Pain, No Palpitations, No Orthopnea, No Paroxysmal 

Noc. Dyspnea, No Edema, No Lt Headedness, No Other


Gastrointestinal:  No Nausea, No Vomiting, No Abdominal Pain, No Diarrhea, No 

Constipation, No Melena, No Hematochezia, No Other


Genitourinary:  No Dysuria, No Frequency, No Incontinence, No Hematuria, No 

Retention, No Discharge, No Urgency, No Pain, No Flank Pain, No Other, No , No ,

No , No , No , No , No 


Musculoskeletal:  No Gait Disturbance, No Joint Pain, No Joint Stiffness, No 

Joint Swelling, No Muscle Pain, No Muscular Weakness, No Pain In:, No Swelling 

In:, No Other


Neurological:  No Behavorial Changes, No Bowel/Bladder ControlChng, No 

Confusion, No Dizziness, No Gait Disturbance, No Headaches, No Impaired 

Coord/balance, No Memory Loss, No Numbness/Tingling, No Seizures, No Speech 

Problems, No Tremors, No Visual Changes, No Weakness, No Other


Skin:  No Dry Skin, No Eczema, No Hair Changes, No Lumps, No Mole Changes, No 

Mottling, No Nail Changes, No Pruritus, No Rash, No Skin Lesion Changes, No 

Other, No Acne





Physical Exam


Physical Exam


onstitutional: well nourished, no acute distress, non-toxic appearance. []


HENT: Normocephalic, atraumatic, bilateral external ears normal, oropharynx 

moist, no oral exudates, nose normal. []


Eyes: PERRLA, EOMI, conjunctiva normal, no discharge. [] 


Neck: Normal range of motion, no tenderness, supple, no stridor. [] 


Cardiovascular:Heart rate regular rhythm, no murmur []


Lungs & Thorax:  slight wheezing and lower diminished to auscultation []


Abdomen: Bowel sounds normal, soft, no tenderness, no masses, no pulsatile 

masses. [] 


Skin: Warm, dry, no erythema, no rash. [] 


Back: No tenderness, no CVA tenderness. [] 


Extremities: No tenderness, no cyanosis, no clubbing, ROM intact, no edema. [] 


Neurologic: Alert and oriented X 3, normal motor function, normal sensory 

function, no focal deficits noted. []


Psychologic: Affect normal, judgment normal, mood normal. []


General:  Alert, Oriented X3, Cooperative


HEENT:  Atraumatic, PERRLA, EOMI, Mucous membr. moist/pink


Lungs:  Normal air movement


Heart:  RRR, no thrills, no gallops, no jug vein distention


Breasts:  Not examined


Abdomen:  Normal bowel sounds, Soft


Rectal Exam:  not examined


PELVIC:  Examination not indicated


Extremities:  No cyanosis


Skin:  No breakdown, No significant lesion


Neuro:  Normal speech, Cranial nerves 3-12 NL


Psych/Mental Status:  Mental status NL, Mood NL





Vitals


Vitals





Vital Signs








  Date Time  Temp Pulse Resp B/P (MAP) Pulse Ox O2 Delivery O2 Flow Rate FiO2


 


9/14/21 15:20     98 Room Air  


 


9/14/21 12:42 98.3 94 16 188/108 (134)    





 98.3       











Labs


Labs





Laboratory Tests








Test


 9/14/21


14:00 9/14/21


14:20 9/14/21


15:36


 


White Blood Count


 10.1 x10^3/uL


(4.0-11.0) 


 





 


Red Blood Count


 3.81 x10^6/uL


(3.50-5.40) 


 





 


Hemoglobin


 13.7 g/dL


(12.0-15.5) 


 





 


Hematocrit


 39.1 %


(36.0-47.0) 


 





 


Mean Corpuscular Volume


 103 fL


() 


 





 


Mean Corpuscular Hemoglobin 36 pg (25-35)   


 


Mean Corpuscular Hemoglobin


Concent 35 g/dL


(31-37) 


 





 


Red Cell Distribution Width


 14.6 %


(11.5-14.5) 


 





 


Platelet Count


 374 x10^3/uL


(140-400) 


 





 


Neutrophils (%) (Auto) 83 % (31-73)   


 


Lymphocytes (%) (Auto) 11 % (24-48)   


 


Monocytes (%) (Auto) 4 % (0-9)   


 


Eosinophils (%) (Auto) 2 % (0-3)   


 


Basophils (%) (Auto) 0 % (0-3)   


 


Neutrophils # (Auto)


 8.3 x10^3/uL


(1.8-7.7) 


 





 


Lymphocytes # (Auto)


 1.1 x10^3/uL


(1.0-4.8) 


 





 


Monocytes # (Auto)


 0.4 x10^3/uL


(0.0-1.1) 


 





 


Eosinophils # (Auto)


 0.1 x10^3/uL


(0.0-0.7) 


 





 


Basophils # (Auto)


 0.0 x10^3/uL


(0.0-0.2) 


 





 


D-Dimer (Bre)


 < 0.27


ug/mlFEU 


 





 


Sodium Level


 137 mmol/L


(136-145) 


 





 


Potassium Level


 4.3 mmol/L


(3.5-5.1) 


 





 


Chloride Level


 100 mmol/L


() 


 





 


Carbon Dioxide Level


 33 mmol/L


(21-32) 


 





 


Anion Gap 4 (6-14)   


 


Blood Urea Nitrogen


 15 mg/dL


(7-20) 


 





 


Creatinine


 1.1 mg/dL


(0.6-1.0) 


 





 


Estimated GFR


(Cockcroft-Gault) 50.2 


 


 





 


BUN/Creatinine Ratio 14 (6-20)   


 


Glucose Level


 135 mg/dL


(70-99) 


 





 


Lactic Acid Level


 1.5 mmol/L


(0.4-2.0) 


 





 


Calcium Level


 9.4 mg/dL


(8.5-10.1) 


 





 


Magnesium Level


 2.1 mg/dL


(1.8-2.4) 


 





 


Total Bilirubin


 0.2 mg/dL


(0.2-1.0) 


 





 


Aspartate Amino Transf


(AST/SGOT) 11 U/L (15-37) 


 


 





 


Alanine Aminotransferase


(ALT/SGPT) 45 U/L (14-59) 


 


 





 


Alkaline Phosphatase


 109 U/L


() 


 





 


Troponin I Quantitative


 < 0.017 ng/mL


(0.000-0.055) 


 





 


NT-Pro-B-Type Natriuretic


Peptide 108 pg/mL


(0-124) 


 





 


Total Protein


 7.8 g/dL


(6.4-8.2) 


 





 


Albumin


 3.7 g/dL


(3.4-5.0) 


 





 


Albumin/Globulin Ratio 0.9 (1.0-1.7)   


 


SARS-CoV-2 Antigen (Rapid)


 


 Negative


(NEGATIVE) 





 


O2 Saturation   92 % (92-99) 


 


Arterial Blood pH


 


 


 7.37


(7.35-7.45)


 


Arterial Blood pCO2 at


Patient Temp 


 


 46 mmHg


(35-46)


 


Arterial Blood pO2 at Patient


Temp 


 


 64 mmHg


()


 


Arterial Blood HCO3


 


 


 26 mmol/L


(21-28)


 


Arterial Blood Base Excess


 


 


 1 mmol/L


(-3-3)


 


Oxyhemoglobin   87.1 % 


 


Methemoglobin


 


 


 0.5 %


(0.0-1.9)


 


Carbon Monoxide, Quantitative


 


 


 4.8 %


(0.0-1.9)


 


FiO2   21 








Laboratory Tests








Test


 9/14/21


14:00 9/14/21


14:20 9/14/21


15:36


 


White Blood Count


 10.1 x10^3/uL


(4.0-11.0) 


 





 


Red Blood Count


 3.81 x10^6/uL


(3.50-5.40) 


 





 


Hemoglobin


 13.7 g/dL


(12.0-15.5) 


 





 


Hematocrit


 39.1 %


(36.0-47.0) 


 





 


Mean Corpuscular Volume


 103 fL


() 


 





 


Mean Corpuscular Hemoglobin 36 pg (25-35)   


 


Mean Corpuscular Hemoglobin


Concent 35 g/dL


(31-37) 


 





 


Red Cell Distribution Width


 14.6 %


(11.5-14.5) 


 





 


Platelet Count


 374 x10^3/uL


(140-400) 


 





 


Neutrophils (%) (Auto) 83 % (31-73)   


 


Lymphocytes (%) (Auto) 11 % (24-48)   


 


Monocytes (%) (Auto) 4 % (0-9)   


 


Eosinophils (%) (Auto) 2 % (0-3)   


 


Basophils (%) (Auto) 0 % (0-3)   


 


Neutrophils # (Auto)


 8.3 x10^3/uL


(1.8-7.7) 


 





 


Lymphocytes # (Auto)


 1.1 x10^3/uL


(1.0-4.8) 


 





 


Monocytes # (Auto)


 0.4 x10^3/uL


(0.0-1.1) 


 





 


Eosinophils # (Auto)


 0.1 x10^3/uL


(0.0-0.7) 


 





 


Basophils # (Auto)


 0.0 x10^3/uL


(0.0-0.2) 


 





 


D-Dimer (Bre)


 < 0.27


ug/mlFEU 


 





 


Sodium Level


 137 mmol/L


(136-145) 


 





 


Potassium Level


 4.3 mmol/L


(3.5-5.1) 


 





 


Chloride Level


 100 mmol/L


() 


 





 


Carbon Dioxide Level


 33 mmol/L


(21-32) 


 





 


Anion Gap 4 (6-14)   


 


Blood Urea Nitrogen


 15 mg/dL


(7-20) 


 





 


Creatinine


 1.1 mg/dL


(0.6-1.0) 


 





 


Estimated GFR


(Cockcroft-Gault) 50.2 


 


 





 


BUN/Creatinine Ratio 14 (6-20)   


 


Glucose Level


 135 mg/dL


(70-99) 


 





 


Lactic Acid Level


 1.5 mmol/L


(0.4-2.0) 


 





 


Calcium Level


 9.4 mg/dL


(8.5-10.1) 


 





 


Magnesium Level


 2.1 mg/dL


(1.8-2.4) 


 





 


Total Bilirubin


 0.2 mg/dL


(0.2-1.0) 


 





 


Aspartate Amino Transf


(AST/SGOT) 11 U/L (15-37) 


 


 





 


Alanine Aminotransferase


(ALT/SGPT) 45 U/L (14-59) 


 


 





 


Alkaline Phosphatase


 109 U/L


() 


 





 


Troponin I Quantitative


 < 0.017 ng/mL


(0.000-0.055) 


 





 


NT-Pro-B-Type Natriuretic


Peptide 108 pg/mL


(0-124) 


 





 


Total Protein


 7.8 g/dL


(6.4-8.2) 


 





 


Albumin


 3.7 g/dL


(3.4-5.0) 


 





 


Albumin/Globulin Ratio 0.9 (1.0-1.7)   


 


SARS-CoV-2 Antigen (Rapid)


 


 Negative


(NEGATIVE) 





 


O2 Saturation   92 % (92-99) 


 


Arterial Blood pH


 


 


 7.37


(7.35-7.45)


 


Arterial Blood pCO2 at


Patient Temp 


 


 46 mmHg


(35-46)


 


Arterial Blood pO2 at Patient


Temp 


 


 64 mmHg


()


 


Arterial Blood HCO3


 


 


 26 mmol/L


(21-28)


 


Arterial Blood Base Excess


 


 


 1 mmol/L


(-3-3)


 


Oxyhemoglobin   87.1 % 


 


Methemoglobin


 


 


 0.5 %


(0.0-1.9)


 


Carbon Monoxide, Quantitative


 


 


 4.8 %


(0.0-1.9)


 


FiO2   21 











Images


Images





XR CHEST 2V, XR RIBS 2 VIEWS LT  





DATE: 9/14/2021 2:01 PM 





INDICATION:  cough, soa, rib pain / Spl. Instructions:  / History:   





COMPARISON: 4/16/2021.





FINDINGS:  





Chest: Heart size is within normal limits.  No focal consolidations are seen. 

Bilateral perihilar and basilar allergies opacities. Indistinct central 

vasculature.





Bones: No radiographic evidence for a displaced, left-sided rib fracture is 

seen.





IMPRESSION: 





1. Bilateral perihilar and basilar opacities, probably pulmonary edema or 

multifocal infection


2. No radiographic evidence for left-sided rib fracture.





Electronically signed by: Nicole Montana MD (9/14/2021 2:27 PM) [facility]














DICTATED and SIGNED BY:     NICOLE MONTANA MD


DATE:     09/14/21 [id number]








SEX: F                    EXAM DT: 09/14/21         ACCESSION#: [id number]


STATUS: REG ER            ORD. PHYSICIAN: JOSEPHINE MAI


REASON: diarrhea for 2 weeks


PROCEDURE: CT ABD PELV W/ IV CONTRST ONLY





EXAMINATION: CT abdomen and pelvis with IV contrast.





INDICATION:61 years, Female, diarrhea for 2 weeks.





TECHNIQUE: Axial CT images of the abdomen and pelvis were obtained. Coronal and 

sagittal reformatted performed.





COMPARISON:  4/17/2021.





Exposure: One or more of the following individualized dose reduction techniques 

were utilized for this examination:  


1. Automated exposure control  


2. Adjustment of the mA and/or kV according to patient size  


3. Use of iterative reconstruction technique.





FINDINGS:





LOWER CHEST:


Patchy groundglass opacities in bibasilar lungs.   





ABDOMEN/PELVIS:


Hepatomegaly with diffuse steatosis, measures 21 cm in length. Heterogeneous 

enhancement of the left hepatic lobe and small area in the right posterior 

hepatic lobe, may relate to transient hepatic attenuation differences. No 

suspicious focal hepatic lesion. Focal fatty sparing adjacent to the 

gallbladder. Calcified granulomas in the spleen. 





Redemonstrated sequelae of chronic pancreatitis with diffuse parenchymal a

trophy, prominent pancreatic duct and multiple calcifications. Peripancreatic 

fat stranding, slightly improving since prior exam. Unchanged pancreatic head 

5.3 x 4.2 cm known pseudocyst. No adrenal nodule. No hydronephrosis in either 

kidney. Unchanged 6 mm nonobstructing calculus in the lower pole right kidney. 

Unchanged simple appearing cysts in the upper pole left kidney with the largest 

measures 1.3 cm. Subcentimeter hypodensities in both renal cortices, too small 

to be characterize, most likely simple cysts as seen on prior MRI exam. 

Nonspecific bilateral perinephric fat stranding. 





No bowel obstruction or wall thickening. Normal appendix. Mild aortoiliac 

atherosclerotic calcifications without narrowing or dilatation. Mesenteric 

arteries and portal vein are patent. No pneumoperitoneum or ascites. No 

lymphadenopathy in the abdomen or pelvis by size criteria. Surgical clips in the

 pelvis. Unremarkable urinary bladder and uterus.





MUSCULOSKELETAL: 


Postsurgical changes along the anterior abdominal wall with herniorrhaphy 

repair. Kyphoplasty changes at L1 vertebral body. No acute osseous process. 

Severe degenerative changes at L5-S1.





IMPRESSION:


1. No acute abnormality in abdomen or pelvis. 


2. Redemonstrated sequelae of chronic pancreatitis with essentially unchanged 

5.3 cm known pancreatic head pseudocyst.


3. Slightly improving peripancreatic fat stranding since prior exam. Consider 

correlation with lipase to exclude acute pancreatitis.


4. Mild hepatomegaly with diffuse steatosis.


5. Patchy groundglass opacities in bibasilar lungs, concerning for pneumonia.


6. Other chronic/incidental findings, as described above.





Electronically signed by: Shaista Melendez MD (9/14/2021 3:45 PM) [id number]














DICTATED and SIGNED BY:     SHAISTA MELENDEZ MD


DATE:     09/14/21 [id number]





VTE Prophylaxis Ordered


VTE Prophylaxis Devices:  Yes


VTE Pharmacological Prophylaxi:  Yes





Assessment/Plan


Assessment/Plan


MPRESSION:








1. Pleuritic chest wall pain with cough/  pulmonary edema VS  multifocal 

infection


2. chronic pancreatitis with essentially unchanged 5.3 cm known pancreatic head 

pseudocyst.


3. COPD


4. Mild hepatomegaly with diffuse steatosis.


5. Patchy groundglass opacities in bibasilar lungs, C/W  pneumonia.


6. Person under investigation for COVID-19


7. OBESITY 


8. COPD with acute exacerbation/ tobacco abuse disorder 














PLAN==================











ADMIT=================





Emperic iv antibiotics, doxy, rocephin


duonebs qid


dvt prophylaxis 


duonebs qid 


legionella urinary ag


pneumococcal urine ag 


covid 19 screen  pcr 


PULM CONSULT


encouraged smoking cessation











D/W ER 





Justifications for Admission


Other Justification


Acute pancreatitis











ALEJANDRA YIN MD          Sep 14, 2021 16:46

## 2021-09-14 NOTE — PHYS DOC
Past Medical History


Past Medical History:  Cancer, Hypertension, Kidney Stone, Other


Additional Past Medical Histor:  chronic pain, CERVICAL CANCER, PANCREATIC 

PSEUDOCYST


Past Surgical History:  Cancer Surgery, Tubal ligation, Other


Additional Past Surgical Histo:  Ureteral Stent, Laparoscopy


Smoking Status:  Current Every Day Smoker


Alcohol Use:  None


Drug Use:  None





General Adult


EDM:


Chief Complaint:  RIB PAIN





HPI:


HPI:





Patient is a 61 year old female who presents with has been on antibiotics for 

last 8 days for respiratory infection of which she does not know what it is and 

she does not remember what antibiotic she is on.  She states she still has a 

couple more days of it.  States she is also had diarrhea for the last 2 weeks.  

States she was never tested for Covid.  She states she is having left upper 

chest pain and rib pain that does hurt with movement but then she states that it

just hurts all the time.  She states she is now more short of air than she 

usually is.  She denies fever, abdominal pain, nausea, vomiting, back pain, 

urinary symptoms, dizziness, headache, focal weakness, numbness or tingling.  

She has a history of hypertension, COPD, UTI, hypokalemia, cellulitis, chronic 

pancreatitis, intractable back pain, drug-seeking behavior, cervical cancer, 

smoker, pancreatic pseudocyst, kidney stone with ureteral stent, tubal ligation.

 She is rating her pain a 9 out of 10 is a sharp pain.





Review of Systems:


Review of Systems:


Constitutional:   Denies fever or chills. []


Eyes:   Denies change in visual acuity. []


HENT:   Denies nasal congestion or sore throat. [] 


Respiratory:   + cough or denies shortness of breath. [] 


Cardiovascular:   + chest pain or denies edema. [] 


GI:   Denies abdominal pain, nausea, vomiting, bloody stools or +diarrhea. [] 


:  Denies dysuria. [] 


Musculoskeletal:   Denies back pain or joint pain. + Left rib [] 


Integument:   Denies rash. [] 


Neurologic:   Denies headache, focal weakness or sensory changes. [] 


Endocrine:   Denies polyuria or polydipsia. [] 


Lymphatic:  Denies swollen glands. [] 


Psychiatric:  Denies depression or anxiety. []





Heart Score:


C/O Chest Pain:  Yes


HEART Score for Chest Pain:  








HEART Score for Chest Pain Response (Comments) Value


 


History Slighlty/Non-Suspicious 0


 


ECG Nonspecific Repolarizatio 1


 


Age >45 - < 65 1


 


Risk Factors                            1 or 2 Risk Factors 1


 


Troponin < Normal Limit 0


 


Total  3








Risk Factors:


Risk Factors:  DM, Current or recent (<one month) smoker, HTN, HLP, family 

history of CAD, obesity.


Risk Scores:


Score 0 - 3:  2.5% MACE over next 6 weeks - Discharge Home


Score 4 - 6:  20.3% MACE over next 6 weeks - Admit for Clinical Observation


Score 7 - 10:  72.7% MACE over next 6 weeks - Early Invasive Strategies





Current Medications:





Current Medications








 Medications


  (Trade)  Dose


 Ordered  Sig/Kimberli  Start Time


 Stop Time Status Last Admin


Dose Admin


 


 Fentanyl Citrate


  (Fentanyl 2ml


 Vial)  50 mcg  1X  ONCE  21 13:45


 21 13:46 DC 21 14:17


50 MCG











Allergies:


Allergies:





Allergies








Coded Allergies Type Severity Reaction Last Updated Verified


 


  No Known Drug Allergies    10/8/16 No











Physical Exam:


PE:





Constitutional: Well developed, well nourished, no acute distress, non-toxic 

appearance. []


HENT: Normocephalic, atraumatic, bilateral external ears normal, oropharynx 

moist, no oral exudates, nose normal. []


Eyes: PERRLA, EOMI, conjunctiva normal, no discharge. [] 


Neck: Normal range of motion, no tenderness, supple, no stridor. [] 


Cardiovascular:Heart rate regular rhythm, no murmur []


Lungs & Thorax:  Bilateral upper breath sounds slight wheezing and lower 

diminished to auscultation []


Abdomen: Bowel sounds normal, soft, no tenderness, no masses, no pulsatile 

masses. [] 


Skin: Warm, dry, no erythema, no rash. [] 


Back: No tenderness, no CVA tenderness. [] 


Extremities: No tenderness, no cyanosis, no clubbing, ROM intact, no edema. [] 


Neurologic: Alert and oriented X 3, normal motor function, normal sensory 

function, no focal deficits noted. []


Psychologic: Affect normal, judgement normal, mood normal. []





Current Patient Data:


Vital Signs:





                                   Vital Signs








  Date Time  Temp Pulse Resp B/P (MAP) Pulse Ox O2 Delivery O2 Flow Rate FiO2


 


21 12:42 98.3 94 16 188/108 (134) 94 Room Air  





 98.3       











EKG:


EK and read by Dr. Anderson is sinus rhythm with a inverted T in V2.  No STEMI.





Radiology/Procedures:


Radiology/Procedures:


[]


Impression:


                            [id number] [place] [address]


                                 [phone]


                                        


                                 IMAGING REPORT





                                     Signed





PATIENT: PAMELA DU  ACCOUNT: [id number]     MRN#: [id number]


: 1960           LOCATION: ER              AGE: 61


SEX: F                    EXAM DT: 21         ACCESSION#: [id number]


STATUS: REG ER            ORD. PHYSICIAN: JOSEPHINE MAI


REASON: cough, soa, rib pain


PROCEDURE: CHEST PA & LATERAL





XR CHEST 2V, XR RIBS 2 VIEWS LT  





DATE: 2021 2:01 PM 





INDICATION:  cough, soa, rib pain / Spl. Instructions:  / History:   





COMPARISON: 2021.





FINDINGS:  





Chest: Heart size is within normal limits.  No focal consolidations are seen. 

Bilateral perihilar and basilar allergies opacities. Indistinct central 

vasculature.





Bones: No radiographic evidence for a displaced, left-sided rib fracture is 

seen.





IMPRESSION: 





1. Bilateral perihilar and basilar opacities, probably pulmonary edema or 

multifocal infection


2. No radiographic evidence for left-sided rib fracture.





Electronically signed by: Nicole Montana MD (2021 2:27 PM) [facility]














DICTATED and SIGNED BY:     NICOLE MONTANA MD


DATE:     21 [id number] 0





                            [id number] [place] [address]


                                 [phone]


                                        


                                 IMAGING REPORT





                                     Signed





PATIENT: PAMELA DU  ACCOUNT: [id number]     MRN#: [id number]


: 1960           LOCATION: ER              AGE: 61


SEX: F                    EXAM DT: 21         ACCESSION#: [id number]


STATUS: REG ER            ORD. PHYSICIAN: JOSEPHINE MAI


REASON: diarrhea for 2 weeks


PROCEDURE: CT ABD PELV W/ IV CONTRST ONLY





EXAMINATION: CT abdomen and pelvis with IV contrast.





INDICATION:61 years, Female, diarrhea for 2 weeks.





TECHNIQUE: Axial CT images of the abdomen and pelvis were obtained. Coronal and 

sagittal reformatted performed.





COMPARISON:  2021.





Exposure: One or more of the following individualized dose reduction techniques 

were utilized for this examination:  


1. Automated exposure control  


2. Adjustment of the mA and/or kV according to patient size  


3. Use of iterative reconstruction technique.





FINDINGS:





LOWER CHEST:


Patchy groundglass opacities in bibasilar lungs.   





ABDOMEN/PELVIS:


Hepatomegaly with diffuse steatosis, measures 21 cm in length. Heterogeneous 

enhancement of the left hepatic lobe and small area in the right posterior 

hepatic lobe, may relate to transient hepatic attenuation differences. No 

suspicious focal hepatic lesion. Focal fatty sparing adjacent to the gallblad

[place]. Calcified granulomas in the spleen. 





Redemonstrated sequelae of chronic pancreatitis with diffuse parenchymal 

atrophy, prominent pancreatic duct and multiple calcifications. Peripancreatic 

fat stranding, slightly improving since prior exam. Unchanged pancreatic head 

5.3 x 4.2 cm known pseudocyst. No adrenal nodule. No hydronephrosis in either 

kidney. Unchanged 6 mm nonobstructing calculus in the lower pole right kidney. 

Unchanged simple appearing cysts in the upper pole left kidney with the largest 

measures 1.3 cm. Subcentimeter hypodensities in both renal cortices, too small 

to be characterize, most likely simple cysts as seen on prior MRI exam. 

Nonspecific bilateral perinephric fat stranding. 





No bowel obstruction or wall thickening. Normal appendix. Mild aortoiliac 

atherosclerotic calcifications without narrowing or dilatation. Mesenteric 

arteries and portal vein are patent. No pneumoperitoneum or ascites. No 

lymphadenopathy in the abdomen or pelvis by size criteria. Surgical clips in the

 pelvis. Unremarkable urinary bladder and uterus.





MUSCULOSKELETAL: 


Postsurgical changes along the anterior abdominal wall with herniorrhaphy 

repair. Kyphoplasty changes at L1 vertebral body. No acute osseous process. 

Severe degenerative changes at L5-S1.





IMPRESSION:


1. No acute abnormality in abdomen or pelvis. 


2. Redemonstrated sequelae of chronic pancreatitis with essentially unchanged 

5.3 cm known pancreatic head pseudocyst.


3. Slightly improving peripancreatic fat stranding since prior exam. Consider 

correlation with lipase to exclude acute pancreatitis.


4. Mild hepatomegaly with diffuse steatosis.


5. Patchy groundglass opacities in bibasilar lungs, concerning for pneumonia.


6. Other chronic/incidental findings, as described above.





Electronically signed by: Shaitsa Melendez MD (2021 3:45 PM) [id number]














DICTATED and SIGNED BY:     SHAISTA MELENDEZ MD


DATE:     21 [id number] 0





Course & Med Decision Making:


Course & Med Decision Making


Pertinent Labs and Imaging studies reviewed. (See chart for details)





COVID-19 CRITERIA:    The patient was evaluated during the global COVID-19 

pandemic, and that diagnosis was suspected/considered upon their initial 

presentation.  Their evaluation, treatment and testing was consistent with 

current guidelines for patients who present with complaints or symptoms that may

 be related to COVID-19.





See HPI.  Alert and oriented 4.  Ambulatory steady gait.  Speaks in full clear 

sentences.  Upper lung lobes have some slight wheezing.  Lung lower lung lobes 

are diminished.  Skin pink warm and dry.  Chest x-ray shows multifocal 

pneumonia.





CT abdomen pelvis shows a chronic 6 mm kidney stone that is nonobstructing, new 

groundglass opacities concerning for pneumonia, bilateral perinephric stranding 

and many other chronic findings.  Patient is followed outpatient therapy with 

antibiotics.  I have given her doxycycline and Rocephin IV.  She has received a 

DuoNeb and dexamethasone.  Patient admitted for IV antibiotics.














[]





Dragon Disclaimer:


Dragon Disclaimer:


This electronic medical record was generated, in whole or in part, using a voice

 recognition dictation system.





COVID-19 Patient Risks:


Age 65 or older:  No


Sign of co-morbidity:  Yes


Exp to person + for COVID:  No


Exp to PUI:  No


Travel from affected area:  No


Lower respiratory symptoms:  Yes


Fever:  No


Other:  Yes (CHEST PAIN, DIARRHEA)





PPE Use:


Full PPE with N95 mask or PAPR:  Yes





Departure


Departure


Impression:  


   Primary Impression:  


   Pneumonia


   Qualified Codes:  J18.9 - Pneumonia, unspecified organism


   Additional Impressions:  


   Person under investigation for COVID-19


   Pyelonephritis


   COPD with acute exacerbation


Disposition:   ADMITTED AS INPATIENT


Admitting Physician:  HIMS


Condition:  STABLE


Referrals:  


ANDRES MICHELE MD (PCP)











JOSEPHINE MAI            Sep 14, 2021 14:28

## 2021-09-14 NOTE — EKG
[facility]

              [address] [place] [address]

Test Date:    2021               Test Time:    12:42:36

Pat Name:     PAMELA DU            Department:   

Patient ID:   [id number]           Room:          

Gender:       F                        Technician:   

:          1960               Requested By: JOSEPHINE MAI

Order Number: [id number]           Reading MD:     

                                 Measurements

Intervals                              Axis          

Rate:         81                       P:            22

UT:           152                      QRS:          1

QRSD:         82                       T:            62

QT:           372                                    

QTc:          433                                    

                           Interpretive Statements

SINUS RHYTHM

T ABNORMALITY IN ANTERIOR LEADS

ABNORMAL ECG

RI6.01

No previous ECG available for comparison

## 2021-09-14 NOTE — RAD
XR CHEST 2V, XR RIBS 2 VIEWS LT  



DATE: 9/14/2021 2:01 PM 



INDICATION:  cough, soa, rib pain / Spl. Instructions:  / History:   



COMPARISON: 4/16/2021.



FINDINGS:  



Chest: Heart size is within normal limits.  No focal consolidations are seen. Bilateral perihilar and
 basilar allergies opacities. Indistinct central vasculature.



Bones: No radiographic evidence for a displaced, left-sided rib fracture is seen.



IMPRESSION: 



1. Bilateral perihilar and basilar opacities, probably pulmonary edema or multifocal infection

2. No radiographic evidence for left-sided rib fracture.



Electronically signed by: Adalid Montana MD (9/14/2021 2:27 PM) [facility]

## 2021-09-15 VITALS — SYSTOLIC BLOOD PRESSURE: 151 MMHG | DIASTOLIC BLOOD PRESSURE: 80 MMHG

## 2021-09-15 VITALS — DIASTOLIC BLOOD PRESSURE: 83 MMHG | SYSTOLIC BLOOD PRESSURE: 140 MMHG

## 2021-09-15 VITALS — DIASTOLIC BLOOD PRESSURE: 70 MMHG | SYSTOLIC BLOOD PRESSURE: 131 MMHG

## 2021-09-15 VITALS — SYSTOLIC BLOOD PRESSURE: 138 MMHG | DIASTOLIC BLOOD PRESSURE: 69 MMHG

## 2021-09-15 VITALS — SYSTOLIC BLOOD PRESSURE: 130 MMHG | DIASTOLIC BLOOD PRESSURE: 72 MMHG

## 2021-09-15 VITALS — DIASTOLIC BLOOD PRESSURE: 71 MMHG | SYSTOLIC BLOOD PRESSURE: 130 MMHG

## 2021-09-15 LAB
ANION GAP SERPL CALC-SCNC: 5 MMOL/L (ref 6–14)
APTT PPP: YELLOW S
BACTERIA #/AREA URNS HPF: (no result) /HPF
BASOPHILS # BLD AUTO: 0 X10^3/UL (ref 0–0.2)
BASOPHILS NFR BLD: 0 % (ref 0–3)
BILIRUB UR QL STRIP: NEGATIVE
BUN SERPL-MCNC: 14 MG/DL (ref 7–20)
CALCIUM SERPL-MCNC: 8.6 MG/DL (ref 8.5–10.1)
CHLORIDE SERPL-SCNC: 100 MMOL/L (ref 98–107)
CO2 SERPL-SCNC: 31 MMOL/L (ref 21–32)
CREAT SERPL-MCNC: 0.9 MG/DL (ref 0.6–1)
EOSINOPHIL NFR BLD: 0 % (ref 0–3)
EOSINOPHIL NFR BLD: 0 X10^3/UL (ref 0–0.7)
ERYTHROCYTE [DISTWIDTH] IN BLOOD BY AUTOMATED COUNT: 14.9 % (ref 11.5–14.5)
FIBRINOGEN PPP-MCNC: CLEAR MG/DL
GFR SERPLBLD BASED ON 1.73 SQ M-ARVRAT: 63.2 ML/MIN
GLUCOSE SERPL-MCNC: 139 MG/DL (ref 70–99)
HCT VFR BLD CALC: 36.8 % (ref 36–47)
HGB BLD-MCNC: 12.6 G/DL (ref 12–15.5)
LYMPHOCYTES # BLD: 1.1 X10^3/UL (ref 1–4.8)
LYMPHOCYTES NFR BLD AUTO: 9 % (ref 24–48)
MCH RBC QN AUTO: 35 PG (ref 25–35)
MCHC RBC AUTO-ENTMCNC: 34 G/DL (ref 31–37)
MCV RBC AUTO: 103 FL (ref 79–100)
MONO #: 0.4 X10^3/UL (ref 0–1.1)
MONOCYTES NFR BLD: 3 % (ref 0–9)
NEUT #: 10.9 X10^3/UL (ref 1.8–7.7)
NEUTROPHILS NFR BLD AUTO: 88 % (ref 31–73)
NITRITE UR QL STRIP: NEGATIVE
PH UR STRIP: 6.5 [PH]
PLATELET # BLD AUTO: 370 X10^3/UL (ref 140–400)
POTASSIUM SERPL-SCNC: 4.6 MMOL/L (ref 3.5–5.1)
PROT UR STRIP-MCNC: NEGATIVE MG/DL
RBC # BLD AUTO: 3.57 X10^6/UL (ref 3.5–5.4)
RBC #/AREA URNS HPF: (no result) /HPF (ref 0–2)
SODIUM SERPL-SCNC: 136 MMOL/L (ref 136–145)
UROBILINOGEN UR-MCNC: 0.2 MG/DL
WBC # BLD AUTO: 12.4 X10^3/UL (ref 4–11)
WBC #/AREA URNS HPF: (no result) /HPF (ref 0–4)

## 2021-09-15 RX ADMIN — IPRATROPIUM BROMIDE AND ALBUTEROL SCH PUFF: 20; 100 SPRAY, METERED RESPIRATORY (INHALATION) at 04:54

## 2021-09-15 RX ADMIN — ENOXAPARIN SODIUM SCH MG: 40 INJECTION SUBCUTANEOUS at 21:00

## 2021-09-15 RX ADMIN — IPRATROPIUM BROMIDE AND ALBUTEROL SCH PUFF: 20; 100 SPRAY, METERED RESPIRATORY (INHALATION) at 08:01

## 2021-09-15 RX ADMIN — FENTANYL CITRATE PRN MCG: 50 INJECTION INTRAMUSCULAR; INTRAVENOUS at 01:56

## 2021-09-15 RX ADMIN — LEVOTHYROXINE SODIUM SCH MCG: 150 TABLET ORAL at 06:02

## 2021-09-15 RX ADMIN — LIDOCAINE SCH PATCH: 50 PATCH CUTANEOUS at 07:46

## 2021-09-15 RX ADMIN — Medication SCH CAP: at 13:00

## 2021-09-15 RX ADMIN — DOXYCYCLINE SCH MLS/HR: 100 INJECTION, POWDER, LYOPHILIZED, FOR SOLUTION INTRAVENOUS at 21:09

## 2021-09-15 RX ADMIN — IPRATROPIUM BROMIDE AND ALBUTEROL SCH PUFF: 20; 100 SPRAY, METERED RESPIRATORY (INHALATION) at 12:04

## 2021-09-15 RX ADMIN — MORPHINE SULFATE PRN MG: 2 INJECTION, SOLUTION INTRAMUSCULAR; INTRAVENOUS at 17:02

## 2021-09-15 RX ADMIN — IPRATROPIUM BROMIDE AND ALBUTEROL SCH PUFF: 20; 100 SPRAY, METERED RESPIRATORY (INHALATION) at 20:00

## 2021-09-15 RX ADMIN — NICOTINE SCH PATCH: 21 PATCH, EXTENDED RELEASE TOPICAL at 10:16

## 2021-09-15 RX ADMIN — CEFTRIAXONE SODIUM SCH GM: 2 INJECTION, POWDER, FOR SOLUTION INTRAMUSCULAR; INTRAVENOUS at 07:47

## 2021-09-15 RX ADMIN — FENTANYL CITRATE PRN MCG: 50 INJECTION INTRAMUSCULAR; INTRAVENOUS at 12:04

## 2021-09-15 RX ADMIN — BUDESONIDE SCH MG: 0.5 INHALANT RESPIRATORY (INHALATION) at 14:40

## 2021-09-15 RX ADMIN — IPRATROPIUM BROMIDE AND ALBUTEROL SCH PUFF: 20; 100 SPRAY, METERED RESPIRATORY (INHALATION) at 01:02

## 2021-09-15 RX ADMIN — PANTOPRAZOLE SODIUM SCH MG: 40 TABLET, DELAYED RELEASE ORAL at 07:48

## 2021-09-15 RX ADMIN — FENTANYL CITRATE PRN MCG: 50 INJECTION INTRAMUSCULAR; INTRAVENOUS at 07:47

## 2021-09-15 RX ADMIN — Medication SCH CAP: at 21:06

## 2021-09-15 RX ADMIN — BUDESONIDE SCH MG: 0.5 INHALANT RESPIRATORY (INHALATION) at 20:24

## 2021-09-15 RX ADMIN — IPRATROPIUM BROMIDE AND ALBUTEROL SCH PUFF: 20; 100 SPRAY, METERED RESPIRATORY (INHALATION) at 15:26

## 2021-09-15 RX ADMIN — POTASSIUM CHLORIDE SCH MEQ: 1500 TABLET, EXTENDED RELEASE ORAL at 07:47

## 2021-09-15 RX ADMIN — DOXYCYCLINE SCH MLS/HR: 100 INJECTION, POWDER, LYOPHILIZED, FOR SOLUTION INTRAVENOUS at 07:47

## 2021-09-15 RX ADMIN — MORPHINE SULFATE PRN MG: 2 INJECTION, SOLUTION INTRAMUSCULAR; INTRAVENOUS at 21:08

## 2021-09-15 RX ADMIN — BACITRACIN SCH MLS/HR: 5000 INJECTION, POWDER, FOR SOLUTION INTRAMUSCULAR at 12:10

## 2021-09-15 RX ADMIN — LOSARTAN POTASSIUM SCH MG: 50 TABLET ORAL at 07:48

## 2021-09-15 NOTE — CONS
DATE OF CONSULTATION: 09/15/2021

ATTENDING PHYSICIAN:  Dr. Escamilla.



REASON FOR CONSULTATION:  Pneumonia and respiratory failure.



HISTORY OF PRESENT ILLNESS:  The patient is a 61-year-old who has 45 years of 

tobacco use and still smokes 1 pack per day.  She was brought into the hospital 

with complaint of abdominal pain.  She also had a cough which has been 

productive of yellow sputum.  No chest pain.  No headaches.  She did have some 

abdominal pain and had some diarrhea.



The patient normally does not wear oxygen.  She has no hemoptysis.  No weight 

loss.



CT abdomen and pelvis was performed.  I reviewed the lower portion of the lungs 

which shows bilateral patchy ground glass infiltrates.  The patient also had 

evidence of chronic pancreatitis with known pancreatic pseudocyst.  I have been 

asked to see her for further evaluation.



PAST MEDICAL HISTORY:  Significant for history of hypertension, renal stone, 

history of cervical cancer, pancreatic pseudocyst, chronic pancreatitis, long 

history of tobaccoism for 45 years, likely COPD.



PAST SURGICAL HISTORY:  Cancer surgery, tubal ligation, ureteral stent.



SOCIAL HISTORY:  Smoker for 45 years, 1 pack per day, still smoking.



ALLERGIES:  None.



MEDICATIONS:  Reviewed as listed in the MRAD, including Rocephin.  Lovenox for 

DVT prophylaxis.



REVIEW OF SYSTEMS:  A 12-point review of systems obtained.  Pertinent positives 

discussed in my history of present illness, otherwise noncontributory.  All 

systems that were negative were reviewed as well.



PHYSICAL EXAMINATION:

VITAL SIGNS:  Vital signs were reviewed.  Pulse ox 96% on 2 liters, afebrile.

NECK:  Supple.

LUNGS:  With diminished breath sounds.

CARDIOVASCULAR:  With a regular rate.

ABDOMEN:  Soft, nontender, obese.

EXTREMITIES:  With no pitting edema.



LABORATORY DATA:  Labs are reviewed.  Her COVID is negative.  BUN and creatinine

normal.  TSH 4.7.  D-dimer less than 0.27.  White cell count 12.4, hemoglobin 

12.6.



IMPRESSION:

1.  Acute hypoxic respiratory failure secondary to acute exacerbation of chronic

obstructive pulmonary disease and interstitial pneumonitis.

2.  COVID-19 negative.  The patient is not vaccinated with COVID.

3.  45 years of tobaccoism, likely chronic obstructive pulmonary disease with 

ongoing tobaccoism.

4.  Underlying obesity.

5.  Normal D-dimer.



RECOMMENDATIONS:

1.  Continue present oxygen.  Keep saturation 92% and above.

2.  Smoking cessation counseling provided.

3.  Continue empiric antibiotic with doxycycline and Rocephin.

4.  Lovenox for DVT prophylaxis.

5.  Continue nebulizer treatment.

6.  PFTs as an outpatient.

7.  No need for steroids at present.

8.  We will follow along with you, likely hospitalization 24-48 hours.







CHRIS

DR: Sybil   DD: 09/15/2021 09:46

DT: 09/15/2021 09:54   TID: [id number]

## 2021-09-15 NOTE — NUR
SW following. Discussed with RN, pt from home, 2L (does not use oxygen at home), cardiac 
diet. COVID-19 negative. Pulmonology following. RN advised no SW needs at this time. SW will 
continue to follow.

## 2021-09-15 NOTE — PDOC
[facility] PROGRESS NOTE


Date of Service


DOS:


DATE: 9/15/21 


TIME: 08:18





Chief Complaint


Chief Complaint


A/P:


Pleuritic chest wall pain with cough/  pulmonary edema VS  multifocal infection


Chronic pancreatitis with essentially unchanged 5.3 cm known pancreatic head 

pseudocyst.


COPD with acute exacerbation - still an active smoker, counseled on cessation


Tobacco abuse disorder 


Mild hepatomegaly with diffuse steatosis.


Abnormal CXR and CT - Patchy groundglass opacities in bibasilar lungs, C/W  

pneumonia.


Person under investigation for COVID-19


OBESITY 


Macrocytosis





History of Present Illness


History of Present Illness


61 year old female smoker  presented to ER today  for respiratory infection   

also had diarrhea for the last 2 weeks. c/o left upper chest pain and rib pain 

that does hurt with movement / hurts all the time.   No r evidence for left-

sided rib fracture. she is now more short of air than she usual  denies fever, 

abdominal pain, nausea, vomiting, back pain, urinary symptoms, dizziness, 

headache, focal weakness, numbness or tingling. PMH  history of hypertension, 

COPD, UTI, hypokalemia, cellulitis, chronic pancreatitis CT concerning for pn

eumonia  / Unchanged pancreatic head 5.3 x 4.2 cm known pseudocyst admits to 

have smoked x 50 yrs


Denies hx COVID Vaccines, is willing to look into this.





Afebrile overnight. Still very short of breath, wheezing. Feeling pain in her 

back and left ribs, asking for IV pain medication instead of her home oxycodone,

counseled on the importance of taking PO and IV for breakthrough.





Vitals/I&O


Vitals/I&O:





                                   Vital Signs








  Date Time  Temp Pulse Resp B/P (MAP) Pulse Ox O2 Delivery O2 Flow Rate FiO2


 


9/15/21 07:48  65  140/83    


 


9/15/21 07:00 97.6  18  96   





 97.6       


 


9/14/21 20:20      Nasal Cannula 2.0 














                                    I & O   


 


 9/14/21 9/14/21 9/15/21





 15:00 23:00 07:00


 


Intake Total   240 ml


 


Balance   240 ml











Physical Exam


General:  Alert, Oriented X3, Cooperative


Lungs:  Clear


Abdomen:  Normal bowel sounds, Soft


Extremities:  No cyanosis


Skin:  No breakdown, No significant lesion





Labs


Labs:





Laboratory Tests








Test


 9/14/21


14:00 9/14/21


14:20 9/14/21


15:36 9/15/21


05:55


 


White Blood Count


 10.1 x10^3/uL


(4.0-11.0) 


 


 12.4 x10^3/uL


(4.0-11.0)


 


Red Blood Count


 3.81 x10^6/uL


(3.50-5.40) 


 


 3.57 x10^6/uL


(3.50-5.40)


 


Hemoglobin


 13.7 g/dL


(12.0-15.5) 


 


 12.6 g/dL


(12.0-15.5)


 


Hematocrit


 39.1 %


(36.0-47.0) 


 


 36.8 %


(36.0-47.0)


 


Mean Corpuscular Volume


 103 fL


() 


 


 103 fL


()


 


Mean Corpuscular Hemoglobin 36 pg (25-35)    35 pg (25-35) 


 


Mean Corpuscular Hemoglobin


Concent 35 g/dL


(31-37) 


 


 34 g/dL


(31-37)


 


Red Cell Distribution Width


 14.6 %


(11.5-14.5) 


 


 14.9 %


(11.5-14.5)


 


Platelet Count


 374 x10^3/uL


(140-400) 


 


 370 x10^3/uL


(140-400)


 


Neutrophils (%) (Auto) 83 % (31-73)    88 % (31-73) 


 


Lymphocytes (%) (Auto) 11 % (24-48)    9 % (24-48) 


 


Monocytes (%) (Auto) 4 % (0-9)    3 % (0-9) 


 


Eosinophils (%) (Auto) 2 % (0-3)    0 % (0-3) 


 


Basophils (%) (Auto) 0 % (0-3)    0 % (0-3) 


 


Neutrophils # (Auto)


 8.3 x10^3/uL


(1.8-7.7) 


 


 10.9 x10^3/uL


(1.8-7.7)


 


Lymphocytes # (Auto)


 1.1 x10^3/uL


(1.0-4.8) 


 


 1.1 x10^3/uL


(1.0-4.8)


 


Monocytes # (Auto)


 0.4 x10^3/uL


(0.0-1.1) 


 


 0.4 x10^3/uL


(0.0-1.1)


 


Eosinophils # (Auto)


 0.1 x10^3/uL


(0.0-0.7) 


 


 0.0 x10^3/uL


(0.0-0.7)


 


Basophils # (Auto)


 0.0 x10^3/uL


(0.0-0.2) 


 


 0.0 x10^3/uL


(0.0-0.2)


 


D-Dimer (Bre)


 < 0.27


ug/mlFEU 


 


 





 


Sodium Level


 137 mmol/L


(136-145) 


 


 136 mmol/L


(136-145)


 


Potassium Level


 4.3 mmol/L


(3.5-5.1) 


 


 4.6 mmol/L


(3.5-5.1)


 


Chloride Level


 100 mmol/L


() 


 


 100 mmol/L


()


 


Carbon Dioxide Level


 33 mmol/L


(21-32) 


 


 31 mmol/L


(21-32)


 


Anion Gap 4 (6-14)    5 (6-14) 


 


Blood Urea Nitrogen


 15 mg/dL


(7-20) 


 


 14 mg/dL


(7-20)


 


Creatinine


 1.1 mg/dL


(0.6-1.0) 


 


 0.9 mg/dL


(0.6-1.0)


 


Estimated GFR


(Cockcroft-Gault) 50.2 


 


 


 63.2 





 


BUN/Creatinine Ratio 14 (6-20)    


 


Glucose Level


 135 mg/dL


(70-99) 


 


 139 mg/dL


(70-99)


 


Lactic Acid Level


 1.5 mmol/L


(0.4-2.0) 


 


 





 


Calcium Level


 9.4 mg/dL


(8.5-10.1) 


 


 8.6 mg/dL


(8.5-10.1)


 


Magnesium Level


 2.1 mg/dL


(1.8-2.4) 


 


 





 


Total Bilirubin


 0.2 mg/dL


(0.2-1.0) 


 


 





 


Aspartate Amino Transf


(AST/SGOT) 11 U/L (15-37) 


 


 


 





 


Alanine Aminotransferase


(ALT/SGPT) 45 U/L (14-59) 


 


 


 





 


Alkaline Phosphatase


 109 U/L


() 


 


 





 


Troponin I Quantitative


 < 0.017 ng/mL


(0.000-0.055) 


 


 





 


NT-Pro-B-Type Natriuretic


Peptide 108 pg/mL


(0-124) 


 


 





 


Total Protein


 7.8 g/dL


(6.4-8.2) 


 


 





 


Albumin


 3.7 g/dL


(3.4-5.0) 


 


 





 


Albumin/Globulin Ratio 0.9 (1.0-1.7)    


 


Procalcitonin


 < 0.10 ng/mL


(0.00-0.10) 


 


 





 


SARS-CoV-2 Antigen (Rapid)


 


 Negative


(NEGATIVE) 


 





 


O2 Saturation   92 % (92-99)  


 


Arterial Blood pH


 


 


 7.37


(7.35-7.45) 





 


Arterial Blood pCO2 at


Patient Temp 


 


 46 mmHg


(35-46) 





 


Arterial Blood pO2 at Patient


Temp 


 


 64 mmHg


() 





 


Arterial Blood HCO3


 


 


 26 mmol/L


(21-28) 





 


Arterial Blood Base Excess


 


 


 1 mmol/L


(-3-3) 





 


Oxyhemoglobin   87.1 %  


 


Methemoglobin


 


 


 0.5 %


(0.0-1.9) 





 


Carbon Monoxide, Quantitative


 


 


 4.8 %


(0.0-1.9) 





 


FiO2   21  











Assessment and Plan


Assessmemt and Plan


Problems


Medical Problems:


(1) COPD with acute exacerbation


Status: Acute  





(2) Person under investigation for COVID-19


Status: Acute  





(3) Pneumonia


Status: Acute  





(4) Pyelonephritis


Status: Acute  











Comment


Review of Relevant


I have reviewed the following items raffi (where applicable) has been applied.


Medications:





Current Medications








 Medications


  (Trade)  Dose


 Ordered  Sig/Kimberli


 Route


 PRN Reason  Start Time


 Stop Time Status Last Admin


Dose Admin


 


 Fentanyl Citrate


  (Fentanyl 2ml


 Vial)  50 mcg  1X  ONCE


 IVP


   9/14/21 13:45


 9/14/21 13:46 DC 9/14/21 14:17





 


 Iohexol


  (Omnipaque 300


 Mg/ml)  60 ml  1X  ONCE


 IV


   9/14/21 15:00


 9/14/21 15:01 DC 9/14/21 15:13





 


 Dexamethasone


 Sodium Phosphate


  (Decadron)  10 mg  1X  ONCE


 IV


   9/14/21 15:00


 9/14/21 15:01 DC 9/14/21 15:58





 


 Albuterol/


 Ipratropium


  (Duoneb)  3 ml  1X  ONCE


 NEB


   9/14/21 15:00


 9/14/21 15:01 DC 9/14/21 15:19





 


 Ceftriaxone Sodium


  (Rocephin)  1 gm  1X  ONCE


 IVP


   9/14/21 16:15


 9/14/21 16:19 DC 9/14/21 17:33





 


 Doxycycline


 Hyclate 100 mg/


 Dextrose  100 ml @ 


 50 mls/hr  1X  ONCE


 IV


   9/14/21 16:15


 9/14/21 18:14 DC 9/14/21 18:00





 


 Fentanyl Citrate


  (Fentanyl 2ml


 Vial)  50 mcg  PRN Q2HRS  PRN


 IVP


 PAIN  9/14/21 16:30


 9/15/21 16:29  9/15/21 07:47





 


 Doxycycline


 Hyclate 100 mg/


 Dextrose  100 ml @ 


 50 mls/hr  BID


 IV


   9/15/21 09:00


    9/15/21 07:47





 


 Sodium Chloride  1,000 ml @ 


 75 mls/hr  [id number]


 IV


   9/14/21 17:00


    9/14/21 23:42





 


 Acetaminophen


  (Tylenol)  650 mg  PRN Q4HRS  PRN


 PO


 TEMP OVER 100.4F OR MILD PAIN  9/14/21 17:00


    9/14/21 20:20





 


 Amlodipine


 Besylate


  (Norvasc)  10 mg  DAILY


 PO


   9/15/21 09:00


    9/15/21 07:48





 


 Levothyroxine


 Sodium


  (Synthroid)  150 mcg  DAILY06


 PO


   9/15/21 06:00


    9/15/21 06:02





 


 Lidocaine


  (Lidoderm)  1 patch  DAILY


 TP


   9/15/21 09:00


    9/15/21 07:46





 


 Oxycodone HCl


  (Roxicodone)  30 mg  PRN Q4HRS  PRN


 PO


 MODERATE-SEVERE PAIN  9/14/21 17:15


    9/15/21 05:58





 


 Pantoprazole


 Sodium


  (Protonix)  40 mg  DAILYAC


 PO


   9/15/21 07:30


    9/15/21 07:48





 


 Potassium Chloride


  (Klor-Con)  20 meq  DAILY


 PO


   9/15/21 09:00


    9/15/21 07:47





 


 Losartan Potassium


  (Cozaar)  100 mg  DAILY


 PO


   9/15/21 09:00


    9/15/21 07:48





 


 Albuterol/


 Ipratropium


  (Combivent


 Respimat 


 Mcg)  1 puff  Q4HRS


 INH


   9/14/21 20:00


    9/15/21 08:01





 


 Ceftriaxone Sodium


  (Rocephin)  2 gm  DAILY08


 IVP


   9/15/21 08:00


    9/15/21 07:47














Justifications for Admission


Other Justification


Acute pancreatitis











VIKTORIA PAYNE MD        Sep 15, 2021 08:24

## 2021-09-16 VITALS — DIASTOLIC BLOOD PRESSURE: 85 MMHG | SYSTOLIC BLOOD PRESSURE: 154 MMHG

## 2021-09-16 RX ADMIN — LIDOCAINE SCH PATCH: 50 PATCH CUTANEOUS at 09:00

## 2021-09-16 RX ADMIN — DOXYCYCLINE SCH MLS/HR: 100 INJECTION, POWDER, LYOPHILIZED, FOR SOLUTION INTRAVENOUS at 08:56

## 2021-09-16 RX ADMIN — NICOTINE SCH PATCH: 21 PATCH, EXTENDED RELEASE TOPICAL at 08:55

## 2021-09-16 RX ADMIN — CEFTRIAXONE SODIUM SCH GM: 2 INJECTION, POWDER, FOR SOLUTION INTRAMUSCULAR; INTRAVENOUS at 08:56

## 2021-09-16 RX ADMIN — LOSARTAN POTASSIUM SCH MG: 50 TABLET ORAL at 08:55

## 2021-09-16 RX ADMIN — IPRATROPIUM BROMIDE AND ALBUTEROL SCH PUFF: 20; 100 SPRAY, METERED RESPIRATORY (INHALATION) at 00:00

## 2021-09-16 RX ADMIN — Medication SCH CAP: at 08:55

## 2021-09-16 RX ADMIN — IPRATROPIUM BROMIDE AND ALBUTEROL SCH PUFF: 20; 100 SPRAY, METERED RESPIRATORY (INHALATION) at 08:00

## 2021-09-16 RX ADMIN — PANTOPRAZOLE SODIUM SCH MG: 40 TABLET, DELAYED RELEASE ORAL at 08:55

## 2021-09-16 RX ADMIN — LEVOTHYROXINE SODIUM SCH MCG: 150 TABLET ORAL at 08:55

## 2021-09-16 RX ADMIN — POTASSIUM CHLORIDE SCH MEQ: 1500 TABLET, EXTENDED RELEASE ORAL at 08:55

## 2021-09-16 RX ADMIN — MORPHINE SULFATE PRN MG: 2 INJECTION, SOLUTION INTRAMUSCULAR; INTRAVENOUS at 08:56

## 2021-09-16 RX ADMIN — BUDESONIDE SCH MG: 0.5 INHALANT RESPIRATORY (INHALATION) at 08:04

## 2021-09-16 NOTE — PDOC3
Discharge Summary


Visit Information


Date of Admission:  Sep 14, 2021


Date of Discharge:  Sep 16, 2021


Admitting Diagnosis:  Pneumonia


Final Diagnosis


Problems


Medical Problems:


(1) COPD with acute exacerbation


Status: Acute  





(2) Person under investigation for COVID-19


Status: Acute  





(3) Pneumonia


Status: Acute  





(4) Pyelonephritis


Status: Acute  











Brief Hospital Course


Allergies





                                    Allergies








Coded Allergies Type Severity Reaction Last Updated Verified


 


  No Known Drug Allergies    10/8/16 No








Vital Signs





Vital Signs








  Date Time  Temp Pulse Resp B/P (MAP) Pulse Ox O2 Delivery O2 Flow Rate FiO2


 


9/16/21 08:55  68  154/85    


 


9/16/21 08:04     99 Room Air  


 


9/16/21 07:00 97.8  18    2.0 





 97.8       








Lab Results





Laboratory Tests








Test


 9/14/21


14:00 9/14/21


14:20 9/14/21


15:36 9/15/21


05:55


 


White Blood Count


 10.1 x10^3/uL


(4.0-11.0) 


 


 12.4 x10^3/uL


(4.0-11.0)


 


Red Blood Count


 3.81 x10^6/uL


(3.50-5.40) 


 


 3.57 x10^6/uL


(3.50-5.40)


 


Hemoglobin


 13.7 g/dL


(12.0-15.5) 


 


 12.6 g/dL


(12.0-15.5)


 


Hematocrit


 39.1 %


(36.0-47.0) 


 


 36.8 %


(36.0-47.0)


 


Mean Corpuscular Volume


 103 fL


() 


 


 103 fL


()


 


Mean Corpuscular Hemoglobin 36 pg (25-35)    35 pg (25-35) 


 


Mean Corpuscular Hemoglobin


Concent 35 g/dL


(31-37) 


 


 34 g/dL


(31-37)


 


Red Cell Distribution Width


 14.6 %


(11.5-14.5) 


 


 14.9 %


(11.5-14.5)


 


Platelet Count


 374 x10^3/uL


(140-400) 


 


 370 x10^3/uL


(140-400)


 


Neutrophils (%) (Auto) 83 % (31-73)    88 % (31-73) 


 


Lymphocytes (%) (Auto) 11 % (24-48)    9 % (24-48) 


 


Monocytes (%) (Auto) 4 % (0-9)    3 % (0-9) 


 


Eosinophils (%) (Auto) 2 % (0-3)    0 % (0-3) 


 


Basophils (%) (Auto) 0 % (0-3)    0 % (0-3) 


 


Neutrophils # (Auto)


 8.3 x10^3/uL


(1.8-7.7) 


 


 10.9 x10^3/uL


(1.8-7.7)


 


Lymphocytes # (Auto)


 1.1 x10^3/uL


(1.0-4.8) 


 


 1.1 x10^3/uL


(1.0-4.8)


 


Monocytes # (Auto)


 0.4 x10^3/uL


(0.0-1.1) 


 


 0.4 x10^3/uL


(0.0-1.1)


 


Eosinophils # (Auto)


 0.1 x10^3/uL


(0.0-0.7) 


 


 0.0 x10^3/uL


(0.0-0.7)


 


Basophils # (Auto)


 0.0 x10^3/uL


(0.0-0.2) 


 


 0.0 x10^3/uL


(0.0-0.2)


 


D-Dimer (Bre)


 < 0.27


ug/mlFEU 


 


 





 


Sodium Level


 137 mmol/L


(136-145) 


 


 136 mmol/L


(136-145)


 


Potassium Level


 4.3 mmol/L


(3.5-5.1) 


 


 4.6 mmol/L


(3.5-5.1)


 


Chloride Level


 100 mmol/L


() 


 


 100 mmol/L


()


 


Carbon Dioxide Level


 33 mmol/L


(21-32) 


 


 31 mmol/L


(21-32)


 


Anion Gap 4 (6-14)    5 (6-14) 


 


Blood Urea Nitrogen


 15 mg/dL


(7-20) 


 


 14 mg/dL


(7-20)


 


Creatinine


 1.1 mg/dL


(0.6-1.0) 


 


 0.9 mg/dL


(0.6-1.0)


 


Estimated GFR


(Cockcroft-Gault) 50.2 


 


 


 63.2 





 


BUN/Creatinine Ratio 14 (6-20)    


 


Glucose Level


 135 mg/dL


(70-99) 


 


 139 mg/dL


(70-99)


 


Lactic Acid Level


 1.5 mmol/L


(0.4-2.0) 


 


 





 


Calcium Level


 9.4 mg/dL


(8.5-10.1) 


 


 8.6 mg/dL


(8.5-10.1)


 


Magnesium Level


 2.1 mg/dL


(1.8-2.4) 


 


 





 


Total Bilirubin


 0.2 mg/dL


(0.2-1.0) 


 


 





 


Aspartate Amino Transf


(AST/SGOT) 11 U/L (15-37) 


 


 


 





 


Alanine Aminotransferase


(ALT/SGPT) 45 U/L (14-59) 


 


 


 





 


Alkaline Phosphatase


 109 U/L


() 


 


 





 


Troponin I Quantitative


 < 0.017 ng/mL


(0.000-0.055) 


 


 





 


NT-Pro-B-Type Natriuretic


Peptide 108 pg/mL


(0-124) 


 


 





 


Total Protein


 7.8 g/dL


(6.4-8.2) 


 


 





 


Albumin


 3.7 g/dL


(3.4-5.0) 


 


 





 


Albumin/Globulin Ratio 0.9 (1.0-1.7)    


 


Procalcitonin


 < 0.10 ng/mL


(0.00-0.10) 


 


 





 


SARS-CoV-2 RNA (NORA)


 


 Negative


(Negative) 


 





 


SARS-CoV-2 Antigen (Rapid)


 


 Negative


(NEGATIVE) 


 





 


O2 Saturation   92 % (92-99)  


 


Arterial Blood pH


 


 


 7.37


(7.35-7.45) 





 


Arterial Blood pCO2 at


Patient Temp 


 


 46 mmHg


(35-46) 





 


Arterial Blood pO2 at Patient


Temp 


 


 64 mmHg


() 





 


Arterial Blood HCO3


 


 


 26 mmol/L


(21-28) 





 


Arterial Blood Base Excess


 


 


 1 mmol/L


(-3-3) 





 


Oxyhemoglobin   87.1 %  


 


Methemoglobin


 


 


 0.5 %


(0.0-1.9) 





 


Carbon Monoxide, Quantitative


 


 


 4.8 %


(0.0-1.9) 





 


FiO2   21  


 


Vitamin B12 Level


 


 


 


 326 pg/mL


(247-911)


 


Thyroid Stimulating Hormone


(TSH) 


 


 


 4.724 uIU/mL


(0.358-3.74)


 


Test


 9/15/21


13:52 


 


 





 


Urine Collection Type Unknown    


 


Urine Color Yellow    


 


Urine Clarity Clear    


 


Urine pH 6.5 (<5.0-8.0)    


 


Urine Specific Gravity


 1.015


(1.000-1.030) 


 


 





 


Urine Protein


 Negative mg/dL


(NEG-TRACE) 


 


 





 


Urine Glucose (UA)


 Negative mg/dL


(NEG) 


 


 





 


Urine Ketones (Stick)


 Negative mg/dL


(NEG) 


 


 





 


Urine Blood Negative (NEG)    


 


Urine Nitrite Negative (NEG)    


 


Urine Bilirubin Negative (NEG)    


 


Urine Urobilinogen Dipstick


 0.2 mg/dL (0.2


mg/dL) 


 


 





 


Urine Leukocyte Esterase Negative (NEG)    


 


Urine RBC Occ /HPF (0-2)    


 


Urine WBC Occ /HPF (0-4)    


 


Urine Squamous Epithelial


Cells Many /LPF 


 


 


 





 


Urine Bacteria


 Few /HPF


(0-FEW) 


 


 











Laboratory Tests








Test


 9/15/21


13:52


 


Urine Collection Type Unknown 


 


Urine Color Yellow 


 


Urine Clarity Clear 


 


Urine pH 6.5 (<5.0-8.0) 


 


Urine Specific Gravity


 1.015


(1.000-1.030)


 


Urine Protein


 Negative mg/dL


(NEG-TRACE)


 


Urine Glucose (UA)


 Negative mg/dL


(NEG)


 


Urine Ketones (Stick)


 Negative mg/dL


(NEG)


 


Urine Blood Negative (NEG) 


 


Urine Nitrite Negative (NEG) 


 


Urine Bilirubin Negative (NEG) 


 


Urine Urobilinogen Dipstick


 0.2 mg/dL (0.2


mg/dL)


 


Urine Leukocyte Esterase Negative (NEG) 


 


Urine RBC Occ /HPF (0-2) 


 


Urine WBC Occ /HPF (0-4) 


 


Urine Squamous Epithelial


Cells Many /LPF 





 


Urine Bacteria


 Few /HPF


(0-FEW)








Brief Hospital Course


Ms Chand is a 61 year old female smoker  presented to ER today  for respiratory

infection   also had diarrhea for the last 2 weeks. c/o left upper chest pain 

and rib pain that does hurt with movement / hurts all the time.   No r evidence 

for left-sided rib fracture. she is now more short of air than she usual  denies

fever, abdominal pain, nausea, vomiting, back pain, urinary symptoms, dizziness,

headache, focal weakness, numbness or tingling. PMH  history of hypertension, 

COPD, UTI, hypokalemia, cellulitis, chronic pancreatitis CT concerning for 

pneumonia  / Unchanged pancreatic head 5.3 x 4.2 cm known pseudocyst admits to 

have smoked x 50 yrs


Denies hx COVID Vaccines, is willing to look into this.





9/15: Afebrile overnight. Still very short of breath, wheezing, requiring 2L 

NCO2 to maintain saturations 92%. Feeling pain in her back and left ribs, asking

for IV pain medication instead of her home oxycodone, counseled on the 

importance of taking PO and IV for breakthrough.





Afebrile overnight.  She is requesting for left still with cough and wheezing.  

She has become upset after discussion about IV pain medication threatened to 

leave AMA.  Have advised her she needs additional treatment for pneumonia and 

respiratory treatments.  Regardless she still left AGAINST MEDICAL ADVICE





Consults: Pulmonology





Problem list:


Community acquired pneumonia - likely gram negative given smoking history and 

structural lung disease.


Chronic pancreatitis with essentially unchanged 5.3 cm known pancreatic head pse

udocyst.


COPD with acute exacerbation - still an active smoker, counseled on cessation


Tobacco abuse disorder 


Mild hepatomegaly with diffuse steatosis.


Abnormal CXR and CT - Patchy groundglass opacities in bibasilar lungs, C/W  

pneumonia.


Person under investigation for COVID-19


OBESITY 


Macrocytosis





Greater than 30 minutes spent on d/c





Discharge Information


Condition at Discharge:  Comment (AMA)


Disposition/Orders:  Other (AMA)


Scheduled


Amlodipine Besylate (Amlodipine Besylate) 10 Mg Tablet, 10 MG PO DAILY for 30 

Days, #30


   Prescribed by: RONNIE BELL on 9/17/17 1202


   Last Taken: Unknown Dose on Unknown Date & Time     Last Action: Last Taken 

Edited on 9/14/21 2308 by NITO GARCIA RN


Doxycycline Hyclate (Doxycycline Hyclate) 100 Mg Capsule, 1 CAP PO BID for 

Pneumonia for 5 Days, #10


   Prescribed by: VIKTORIA PAYNE MD on 9/16/21 1137


Levothyroxine Sodium (Levothyroxine Sodium) 150 Mcg Tablet, 150 MCG PO DAILY06 

for 30 Days, #30 Ref 5


   Prescribed by: NOY MICHELE MD on 9/18/17 1744


   Last Action: Continued on 9/14/21 1711 by ALEJANDRA YIN MD


Lidocaine (Lidocaine PATCH  **) 1 Each Adh..patch, 1 EACH TP DAILY for FOR LOCAL

PAIN, #10


   REMOVE AFTER 12 HOURS 


   Prescribed by: HEATHER PAUL D.O. on 9/2/19 1912


   Last Action: Continued on 9/14/21 1711 by ALEJANDRA YIN MD


Losartan Potassium (Losartan Potassium) 100 Mg Tablet, 100 MG PO DAILY for 30 

Days, #30


   Prescribed by: RONNIE BELL on 9/17/17 1202


   Last Taken: Unknown Dose on Unknown Date & Time     Last Action: Last Taken 

Edited on 9/14/21 2308 by NITO GARCIA RN


Pantoprazole Sodium (Pantoprazole Sodium  **) 40 Mg Tablet.dr, 40 MG PO DAILYAC 

for GERD for 30 Days, #30


   Prescribed by: ALEJANDRA YIN MD on 1/26/21 1551


   Last Action: Last Taken Edited on 9/14/21 2308 by NITO GARCIA RN


Potassium Chloride (Klor-Con M20) 20 Meq Tab.er.prt, 20 MEQ PO DAILY for SUP

PLEMENT for 14 Days, #14


   Prescribed by: ALEJANDRA YIN MD on 4/19/21 1102


   Last Action: Last Taken Edited on 9/14/21 2308 by NITO GARCIA RN





Scheduled PRN


Acetaminophen (Tylenol) 325 Mg Tablet, 650 MG PO PRN Q6HRS PRN for Headaches, 

Temp > 101.5F for 14 Days, #90


   Prescribed by: ALEJANDRA YIN MD on 1/26/21 1551


   Last Action: Last Taken Edited on 9/14/21 2308 by NITO GARCIA RN


Calcium Carbonate (Calcium Carbonate) 200 Mg Tab.chew, 500 MG PO PRN Q3HRS PRN 

for UPSET STOMACH for 14 Days, #60


   Prescribed by: ALEJANDRA YIN MD on 4/19/21 1101


   Last Action: Last Taken Edited on 9/14/21 2308 by NITO GARCIA RN


Cyclobenzaprine Hcl (Cyclobenzaprine Hcl) 10 Mg Tablet, 1 TAB PO TID PRN for 

PAIN, #30


   Prescribed by: ROSA MARIA TIRADO MD on 6/28/18 1906


   Last Action: HELD on 9/14/21 1711 by ALEJANDRA YIN MD


Mag Hydrox/Al Hydrox/Simeth (Mag-Al Plus Xs Suspension) 30 Ml Oral.susp, 30 ML 

PO PRN Q3HRS PRN for HEARTBURN / GAS for 14 Days, #240


   Prescribed by: ALEJANDRA YIN MD on 1/26/21 1551


   Last Action: Last Taken Edited on 9/14/21 2308 by NITO GARCIA RN


Oxycodone Hcl (Oxycodone Hcl Immed.release **) 5 Mg Tablet, 30 MG PO PRN Q4HRS 

PRN for PAIN, (Reported)


   Entered as Reported by: ANGELIA QUEZADA on 2/16/14 1626


   Last Taken: Unknown Dose on 9/13/21      Last Action: Last Taken Edited on 9 /14/21 2308 by NITO GARCIA RN





Justicifation of Admission Dx:


Justifications for Admission:


Justification of Admission Dx:  Yes











VIKTORIA PAYNE MD        Sep 16, 2021 11:41

## 2021-09-16 NOTE — PDOC
PULMONARY PROGRESS NOTES


DATE: 9/16/21 


TIME: 09:27


Subjective


Complaint of coughing.


Denies shortness of breath


Vitals





Vital Signs








  Date Time  Temp Pulse Resp B/P (MAP) Pulse Ox O2 Delivery O2 Flow Rate FiO2


 


9/16/21 08:55  68  154/85    


 


9/16/21 08:04     99 Room Air  


 


9/16/21 07:00 97.8  18    2.0 





 97.8       








General:  Alert, No acute distress


Lungs:  Clear


Cardiovascular:  S1


Abdomen:  Soft, Other


Neuro Exam:  Alert


Extremities:  No Edema (Obese)


Skin:  Warm


Labs





Laboratory Tests








Test


 9/14/21


14:00 9/14/21


14:20 9/14/21


15:36 9/15/21


05:55


 


White Blood Count


 10.1 x10^3/uL


(4.0-11.0) 


 


 12.4 x10^3/uL


(4.0-11.0)


 


Red Blood Count


 3.81 x10^6/uL


(3.50-5.40) 


 


 3.57 x10^6/uL


(3.50-5.40)


 


Hemoglobin


 13.7 g/dL


(12.0-15.5) 


 


 12.6 g/dL


(12.0-15.5)


 


Hematocrit


 39.1 %


(36.0-47.0) 


 


 36.8 %


(36.0-47.0)


 


Mean Corpuscular Volume


 103 fL


() 


 


 103 fL


()


 


Mean Corpuscular Hemoglobin 36 pg (25-35)    35 pg (25-35) 


 


Mean Corpuscular Hemoglobin


Concent 35 g/dL


(31-37) 


 


 34 g/dL


(31-37)


 


Red Cell Distribution Width


 14.6 %


(11.5-14.5) 


 


 14.9 %


(11.5-14.5)


 


Platelet Count


 374 x10^3/uL


(140-400) 


 


 370 x10^3/uL


(140-400)


 


Neutrophils (%) (Auto) 83 % (31-73)    88 % (31-73) 


 


Lymphocytes (%) (Auto) 11 % (24-48)    9 % (24-48) 


 


Monocytes (%) (Auto) 4 % (0-9)    3 % (0-9) 


 


Eosinophils (%) (Auto) 2 % (0-3)    0 % (0-3) 


 


Basophils (%) (Auto) 0 % (0-3)    0 % (0-3) 


 


Neutrophils # (Auto)


 8.3 x10^3/uL


(1.8-7.7) 


 


 10.9 x10^3/uL


(1.8-7.7)


 


Lymphocytes # (Auto)


 1.1 x10^3/uL


(1.0-4.8) 


 


 1.1 x10^3/uL


(1.0-4.8)


 


Monocytes # (Auto)


 0.4 x10^3/uL


(0.0-1.1) 


 


 0.4 x10^3/uL


(0.0-1.1)


 


Eosinophils # (Auto)


 0.1 x10^3/uL


(0.0-0.7) 


 


 0.0 x10^3/uL


(0.0-0.7)


 


Basophils # (Auto)


 0.0 x10^3/uL


(0.0-0.2) 


 


 0.0 x10^3/uL


(0.0-0.2)


 


D-Dimer (Bre)


 < 0.27


ug/mlFEU 


 


 





 


Sodium Level


 137 mmol/L


(136-145) 


 


 136 mmol/L


(136-145)


 


Potassium Level


 4.3 mmol/L


(3.5-5.1) 


 


 4.6 mmol/L


(3.5-5.1)


 


Chloride Level


 100 mmol/L


() 


 


 100 mmol/L


()


 


Carbon Dioxide Level


 33 mmol/L


(21-32) 


 


 31 mmol/L


(21-32)


 


Anion Gap 4 (6-14)    5 (6-14) 


 


Blood Urea Nitrogen


 15 mg/dL


(7-20) 


 


 14 mg/dL


(7-20)


 


Creatinine


 1.1 mg/dL


(0.6-1.0) 


 


 0.9 mg/dL


(0.6-1.0)


 


Estimated GFR


(Cockcroft-Gault) 50.2 


 


 


 63.2 





 


BUN/Creatinine Ratio 14 (6-20)    


 


Glucose Level


 135 mg/dL


(70-99) 


 


 139 mg/dL


(70-99)


 


Lactic Acid Level


 1.5 mmol/L


(0.4-2.0) 


 


 





 


Calcium Level


 9.4 mg/dL


(8.5-10.1) 


 


 8.6 mg/dL


(8.5-10.1)


 


Magnesium Level


 2.1 mg/dL


(1.8-2.4) 


 


 





 


Total Bilirubin


 0.2 mg/dL


(0.2-1.0) 


 


 





 


Aspartate Amino Transf


(AST/SGOT) 11 U/L (15-37) 


 


 


 





 


Alanine Aminotransferase


(ALT/SGPT) 45 U/L (14-59) 


 


 


 





 


Alkaline Phosphatase


 109 U/L


() 


 


 





 


Troponin I Quantitative


 < 0.017 ng/mL


(0.000-0.055) 


 


 





 


NT-Pro-B-Type Natriuretic


Peptide 108 pg/mL


(0-124) 


 


 





 


Total Protein


 7.8 g/dL


(6.4-8.2) 


 


 





 


Albumin


 3.7 g/dL


(3.4-5.0) 


 


 





 


Albumin/Globulin Ratio 0.9 (1.0-1.7)    


 


Procalcitonin


 < 0.10 ng/mL


(0.00-0.10) 


 


 





 


SARS-CoV-2 RNA (NORA)


 


 Negative


(Negative) 


 





 


SARS-CoV-2 Antigen (Rapid)


 


 Negative


(NEGATIVE) 


 





 


O2 Saturation   92 % (92-99)  


 


Arterial Blood pH


 


 


 7.37


(7.35-7.45) 





 


Arterial Blood pCO2 at


Patient Temp 


 


 46 mmHg


(35-46) 





 


Arterial Blood pO2 at Patient


Temp 


 


 64 mmHg


() 





 


Arterial Blood HCO3


 


 


 26 mmol/L


(21-28) 





 


Arterial Blood Base Excess


 


 


 1 mmol/L


(-3-3) 





 


Oxyhemoglobin   87.1 %  


 


Methemoglobin


 


 


 0.5 %


(0.0-1.9) 





 


Carbon Monoxide, Quantitative


 


 


 4.8 %


(0.0-1.9) 





 


FiO2   21  


 


Vitamin B12 Level


 


 


 


 326 pg/mL


(247-911)


 


Thyroid Stimulating Hormone


(TSH) 


 


 


 4.724 uIU/mL


(0.358-3.74)


 


Test


 9/15/21


13:52 


 


 





 


Urine Collection Type Unknown    


 


Urine Color Yellow    


 


Urine Clarity Clear    


 


Urine pH 6.5 (<5.0-8.0)    


 


Urine Specific Gravity


 1.015


(1.000-1.030) 


 


 





 


Urine Protein


 Negative mg/dL


(NEG-TRACE) 


 


 





 


Urine Glucose (UA)


 Negative mg/dL


(NEG) 


 


 





 


Urine Ketones (Stick)


 Negative mg/dL


(NEG) 


 


 





 


Urine Blood Negative (NEG)    


 


Urine Nitrite Negative (NEG)    


 


Urine Bilirubin Negative (NEG)    


 


Urine Urobilinogen Dipstick


 0.2 mg/dL (0.2


mg/dL) 


 


 





 


Urine Leukocyte Esterase Negative (NEG)    


 


Urine RBC Occ /HPF (0-2)    


 


Urine WBC Occ /HPF (0-4)    


 


Urine Squamous Epithelial


Cells Many /LPF 


 


 


 





 


Urine Bacteria


 Few /HPF


(0-FEW) 


 


 











Laboratory Tests








Test


 9/15/21


13:52


 


Urine Collection Type Unknown 


 


Urine Color Yellow 


 


Urine Clarity Clear 


 


Urine pH 6.5 (<5.0-8.0) 


 


Urine Specific Gravity


 1.015


(1.000-1.030)


 


Urine Protein


 Negative mg/dL


(NEG-TRACE)


 


Urine Glucose (UA)


 Negative mg/dL


(NEG)


 


Urine Ketones (Stick)


 Negative mg/dL


(NEG)


 


Urine Blood Negative (NEG) 


 


Urine Nitrite Negative (NEG) 


 


Urine Bilirubin Negative (NEG) 


 


Urine Urobilinogen Dipstick


 0.2 mg/dL (0.2


mg/dL)


 


Urine Leukocyte Esterase Negative (NEG) 


 


Urine RBC Occ /HPF (0-2) 


 


Urine WBC Occ /HPF (0-4) 


 


Urine Squamous Epithelial


Cells Many /LPF 





 


Urine Bacteria


 Few /HPF


(0-FEW)








Medications





Active Scripts








 Medications  Dose


 Route/Sig


 Max Daily Dose Days Date Category Dose


Instructions


 


 Klor-Con M20


  (Potassium


 Chloride) 20 Meq


 Tab.er.prt  20 Meq


 PO DAILY


  14 4/19/21 Rx 


 


 Calcium Carbonate


 200 Mg Tab.chew  500 Mg


 PO PRN Q3HRS PRN


  14 4/19/21 Rx 


 


 Pantoprazole


 Sodium  **


  (Pantoprazole


 Sodium) 40 Mg


 Tablet.dr  40 Mg


 PO DAILYAC


  30 1/26/21 Rx 


 


 Mag-Al Plus Xs


 Suspension (Mag


 Hydrox/Al


 Hydrox/Simeth) 30


 Ml Oral.susp  30 Ml


 PO PRN Q3HRS PRN


  14 1/26/21 Rx 


 


 Tylenol


  (Acetaminophen)


 325 Mg Tablet  650 Mg


 PO PRN Q6HRS PRN


  14 1/26/21 Rx 


 


 Lidocaine PATCH


 ** (Lidocaine) 1


 Each Adh..patch  1 Each


 TP DAILY


   9/2/19 Rx  REMOVE AFTER 12 HOURS


 


 Cyclobenzaprine


 Hcl 10 Mg Tablet  1 Tab


 PO TID PRN


   6/28/18 Rx 


 


 Levothyroxine


 Sodium 150 Mcg


 Tablet  150 Mcg


 PO DAILY06


  30 9/18/17 Rx 


 


 Losartan


 Potassium 100 Mg


 Tablet  100 Mg


 PO DAILY


  30 9/17/17 Rx 


 


 Amlodipine


 Besylate 10 Mg


 Tablet  10 Mg


 PO DAILY


  30 9/17/17 Rx 


 


 Oxycodone Hcl


 Immed.release **


  (Oxycodone Hcl) 5


 Mg Tablet  30 Mg


 PO PRN Q4HRS PRN


   2/16/14 Reported 











Impression


.


1.  Acute hypoxic respiratory failure secondary to acute exacerbation of chronic


obstructive pulmonary disease and interstitial pneumonitis.


2.  COVID-19 negative.  The patient is not vaccinated with COVID.


3.  45 years of tobaccoism, likely chronic obstructive pulmonary disease with 


ongoing tobaccoism.


4.  Underlying obesity.


5.  Normal D-dimer.





Plan


.





1.  Continue present oxygen.  Keep saturation 92% and above.


2.  Smoking cessation counseling provided.


3.  Continue empiric antibiotic with doxycycline and Rocephin.


4.  Lovenox for DVT prophylaxis.


5.  Continue nebulizer treatment.


6.  PFTs as an outpatient.


7.  No need for steroids at present.


8.  We will follow along with you, likely hospitalization 24-48 hours.











ELMER LEE MD                 Sep 16, 2021 09:28

## 2021-09-16 NOTE — PDOC
[facility] PROGRESS NOTE


Date of Service


DOS:


DATE: 9/16/21 


TIME: 08:30





Chief Complaint


Chief Complaint


A/P:


Community acquired pneumonia - likely gram negative given smoking history and 

structural lung disease.


Chronic pancreatitis with essentially unchanged 5.3 cm known pancreatic head 

pseudocyst.


COPD with acute exacerbation - still an active smoker, counseled on cessation


Tobacco abuse disorder 


Mild hepatomegaly with diffuse steatosis.


Abnormal CXR and CT - Patchy groundglass opacities in bibasilar lungs, C/W  

pneumonia.


Person under investigation for COVID-19


OBESITY 


Macrocytosis





History of Present Illness


History of Present Illness


Ms Chand is a 61 year old female smoker  presented to ER today  for respiratory

infection   also had diarrhea for the last 2 weeks. c/o left upper chest pain 

and rib pain that does hurt with movement / hurts all the time.   No r evidence 

for left-sided rib fracture. she is now more short of air than she usual  denies

fever, abdominal pain, nausea, vomiting, back pain, urinary symptoms, dizziness,

headache, focal weakness, numbness or tingling. PMH  history of hypertension, 

COPD, UTI, hypokalemia, cellulitis, chronic pancreatitis CT concerning for 

pneumonia  / Unchanged pancreatic head 5.3 x 4.2 cm known pseudocyst admits to 

have smoked x 50 yrs


Denies hx COVID Vaccines, is willing to look into this.





9/15: Afebrile overnight. Still very short of breath, wheezing, requiring 2L 

NCO2 to maintain saturations 92%. Feeling pain in her back and left ribs, asking

for IV pain medication instead of her home oxycodone, counseled on the 

importance of taking PO and IV for breakthrough.





Afebrile overnight.  She is requesting for left still with cough and wheezing.  

She has become upset after discussion about IV pain medication threatened to 

leave AMA.  Have advised her she needs additional treatment for pneumonia and 

respiratory treatments.  Regardless she still left AGAINST MEDICAL ADVICE





Vitals/I&O


Vitals/I&O:





                                   Vital Signs








  Date Time  Temp Pulse Resp B/P (MAP) Pulse Ox O2 Delivery O2 Flow Rate FiO2


 


9/16/21 08:04     99 Room Air  


 


9/16/21 07:00 97.8 68 18 154/85 (108)   2.0 





 97.8       














                                    I & O   


 


 9/15/21 9/15/21 9/16/21





 15:00 23:00 07:00


 


Intake Total 960 ml  


 


Balance 960 ml  











Physical Exam


General:  Alert, Oriented X3, Cooperative


Lungs:  Clear


Abdomen:  Normal bowel sounds, Soft


Extremities:  No cyanosis


Skin:  No breakdown, No significant lesion





Labs


Labs:





Laboratory Tests








Test


 9/15/21


13:52


 


Urine Collection Type Unknown 


 


Urine Color Yellow 


 


Urine Clarity Clear 


 


Urine pH 6.5 (<5.0-8.0) 


 


Urine Specific Gravity


 1.015


(1.000-1.030)


 


Urine Protein


 Negative mg/dL


(NEG-TRACE)


 


Urine Glucose (UA)


 Negative mg/dL


(NEG)


 


Urine Ketones (Stick)


 Negative mg/dL


(NEG)


 


Urine Blood Negative (NEG) 


 


Urine Nitrite Negative (NEG) 


 


Urine Bilirubin Negative (NEG) 


 


Urine Urobilinogen Dipstick


 0.2 mg/dL (0.2


mg/dL)


 


Urine Leukocyte Esterase Negative (NEG) 


 


Urine RBC Occ /HPF (0-2) 


 


Urine WBC Occ /HPF (0-4) 


 


Urine Squamous Epithelial


Cells Many /LPF 





 


Urine Bacteria


 Few /HPF


(0-FEW)











Assessment and Plan


Assessmemt and Plan


Problems


Medical Problems:


(1) COPD with acute exacerbation


Status: Acute  





(2) Person under investigation for COVID-19


Status: Acute  





(3) Pneumonia


Status: Acute  





(4) Pyelonephritis


Status: Acute  











Comment


Review of Relevant


I have reviewed the following items raffi (where applicable) has been applied.


Medications:





Current Medications








 Medications


  (Trade)  Dose


 Ordered  Sig/Kimberli


 Route


 PRN Reason  Start Time


 Stop Time Status Last Admin


Dose Admin


 


 Doxycycline


 Hyclate 100 mg/


 Dextrose  100 ml @ 


 50 mls/hr  BID


 IV


   9/15/21 09:00


    9/15/21 21:09





 


 Amlodipine


 Besylate


  (Norvasc)  10 mg  DAILY


 PO


   9/15/21 09:00


    9/15/21 07:48





 


 Lidocaine


  (Lidoderm)  1 patch  DAILY


 TP


   9/15/21 09:00


    9/15/21 07:46





 


 Potassium Chloride


  (Klor-Con)  20 meq  DAILY


 PO


   9/15/21 09:00


    9/15/21 07:47





 


 Losartan Potassium


  (Cozaar)  100 mg  DAILY


 PO


   9/15/21 09:00


    9/15/21 07:48





 


 Cyanocobalamin


  (Vitamin B-12


 Inj)  1,000 mcg  1X  ONCE


 IM


   9/15/21 11:00


 9/15/21 11:01 DC 9/15/21 10:17





 


 Nicotine


  (Nicoderm Cq


 21mg)  1 patch  DAILY


 TD


   9/15/21 11:00


    9/15/21 10:16





 


 Budesonide


  (Pulmicort)  0.5 mg  RTBID


 NEB


   9/15/21 12:00


    9/16/21 08:04





 


 Lactobacillus


 Rhamnosus


  (Culturelle)  1 cap  BID


 PO


   9/15/21 13:00


    9/15/21 21:06





 


 Morphine Sulfate


  (Morphine


 Sulfate)  2 mg  PRN Q4HRS  PRN


 IVP


 PAIN  9/15/21 16:45


    9/15/21 21:08





 


 Zolpidem Tartrate


  (Ambien)  5 mg  PRN QHS  PRN


 PO


 INSOMNIA  9/15/21 22:30


    9/15/21 22:33














Justifications for Admission


Other Justification


Acute pancreatitis











VIKTORIA PAYNE MD        Sep 16, 2021 08:31

## 2021-09-16 NOTE — NUR
Pt left AMA. Pt removed her own IV. No bleeding noted at site. Dr. Prince on unit and 
notified of patient decision. Pt ambulated with SACHI Anglin and security to emergency room 
entrance. Pt drove herself to hospital. Nursing supervisor emily.